# Patient Record
Sex: MALE | Race: WHITE | NOT HISPANIC OR LATINO | Employment: OTHER | URBAN - METROPOLITAN AREA
[De-identification: names, ages, dates, MRNs, and addresses within clinical notes are randomized per-mention and may not be internally consistent; named-entity substitution may affect disease eponyms.]

---

## 2023-05-01 LAB
URINE CULTURE: ABNORMAL
URINE CULTURE: ABNORMAL

## 2023-06-15 LAB
ANION GAP IN SER/PLAS: 10 MMOL/L (ref 10–20)
CALCIUM (MG/DL) IN SER/PLAS: 9.2 MG/DL (ref 8.6–10.3)
CARBON DIOXIDE, TOTAL (MMOL/L) IN SER/PLAS: 31 MMOL/L (ref 21–32)
CHLORIDE (MMOL/L) IN SER/PLAS: 100 MMOL/L (ref 98–107)
CREATININE (MG/DL) IN SER/PLAS: 0.91 MG/DL (ref 0.5–1.3)
ERYTHROCYTE DISTRIBUTION WIDTH (RATIO) BY AUTOMATED COUNT: 14.7 % (ref 11.5–14.5)
ERYTHROCYTE MEAN CORPUSCULAR HEMOGLOBIN CONCENTRATION (G/DL) BY AUTOMATED: 32.6 G/DL (ref 32–36)
ERYTHROCYTE MEAN CORPUSCULAR VOLUME (FL) BY AUTOMATED COUNT: 88 FL (ref 80–100)
ERYTHROCYTES (10*6/UL) IN BLOOD BY AUTOMATED COUNT: 4.86 X10E12/L (ref 4.5–5.9)
GFR MALE: 83 ML/MIN/1.73M2
GLUCOSE (MG/DL) IN SER/PLAS: 94 MG/DL (ref 74–99)
HEMATOCRIT (%) IN BLOOD BY AUTOMATED COUNT: 42.9 % (ref 41–52)
HEMOGLOBIN (G/DL) IN BLOOD: 14 G/DL (ref 13.5–17.5)
INR IN PPP BY COAGULATION ASSAY: 1 (ref 0.9–1.1)
LEUKOCYTES (10*3/UL) IN BLOOD BY AUTOMATED COUNT: 8.9 X10E9/L (ref 4.4–11.3)
PLATELETS (10*3/UL) IN BLOOD AUTOMATED COUNT: 209 X10E9/L (ref 150–450)
POTASSIUM (MMOL/L) IN SER/PLAS: 4.6 MMOL/L (ref 3.5–5.3)
PROTHROMBIN TIME (PT) IN PPP BY COAGULATION ASSAY: 12.1 SEC (ref 9.8–13.4)
SODIUM (MMOL/L) IN SER/PLAS: 136 MMOL/L (ref 136–145)
UREA NITROGEN (MG/DL) IN SER/PLAS: 17 MG/DL (ref 6–23)

## 2023-06-22 ENCOUNTER — HOSPITAL ENCOUNTER (OUTPATIENT)
Dept: DATA CONVERSION | Facility: HOSPITAL | Age: 83
End: 2023-06-23
Attending: INTERNAL MEDICINE | Admitting: INTERNAL MEDICINE

## 2023-06-22 DIAGNOSIS — I48.0 PAROXYSMAL ATRIAL FIBRILLATION (MULTI): ICD-10-CM

## 2023-06-22 DIAGNOSIS — I11.0 HYPERTENSIVE HEART DISEASE WITH HEART FAILURE (MULTI): ICD-10-CM

## 2023-06-22 DIAGNOSIS — I42.9 CARDIOMYOPATHY, UNSPECIFIED (MULTI): ICD-10-CM

## 2023-06-22 DIAGNOSIS — Z95.2 PRESENCE OF PROSTHETIC HEART VALVE: ICD-10-CM

## 2023-06-22 DIAGNOSIS — I25.2 OLD MYOCARDIAL INFARCTION: ICD-10-CM

## 2023-06-22 DIAGNOSIS — I27.20 PULMONARY HYPERTENSION, UNSPECIFIED (MULTI): ICD-10-CM

## 2023-06-22 DIAGNOSIS — I50.22 CHRONIC SYSTOLIC (CONGESTIVE) HEART FAILURE (MULTI): ICD-10-CM

## 2023-06-22 DIAGNOSIS — Z79.01 LONG TERM (CURRENT) USE OF ANTICOAGULANTS: ICD-10-CM

## 2023-06-22 DIAGNOSIS — I25.10 ATHEROSCLEROTIC HEART DISEASE OF NATIVE CORONARY ARTERY WITHOUT ANGINA PECTORIS: ICD-10-CM

## 2023-06-22 DIAGNOSIS — I45.2 BIFASCICULAR BLOCK: ICD-10-CM

## 2023-06-22 DIAGNOSIS — Z79.84 LONG TERM (CURRENT) USE OF ORAL HYPOGLYCEMIC DRUGS: ICD-10-CM

## 2023-06-22 DIAGNOSIS — I42.0 DILATED CARDIOMYOPATHY (MULTI): ICD-10-CM

## 2023-06-22 DIAGNOSIS — I43 CARDIOMYOPATHY IN DISEASES CLASSIFIED ELSEWHERE (MULTI): ICD-10-CM

## 2023-06-26 LAB
ATRIAL RATE: 76 BPM
P AXIS: 16 DEGREES
P OFFSET: 161 MS
P ONSET: 92 MS
PR INTERVAL: 186 MS
Q ONSET: 185 MS
QRS COUNT: 12 BEATS
QRS DURATION: 202 MS
QT INTERVAL: 496 MS
QTC CALCULATION(BAZETT): 558 MS
QTC FREDERICIA: 536 MS
R AXIS: -82 DEGREES
T AXIS: 40 DEGREES
T OFFSET: 433 MS
VENTRICULAR RATE: 76 BPM

## 2023-09-27 LAB
ALANINE AMINOTRANSFERASE (SGPT) (U/L) IN SER/PLAS: 18 U/L (ref 10–52)
ALBUMIN (G/DL) IN SER/PLAS: 4.2 G/DL (ref 3.4–5)
ALKALINE PHOSPHATASE (U/L) IN SER/PLAS: 56 U/L (ref 33–136)
ANION GAP IN SER/PLAS: 15 MMOL/L (ref 10–20)
ASPARTATE AMINOTRANSFERASE (SGOT) (U/L) IN SER/PLAS: 19 U/L (ref 9–39)
BILIRUBIN TOTAL (MG/DL) IN SER/PLAS: 0.4 MG/DL (ref 0–1.2)
CALCIUM (MG/DL) IN SER/PLAS: 9.4 MG/DL (ref 8.6–10.6)
CARBON DIOXIDE, TOTAL (MMOL/L) IN SER/PLAS: 32 MMOL/L (ref 21–32)
CHLORIDE (MMOL/L) IN SER/PLAS: 101 MMOL/L (ref 98–107)
COBALAMIN (VITAMIN B12) (PG/ML) IN SER/PLAS: 565 PG/ML (ref 211–911)
CREATININE (MG/DL) IN SER/PLAS: 0.96 MG/DL (ref 0.5–1.3)
ESTIMATED AVERAGE GLUCOSE FOR HBA1C: 126 MG/DL
GFR MALE: 78 ML/MIN/1.73M2
GLUCOSE (MG/DL) IN SER/PLAS: 95 MG/DL (ref 74–99)
HEMOGLOBIN A1C/HEMOGLOBIN TOTAL IN BLOOD: 6 %
POTASSIUM (MMOL/L) IN SER/PLAS: 4.8 MMOL/L (ref 3.5–5.3)
PROTEIN TOTAL: 7.4 G/DL (ref 6.4–8.2)
SODIUM (MMOL/L) IN SER/PLAS: 143 MMOL/L (ref 136–145)
UREA NITROGEN (MG/DL) IN SER/PLAS: 18 MG/DL (ref 6–23)

## 2023-09-29 PROBLEM — R26.81 GAIT INSTABILITY: Status: ACTIVE | Noted: 2023-09-29

## 2023-09-29 PROBLEM — R39.9 UTI SYMPTOMS: Status: ACTIVE | Noted: 2023-09-29

## 2023-09-29 PROBLEM — I48.91 A-FIB (MULTI): Status: ACTIVE | Noted: 2023-09-29

## 2023-09-29 PROBLEM — N40.1 BENIGN PROSTATIC HYPERPLASIA WITH LOWER URINARY TRACT SYMPTOMS: Status: ACTIVE | Noted: 2023-09-29

## 2023-09-29 PROBLEM — J34.2 DEVIATED NASAL SEPTUM: Status: ACTIVE | Noted: 2023-09-29

## 2023-09-29 PROBLEM — I25.2 H/O ACUTE MYOCARDIAL INFARCTION: Status: ACTIVE | Noted: 2023-09-29

## 2023-09-29 PROBLEM — K21.9 GERD (GASTROESOPHAGEAL REFLUX DISEASE): Status: ACTIVE | Noted: 2023-09-29

## 2023-09-29 PROBLEM — Z85.46 H/O PROSTATE CANCER: Status: ACTIVE | Noted: 2023-09-29

## 2023-09-29 PROBLEM — I25.10 CAD IN NATIVE ARTERY: Status: ACTIVE | Noted: 2023-09-29

## 2023-09-29 PROBLEM — D64.9 ANEMIA: Status: ACTIVE | Noted: 2023-09-29

## 2023-09-29 PROBLEM — Z96.1 PSEUDOPHAKIA: Status: ACTIVE | Noted: 2023-09-29

## 2023-09-29 PROBLEM — Z95.2 S/P TAVR (TRANSCATHETER AORTIC VALVE REPLACEMENT): Status: ACTIVE | Noted: 2023-09-29

## 2023-09-29 PROBLEM — R09.02 HYPOXIA: Status: ACTIVE | Noted: 2023-09-29

## 2023-09-29 PROBLEM — H52.4 BILATERAL PRESBYOPIA: Status: ACTIVE | Noted: 2023-09-29

## 2023-09-29 PROBLEM — I50.1 HEART FAILURE, LEFT, WITH LVEF <=30% (MULTI): Status: ACTIVE | Noted: 2023-09-29

## 2023-09-29 PROBLEM — H35.3132 INTERMEDIATE STAGE NONEXUDATIVE AGE-RELATED MACULAR DEGENERATION OF BOTH EYES: Status: ACTIVE | Noted: 2023-09-29

## 2023-09-29 PROBLEM — Z95.5 S/P CORONARY ARTERY STENT PLACEMENT: Status: ACTIVE | Noted: 2023-09-29

## 2023-09-29 PROBLEM — R53.1 WEAKNESS: Status: ACTIVE | Noted: 2023-09-29

## 2023-09-29 PROBLEM — J34.89 RHINORRHEA: Status: ACTIVE | Noted: 2023-09-29

## 2023-09-29 PROBLEM — I45.2 RBBB (RIGHT BUNDLE BRANCH BLOCK WITH LEFT ANTERIOR FASCICULAR BLOCK): Status: ACTIVE | Noted: 2023-09-29

## 2023-09-29 PROBLEM — R09.89 THROAT CLEARING: Status: ACTIVE | Noted: 2023-09-29

## 2023-09-29 PROBLEM — K59.00 CONSTIPATION: Status: ACTIVE | Noted: 2023-09-29

## 2023-09-29 PROBLEM — I27.20 PULMONARY HYPERTENSION (MULTI): Status: ACTIVE | Noted: 2023-09-29

## 2023-09-29 PROBLEM — R32 URINARY INCONTINENCE: Status: ACTIVE | Noted: 2023-09-29

## 2023-09-29 PROBLEM — E46 MALNUTRITION (MULTI): Status: ACTIVE | Noted: 2023-09-29

## 2023-09-29 PROBLEM — I27.23: Status: ACTIVE | Noted: 2023-09-29

## 2023-09-29 PROBLEM — F41.9 ANXIETY: Status: ACTIVE | Noted: 2023-09-29

## 2023-09-29 PROBLEM — N39.0 URINARY TRACT INFECTION: Status: ACTIVE | Noted: 2023-09-29

## 2023-09-29 PROBLEM — R26.89 BALANCE DISORDER: Status: ACTIVE | Noted: 2023-09-29

## 2023-09-29 PROBLEM — I42.9 CARDIOMYOPATHY (MULTI): Status: ACTIVE | Noted: 2023-09-29

## 2023-09-29 RX ORDER — SILDENAFIL 100 MG/1
100 TABLET, FILM COATED ORAL AS NEEDED
COMMUNITY
Start: 2020-12-09

## 2023-09-29 RX ORDER — AZELASTINE 1 MG/ML
2 SPRAY, METERED NASAL 2 TIMES DAILY
COMMUNITY
Start: 2023-06-06 | End: 2023-12-01

## 2023-09-29 RX ORDER — NITROGLYCERIN 0.4 MG/1
0.4 TABLET SUBLINGUAL EVERY 5 MIN PRN
COMMUNITY
Start: 2021-10-07

## 2023-09-29 RX ORDER — MIRABEGRON 50 MG/1
50 TABLET, FILM COATED, EXTENDED RELEASE ORAL DAILY
COMMUNITY
End: 2023-10-04 | Stop reason: SDUPTHER

## 2023-09-29 RX ORDER — ATORVASTATIN CALCIUM 40 MG/1
40 TABLET, FILM COATED ORAL DAILY
COMMUNITY
End: 2024-04-29

## 2023-09-29 RX ORDER — TRIAMCINOLONE ACETONIDE 1 MG/G
1 CREAM TOPICAL
COMMUNITY
Start: 2023-08-04

## 2023-09-29 RX ORDER — TACROLIMUS 1 MG/G
1 OINTMENT TOPICAL 2 TIMES DAILY
COMMUNITY
Start: 2023-09-27 | End: 2024-09-27

## 2023-09-29 RX ORDER — APIXABAN 5 MG/1
1 TABLET, FILM COATED ORAL EVERY 12 HOURS
COMMUNITY
Start: 2022-01-14 | End: 2024-04-29

## 2023-09-29 RX ORDER — CARVEDILOL 3.12 MG/1
3.12 TABLET ORAL
COMMUNITY
End: 2023-12-20 | Stop reason: SDUPTHER

## 2023-09-29 RX ORDER — IPRATROPIUM BROMIDE 42 UG/1
2 SPRAY, METERED NASAL 4 TIMES DAILY
COMMUNITY
Start: 2023-06-15 | End: 2023-11-06

## 2023-09-29 RX ORDER — NICOTINE POLACRILEX 2 MG
1 GUM BUCCAL DAILY
COMMUNITY
Start: 2022-02-03 | End: 2024-01-25 | Stop reason: WASHOUT

## 2023-09-29 RX ORDER — FINASTERIDE 5 MG/1
1 TABLET, FILM COATED ORAL DAILY
COMMUNITY
Start: 2021-11-18 | End: 2024-01-25 | Stop reason: WASHOUT

## 2023-09-29 RX ORDER — SPIRONOLACTONE 25 MG/1
25 TABLET ORAL DAILY
COMMUNITY
End: 2024-05-06

## 2023-09-29 RX ORDER — MULTIVITAMIN
1 TABLET ORAL DAILY
COMMUNITY
Start: 2022-02-03

## 2023-09-29 RX ORDER — CLOPIDOGREL BISULFATE 75 MG/1
75 TABLET ORAL DAILY
COMMUNITY
Start: 2021-11-18 | End: 2024-01-22

## 2023-09-29 RX ORDER — ONDANSETRON 4 MG/1
4 TABLET, FILM COATED ORAL DAILY PRN
COMMUNITY
Start: 2023-03-01 | End: 2023-10-31

## 2023-09-29 RX ORDER — DOCUSATE SODIUM 100 MG/1
100 CAPSULE, LIQUID FILLED ORAL 2 TIMES DAILY
COMMUNITY
Start: 2021-10-11

## 2023-09-29 RX ORDER — HYDROXYZINE HYDROCHLORIDE 10 MG/1
10 TABLET, FILM COATED ORAL 3 TIMES DAILY PRN
COMMUNITY
End: 2024-01-25 | Stop reason: WASHOUT

## 2023-09-29 RX ORDER — PANTOPRAZOLE SODIUM 40 MG/1
1 TABLET, DELAYED RELEASE ORAL DAILY
COMMUNITY
Start: 2021-12-20 | End: 2024-01-25 | Stop reason: WASHOUT

## 2023-09-29 RX ORDER — AMIODARONE HYDROCHLORIDE 200 MG/1
200 TABLET ORAL DAILY
COMMUNITY
End: 2023-12-01

## 2023-09-29 RX ORDER — ACETAMINOPHEN 500 MG
1 TABLET ORAL EVERY 6 HOURS PRN
COMMUNITY
Start: 2022-02-11 | End: 2024-01-25 | Stop reason: WASHOUT

## 2023-09-29 RX ORDER — TRAZODONE HYDROCHLORIDE 50 MG/1
50 TABLET ORAL DAILY PRN
COMMUNITY
End: 2024-01-25 | Stop reason: WASHOUT

## 2023-09-29 RX ORDER — SACUBITRIL AND VALSARTAN 49; 51 MG/1; MG/1
TABLET, FILM COATED ORAL
COMMUNITY
Start: 2023-06-21 | End: 2024-04-01

## 2023-09-29 RX ORDER — TAMSULOSIN HYDROCHLORIDE 0.4 MG/1
0.4 CAPSULE ORAL DAILY
COMMUNITY
End: 2024-01-25 | Stop reason: WASHOUT

## 2023-09-29 RX ORDER — FUROSEMIDE 20 MG/1
1 TABLET ORAL DAILY PRN
COMMUNITY
Start: 2021-12-20

## 2023-09-29 RX ORDER — TRAZODONE HYDROCHLORIDE 100 MG/1
100 TABLET ORAL NIGHTLY
COMMUNITY
End: 2024-02-26 | Stop reason: SDUPTHER

## 2023-09-30 NOTE — PROGRESS NOTES
Consult Type: subsequent visit/care     Service: Electrophysiology     Subjective Data:   SEBASTIAN BINGHAM is a 82 year old Male who is Hospital Day # 2 and is s/p CRT D with  23.     No chest pain / shortness pf breath / incision site pain or tenderness.    Overnight Events: Patient had an uneventful night.     Objective Data:     Objective Information:      T   P  R  BP   MAP  SpO2   Value  36.6  71  18  132/70   91  98%  Date/Time  7: 7: 7:24  7:24   7: 7:24  Range  (36.5C - 36.8C )  (68 - 80 )  (16 - 18 )  (94 - 132 )/ (54 - 74 )  (67 - 93 )  (92% - 98% )      Pain reported at  20:30: 1 = Mild      T   P  R  BP   MAP  SpO2   Value  36.6  71  18  132/70   91  98%  Date/Time  7:24  7:24  7:24  7:24   7:24  7:24  Range  (36.5C - 36.8C )  (68 - 80 )  (16 - 18 )  (94 - 132 )/ (54 - 74 )  (67 - 93 )  (92% - 98% )    Physical Exam Narrative:  ·  Physical Exam:    General: NAD  Neck: No JVP  Cardiac: regular rate and rhythm  Lungs: Normal work of breathing   Exterminates: No edema, pulses intact   Skin: Incision site dry and no swelling noted, mild discomfort to palpitations   Neuro: Awake and alert. No focal abnormalities noted      Medication:    Medications:          Continuous Medications       --------------------------------  No continuous medications are active       Scheduled Medications       --------------------------------    1. Amiodarone:  200  mg  Oral  Every 24 Hours    2. Atorvastatin:  40  mg  Oral  At Bedtime    3. Carvedilol:  3.125  mg  Oral  2 Times a Day    4. Cephalexin:  500  mg  Oral  Every 12 Hours    5. Empagliflozin:  10  mg  Oral  Daily    6. Finasteride:  5  mg  Oral  Daily    7. Sacubitril 49 mg - Valsartan 51 m  tablet(s)  Oral  2 Times a Day    8. Spironolactone:  25  mg  Oral  Daily    9. traZODone:  100  mg  Oral  At Bedtime         PRN Medications       --------------------------------    1.  Acetaminophen:  650  mg  Oral  Every 6 Hours    2. Sodium Chloride 0.9% Injectable Flush:  10  mL  IntraVenous Flush  Every 8 Hours and as Needed        Radiology Results:    Results:        Impression:    No pneumothorax following placement left subclavian pacer/ICD device.                 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     ECG with SR + c RBBB + LAFB ( QRSd 166 ms )    He underwent successful CRT D with  6/222/23    - Continue home medications  - Antibiotics x 7 days   - The device site was examined this morning and was clean, dry, and intact with Steristrips/Dermabond present. There was no bleeding, bruising, edema, or erythema present.  - Device interrogation showed appropriate sensing, lead impedances, and thresholds.   - The chest x-ray and ECG were reviewed. Telemetry was reviewed.  -The patient will follow up with device clinic.  -The patient will return home today, and all questions were answered.      Attestation:   Note Completion:  I am a:  Resident/Fellow   Attending Attestation I saw and evaluated the patient.  I personally obtained the key and critical portions of the history and physical exam or was physically present for key and  critical portions performed by the resident/fellow. I reviewed the resident/fellow?s documentation and discussed the patient with the resident/fellow.  I agree with the resident/fellow?s medical decision making as documented in the note.     I personally evaluated the  patient on 23-Jun-2023         Electronic Signatures:  Anthony Barbour (Fellow))  (Signed 23-Jun-2023 08:26)   Authored: Service, Subjective Data, Objective Data, Assessment  and Plan, Note Completion  Ruddy Pak)  (Signed 26-Jun-2023 09:45)   Authored: Note Completion   Co-Signer: Service, Subjective Data, Objective Data, Assessment and Plan, Note Completion      Last Updated: 26-Jun-2023 09:45 by Ruddy Pak)

## 2023-09-30 NOTE — H&P
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     ECG with SR + c RBBB + LAFB ( QRSd 166 ms )    Allergies:        Allergies:  ·  No Known Allergies :     Home Medication Review:   Home Medications Reviewed: yes     Impression/Procedure:   ·  Impression and Planned Procedure: Severe cardiomyopathy with QRSd > 150 ms on GDMT presenting for CRT D       ERAS (Enhanced Recovery After Surgery):  ·  ERAS Patient: no       Physical Exam by System:    Constitutional: Well developed, awake/alert/oriented  x3, no distress, alert and cooperative   Respiratory/Thorax: Patent airways, normal breath  sounds with good chest expansion, thorax symmetric   Cardiovascular: Regular, 2+ equal pulses of the extremities,   Gastrointestinal: Nondistended, soft, non-tender   Neurological: alert and oriented x3   Skin: Warm and dry, no lesions, no rashes     Consent:   COVID-19 Consent:  ·  COVID-19 Risk Consent Surgeon has reviewed key risks related to the risk of allie COVID-19 and if they contract COVID-19 what the risks are.     Attestation:   Note Completion:  I am a:  Resident/Fellow   Attending Attestation I saw and evaluated the patient.  I personally obtained the key and critical portions of the history and physical exam or was physically present for key and  critical portions performed by the resident/fellow. I reviewed the resident/fellow?s documentation and discussed the patient with the resident/fellow.  I agree with the resident/fellow?s medical decision making as documented in the note.     I personally evaluated the patient on 22-Jun-2023          Electronic Signatures:  Anthony Barbour (Fellow))  (Signed 22-Jun-2023 09:57)   Authored: History of Present Illness, Allergies, Home  Medication Review, Impression/Procedure, ERAS, Physical Exam, Consent, Note Completion  Ruddy Pak)  (Signed 26-Jun-2023 09:35)   Authored: Note Completion   Co-Signer: History of Present Illness, Allergies, Home Medication Review, Impression/Procedure, ERAS, Physical Exam, Consent, Note Completion      Last Updated: 26-Jun-2023 09:35 by Ruddy Pak)

## 2023-10-04 ENCOUNTER — OFFICE VISIT (OUTPATIENT)
Dept: UROLOGY | Facility: CLINIC | Age: 83
End: 2023-10-04
Payer: COMMERCIAL

## 2023-10-04 VITALS — HEIGHT: 73 IN | TEMPERATURE: 97.3 F | BODY MASS INDEX: 23.72 KG/M2 | WEIGHT: 179 LBS

## 2023-10-04 DIAGNOSIS — N40.1 BENIGN PROSTATIC HYPERPLASIA WITH LOWER URINARY TRACT SYMPTOMS, SYMPTOM DETAILS UNSPECIFIED: Primary | ICD-10-CM

## 2023-10-04 DIAGNOSIS — R26.81 GAIT INSTABILITY: ICD-10-CM

## 2023-10-04 DIAGNOSIS — R53.1 WEAKNESS: ICD-10-CM

## 2023-10-04 DIAGNOSIS — R26.89 BALANCE DISORDER: ICD-10-CM

## 2023-10-04 DIAGNOSIS — I50.1 HEART FAILURE, LEFT, WITH LVEF <=30% (MULTI): ICD-10-CM

## 2023-10-04 PROCEDURE — 51798 US URINE CAPACITY MEASURE: CPT | Performed by: UROLOGY

## 2023-10-04 PROCEDURE — 99214 OFFICE O/P EST MOD 30 MIN: CPT | Performed by: UROLOGY

## 2023-10-04 PROCEDURE — 1126F AMNT PAIN NOTED NONE PRSNT: CPT | Performed by: UROLOGY

## 2023-10-04 PROCEDURE — 1159F MED LIST DOCD IN RCRD: CPT | Performed by: UROLOGY

## 2023-10-04 RX ORDER — MIRABEGRON 50 MG/1
50 TABLET, FILM COATED, EXTENDED RELEASE ORAL DAILY
Qty: 90 EACH | Refills: 3 | Status: SHIPPED | OUTPATIENT
Start: 2023-10-04 | End: 2023-11-02 | Stop reason: SDUPTHER

## 2023-10-04 NOTE — PROGRESS NOTES
Subjective   Patient ID: Timothy Comer is a 82 y.o. male who presents for Benign Prostatic Hypertrophy.  HPI  The patient is a 82 year old male with a history of BPH with worsening LUTS, mostly obstructive, on Myrbetriq 50mg. He presents today for a follow up visit.  Patient reports Myrbetriq controls urinary frequency and urgency well, however it works about 80% of the time. He is interested in other treatment options.     PVR>400 but did not void    Patient Active Problem List   Diagnosis    A-fib (CMS/HCC)    Anemia    Anxiety    Balance disorder    Benign prostatic hyperplasia with lower urinary tract symptoms    Bilateral presbyopia    CAD in native artery    Cardiomyopathy (CMS/HCC)    Constipation    Deviated nasal septum    Gait instability    GERD (gastroesophageal reflux disease)    H/O acute myocardial infarction    H/O prostate cancer    Heart failure, left, with LVEF <=30% (CMS/HCC)    Hypoxia    Intermediate stage nonexudative age-related macular degeneration of both eyes    Malnutrition (CMS/HCC)    Pseudophakia    Pulmonary hypertension (CMS/HCC)    Pulmonary hypertension due to lung diseases and hypoxia (CMS/HCC)    RBBB (right bundle branch block with left anterior fascicular block)    Rhinorrhea    S/P coronary artery stent placement    S/P TAVR (transcatheter aortic valve replacement)    Throat clearing    Urinary incontinence    Urinary tract infection    UTI symptoms    Weakness       Past Surgical History:   Procedure Laterality Date    CT HEAD ANGIO W AND WO IV CONTRAST  10/20/2021    CT HEAD ANGIO W AND WO IV CONTRAST 10/20/2021 [facility] CLINICAL LEGACY       Assessment/Plan     BPH with LUTs     I discussed with him that bladder Botox injections is an option, however, he would need to have cystoscopy prior.  Risks of intravesical Botox injection were discussed with the patient in great detail. Adverse reactions reported have been UTI, dysuria, hematuria, elevated PVR and urinary retention in up  to 15% of patients. I explained that urinary retention is not permanent and usually resolves within 6 weeks.     We will obtain a prostate MRI to evaluate prostate anatomy and size.     We will refill Myrbetriq and follow up virtually to review MRI.    All questions were answered to the patient's satisfaction. Patient agrees with the plan and wishes to proceed. Follow-up will be scheduled appropriately.     Scribed for Dr. Cain by Nenita Carrillo. I , Dr Cain, have personally reviewed and agreed with the information entered by the Virtual Scribe.

## 2023-10-10 DIAGNOSIS — I42.9 CARDIOMYOPATHY, UNSPECIFIED TYPE (MULTI): Primary | ICD-10-CM

## 2023-10-10 DIAGNOSIS — Z95.810 PRESENCE OF AUTOMATIC CARDIOVERTER/DEFIBRILLATOR (AICD): ICD-10-CM

## 2023-10-19 ENCOUNTER — HOSPITAL ENCOUNTER (OUTPATIENT)
Dept: RADIOLOGY | Facility: HOSPITAL | Age: 83
Discharge: HOME | End: 2023-10-19
Payer: COMMERCIAL

## 2023-10-19 DIAGNOSIS — R26.9 UNSPECIFIED ABNORMALITIES OF GAIT AND MOBILITY: ICD-10-CM

## 2023-10-19 PROCEDURE — 70450 CT HEAD/BRAIN W/O DYE: CPT | Performed by: RADIOLOGY

## 2023-10-19 PROCEDURE — 70450 CT HEAD/BRAIN W/O DYE: CPT

## 2023-10-25 DIAGNOSIS — R26.81 GAIT INSTABILITY: Primary | ICD-10-CM

## 2023-11-02 DIAGNOSIS — R39.9 UTI SYMPTOMS: Primary | ICD-10-CM

## 2023-11-02 DIAGNOSIS — N40.1 BENIGN PROSTATIC HYPERPLASIA WITH LOWER URINARY TRACT SYMPTOMS, SYMPTOM DETAILS UNSPECIFIED: ICD-10-CM

## 2023-11-02 RX ORDER — MIRABEGRON 50 MG/1
50 TABLET, FILM COATED, EXTENDED RELEASE ORAL DAILY
Qty: 90 TABLET | Refills: 3 | Status: SHIPPED | OUTPATIENT
Start: 2023-11-02 | End: 2024-11-01

## 2023-11-06 ENCOUNTER — LAB (OUTPATIENT)
Dept: LAB | Facility: LAB | Age: 83
End: 2023-11-06
Payer: COMMERCIAL

## 2023-11-06 DIAGNOSIS — R39.9 UTI SYMPTOMS: ICD-10-CM

## 2023-11-06 PROCEDURE — 87086 URINE CULTURE/COLONY COUNT: CPT

## 2023-11-06 PROCEDURE — 87186 SC STD MICRODIL/AGAR DIL: CPT

## 2023-11-06 RX ORDER — SULFAMETHOXAZOLE AND TRIMETHOPRIM 800; 160 MG/1; MG/1
1 TABLET ORAL 2 TIMES DAILY
Qty: 14 TABLET | Refills: 0 | Status: SHIPPED | OUTPATIENT
Start: 2023-11-06 | End: 2023-11-13

## 2023-11-07 ENCOUNTER — APPOINTMENT (OUTPATIENT)
Dept: UROLOGY | Facility: CLINIC | Age: 83
End: 2023-11-07
Payer: COMMERCIAL

## 2023-11-08 ENCOUNTER — TELEMEDICINE (OUTPATIENT)
Dept: UROLOGY | Facility: CLINIC | Age: 83
End: 2023-11-08
Payer: COMMERCIAL

## 2023-11-08 DIAGNOSIS — N40.1 BENIGN PROSTATIC HYPERPLASIA WITH URINARY OBSTRUCTION: Primary | ICD-10-CM

## 2023-11-08 DIAGNOSIS — N13.8 BENIGN PROSTATIC HYPERPLASIA WITH URINARY OBSTRUCTION: Primary | ICD-10-CM

## 2023-11-08 PROCEDURE — 99213 OFFICE O/P EST LOW 20 MIN: CPT | Performed by: UROLOGY

## 2023-11-08 NOTE — PROGRESS NOTES
Subjective     This visit was completed via telemedicine. All issues as below were discussed and addressed but no physical exam was performed unless allowed by visual confirmation. If it was felt that the patient should be evaluated in clinic, then they were directed there. Patient verbally consented to visit.    Greg Comer is a 83 y.o. male with a history of BPH with worsening LUTS, mostly obstructive, on Myrbetriq 50mg. He presents today for a follow up visit to review prostate MRI.  During his last visit patient reported Myrbetriq controls urinary frequency and urgency well, however it works about 80% of the time. He is interested in other treatment options.  Patients urine culture from 11/06/23  was positive for >100,000 e.coli, he was started on antibiotics and is feeling better. Patients prostate MRI was postponed due to metal implants.     Past Medical History:   Diagnosis Date    Personal history of other diseases of the circulatory system 02/16/2022    History of aortic valve stenosis     Past Surgical History:   Procedure Laterality Date    CT HEAD ANGIO W AND WO IV CONTRAST  10/20/2021    CT HEAD ANGIO W AND WO IV CONTRAST 10/20/2021 [facility] CLINICAL LEGACY     No family history on file.  Current Outpatient Medications   Medication Sig Dispense Refill    acetaminophen (Tylenol) 500 mg tablet Take 1 tablet (500 mg) by mouth every 6 hours if needed.      amiodarone (Pacerone) 200 mg tablet Take 1 tablet (200 mg) by mouth once daily.      atorvastatin (Lipitor) 40 mg tablet Take 1 tablet (40 mg) by mouth once daily.      azelastine (Astelin) 137 mcg (0.1 %) nasal spray Administer 2 sprays into each nostril 2 times a day.      biotin 1 mg capsule Take 1 capsule (1 mg) by mouth once daily.      carvedilol (Coreg) 3.125 mg tablet Take 1 tablet (3.125 mg) by mouth 2 times a day with meals.      clopidogrel (Plavix) 75 mg tablet Take 1 tablet (75 mg) by mouth once daily.      docusate sodium (Colace) 100 mg  capsule Take 1 capsule (100 mg) by mouth twice a day.      Eliquis 5 mg tablet Take 1 tablet (5 mg) by mouth every 12 hours.      Entresto 49-51 mg tablet       finasteride (Proscar) 5 mg tablet Take 1 tablet (5 mg) by mouth once daily.      fish oil concentrate (Omega-3) 120-180 mg capsule Take 1 capsule (1 g) by mouth once daily.      furosemide (Lasix) 20 mg tablet Take 1 tablet (20 mg) by mouth once daily as needed.      hydrOXYzine HCL (Atarax) 10 mg tablet Take 1 tablet (10 mg) by mouth 3 times a day as needed.      ipratropium (Atrovent) 21 mcg (0.03 %) nasal spray SPRAY 2 SPRAYS INTO EACH NOSTRIL 3 TIMES A DAY AS NEEDED 30 mL 0    ipratropium (Atrovent) 42 mcg (0.06 %) nasal spray USE 2 SPRAYS IN EACH NOSTRIL 3-4 TIMES DAILY. 15 mL 11    Jardiance 10 mg TAKE 1 TABLET BY MOUTH EVERY DAY 90 tablet 3    multivitamin tablet Take 1 tablet by mouth once daily.      Myrbetriq 50 mg tablet extended release 24 hr 24 hr tablet Take 1 tablet (50 mg) by mouth once daily. 90 tablet 3    nitroglycerin (Nitrostat) 0.4 mg SL tablet Place 1 tablet (0.4 mg) under the tongue every 5 minutes if needed. UP TO 3 DOSES AS NEEDED FOR CHEST PAIN.      ondansetron (Zofran) 4 mg tablet TAKE 1 TABLET BY MOUTH EVERY DAY AS NEEDED 15 tablet 0    pantoprazole (ProtoNix) 40 mg EC tablet Take 1 tablet (40 mg) by mouth once daily.      sildenafil (Viagra) 100 mg tablet Take 1 tablet (100 mg) by mouth if needed.      spironolactone (Aldactone) 25 mg tablet Take 1 tablet (25 mg) by mouth once daily.      sulfamethoxazole-trimethoprim (Bactrim DS) 800-160 mg tablet Take 1 tablet by mouth 2 times a day for 7 days. 14 tablet 0    tacrolimus (Protopic) 0.1 % ointment Apply 1 Application topically 2 times a day.      tamsulosin (Flomax) 0.4 mg 24 hr capsule Take 1 capsule (0.4 mg) by mouth once daily.      traZODone (Desyrel) 100 mg tablet Take 1 tablet (100 mg) by mouth once daily at bedtime.      traZODone (Desyrel) 50 mg tablet Take 1 tablet (50  mg) by mouth once daily as needed.      triamcinolone (Kenalog) 0.1 % cream Apply 1 Application topically.       No current facility-administered medications for this visit.     No Known Allergies  Social History     Socioeconomic History    Marital status:      Spouse name: Not on file    Number of children: Not on file    Years of education: Not on file    Highest education level: Not on file   Occupational History    Not on file   Tobacco Use    Smoking status: Not on file    Smokeless tobacco: Not on file   Substance and Sexual Activity    Alcohol use: Not on file    Drug use: Not on file    Sexual activity: Not on file   Other Topics Concern    Not on file   Social History Narrative    Not on file     Social Determinants of Health     Financial Resource Strain: Not on file   Food Insecurity: Not on file   Transportation Needs: Not on file   Physical Activity: Not on file   Stress: Not on file   Social Connections: Not on file   Intimate Partner Violence: Not on file   Housing Stability: Not on file       Review of Systems  Pertinent items are noted in HPI.    Objective     Lab Review  Lab Results   Component Value Date    WBC 8.9 06/15/2023    RBC 4.86 06/15/2023    HGB 14.0 06/15/2023    HCT 42.9 06/15/2023     06/15/2023      Lab Results   Component Value Date    BUN 18 09/27/2023    CREATININE 0.96 09/27/2023        Assessment/Plan   There are no diagnoses linked to this encounter.  BPH with LUTs     Prostate MRI Was postponed due to patients metal implants, this will be scheduled as soon as possible.     We will follow up virtually to review once completed.     2. UTI      Patients urine culture from 11/06/23  was positive for >100,000 e.coli, he was started on antibiotics and is feeling better.    All questions were answered to the patient's satisfaction. Patient agrees with the plan and wishes to proceed. Follow-up will be scheduled appropriately. ;    I spent  20 minutes of dedicated E&M  time, including preparation and review of records, notes, and data, time spent with patient/family, and documentation.       Scribed for Dr. Cain by Nenita Carrillo. I , Dr Cain, have personally reviewed and agreed with the information entered by the Virtual Scribe.        Nenita Carrillo

## 2023-11-09 DIAGNOSIS — N39.0 URINARY TRACT INFECTION WITHOUT HEMATURIA, SITE UNSPECIFIED: Primary | ICD-10-CM

## 2023-11-09 LAB — BACTERIA UR CULT: ABNORMAL

## 2023-11-09 RX ORDER — AMOXICILLIN AND CLAVULANATE POTASSIUM 875; 125 MG/1; MG/1
1 TABLET, FILM COATED ORAL 2 TIMES DAILY
Qty: 14 TABLET | Refills: 0 | Status: SHIPPED | OUTPATIENT
Start: 2023-11-09 | End: 2023-11-16

## 2023-11-13 ENCOUNTER — HOSPITAL ENCOUNTER (OUTPATIENT)
Dept: CARDIOLOGY | Facility: HOSPITAL | Age: 83
Discharge: HOME | End: 2023-11-13
Payer: COMMERCIAL

## 2023-11-13 DIAGNOSIS — I48.91 ATRIAL FIBRILLATION, UNSPECIFIED TYPE (MULTI): ICD-10-CM

## 2023-11-13 DIAGNOSIS — I42.9 CARDIOMYOPATHY, UNSPECIFIED TYPE (MULTI): Primary | ICD-10-CM

## 2023-11-13 DIAGNOSIS — Z95.810 PRESENCE OF AUTOMATIC CARDIOVERTER/DEFIBRILLATOR (AICD): ICD-10-CM

## 2023-11-13 DIAGNOSIS — I42.9 CARDIOMYOPATHY, UNSPECIFIED TYPE (MULTI): ICD-10-CM

## 2023-11-13 PROCEDURE — 93284 PRGRMG EVAL IMPLANTABLE DFB: CPT

## 2023-11-13 PROCEDURE — 93290 INTERROG DEV EVAL ICPMS IP: CPT | Performed by: INTERNAL MEDICINE

## 2023-11-13 PROCEDURE — 93284 PRGRMG EVAL IMPLANTABLE DFB: CPT | Performed by: INTERNAL MEDICINE

## 2023-11-29 ENCOUNTER — TELEMEDICINE (OUTPATIENT)
Dept: UROLOGY | Facility: CLINIC | Age: 83
End: 2023-11-29
Payer: COMMERCIAL

## 2023-11-29 PROCEDURE — 99213 OFFICE O/P EST LOW 20 MIN: CPT | Performed by: UROLOGY

## 2023-11-29 NOTE — PROGRESS NOTES
Subjective     This visit was completed via telemedicine. All issues as below were discussed and addressed but no physical exam was performed unless allowed by visual confirmation. If it was felt that the patient should be evaluated in clinic, then they were directed there. Patient verbally consented to visit.    Greg Comer is a 83 y.o. male with a history of BPH with worsening LUTS, mostly obstructive, on Myrbetriq 50mg. He presents today for a follow up visit. Patient reports Myrbetriq controls urinary frequency and urgency well, however it works about 80% of the time. He is interested in other treatment options.  Patients urine culture from 11/06/23  was positive for >100,000 e.coli, he was started on antibiotics and is feeling better. Patients prostate MRI is pending, it was postponed due to metal implants.       Past Medical History:   Diagnosis Date    Personal history of other diseases of the circulatory system 02/16/2022    History of aortic valve stenosis     Past Surgical History:   Procedure Laterality Date    CT HEAD ANGIO W AND WO IV CONTRAST  10/20/2021    CT HEAD ANGIO W AND WO IV CONTRAST 10/20/2021 [facility] CLINICAL LEGACY     No family history on file.  Current Outpatient Medications   Medication Sig Dispense Refill    acetaminophen (Tylenol) 500 mg tablet Take 1 tablet (500 mg) by mouth every 6 hours if needed.      amiodarone (Pacerone) 200 mg tablet Take 1 tablet (200 mg) by mouth once daily.      atorvastatin (Lipitor) 40 mg tablet Take 1 tablet (40 mg) by mouth once daily.      azelastine (Astelin) 137 mcg (0.1 %) nasal spray Administer 2 sprays into each nostril 2 times a day.      biotin 1 mg capsule Take 1 capsule (1 mg) by mouth once daily.      carvedilol (Coreg) 3.125 mg tablet Take 1 tablet (3.125 mg) by mouth 2 times a day with meals.      clopidogrel (Plavix) 75 mg tablet Take 1 tablet (75 mg) by mouth once daily.      docusate sodium (Colace) 100 mg capsule Take 1 capsule (100 mg) by  mouth twice a day.      Eliquis 5 mg tablet Take 1 tablet (5 mg) by mouth every 12 hours.      Entresto 49-51 mg tablet       finasteride (Proscar) 5 mg tablet Take 1 tablet (5 mg) by mouth once daily.      fish oil concentrate (Omega-3) 120-180 mg capsule Take 1 capsule (1 g) by mouth once daily.      furosemide (Lasix) 20 mg tablet Take 1 tablet (20 mg) by mouth once daily as needed.      hydrOXYzine HCL (Atarax) 10 mg tablet Take 1 tablet (10 mg) by mouth 3 times a day as needed.      ipratropium (Atrovent) 21 mcg (0.03 %) nasal spray SPRAY 2 SPRAYS INTO EACH NOSTRIL 3 TIMES A DAY AS NEEDED 30 mL 0    ipratropium (Atrovent) 42 mcg (0.06 %) nasal spray USE 2 SPRAYS IN EACH NOSTRIL 3-4 TIMES DAILY. 15 mL 11    Jardiance 10 mg TAKE 1 TABLET BY MOUTH EVERY DAY 90 tablet 3    multivitamin tablet Take 1 tablet by mouth once daily.      Myrbetriq 50 mg tablet extended release 24 hr 24 hr tablet Take 1 tablet (50 mg) by mouth once daily. 90 tablet 3    nitroglycerin (Nitrostat) 0.4 mg SL tablet Place 1 tablet (0.4 mg) under the tongue every 5 minutes if needed. UP TO 3 DOSES AS NEEDED FOR CHEST PAIN.      ondansetron (Zofran) 4 mg tablet TAKE 1 TABLET BY MOUTH EVERY DAY AS NEEDED 15 tablet 0    pantoprazole (ProtoNix) 40 mg EC tablet Take 1 tablet (40 mg) by mouth once daily.      sildenafil (Viagra) 100 mg tablet Take 1 tablet (100 mg) by mouth if needed.      spironolactone (Aldactone) 25 mg tablet Take 1 tablet (25 mg) by mouth once daily.      tacrolimus (Protopic) 0.1 % ointment Apply 1 Application topically 2 times a day.      tamsulosin (Flomax) 0.4 mg 24 hr capsule Take 1 capsule (0.4 mg) by mouth once daily.      traZODone (Desyrel) 100 mg tablet Take 1 tablet (100 mg) by mouth once daily at bedtime.      traZODone (Desyrel) 50 mg tablet Take 1 tablet (50 mg) by mouth once daily as needed.      triamcinolone (Kenalog) 0.1 % cream Apply 1 Application topically.       No current facility-administered medications  for this visit.     No Known Allergies  Social History     Socioeconomic History    Marital status:      Spouse name: Not on file    Number of children: Not on file    Years of education: Not on file    Highest education level: Not on file   Occupational History    Not on file   Tobacco Use    Smoking status: Not on file    Smokeless tobacco: Not on file   Substance and Sexual Activity    Alcohol use: Not on file    Drug use: Not on file    Sexual activity: Not on file   Other Topics Concern    Not on file   Social History Narrative    Not on file     Social Determinants of Health     Financial Resource Strain: Not on file   Food Insecurity: Not on file   Transportation Needs: Not on file   Physical Activity: Not on file   Stress: Not on file   Social Connections: Not on file   Intimate Partner Violence: Not on file   Housing Stability: Not on file       Review of Systems  Pertinent items are noted in HPI.    Objective     Lab Review  Lab Results   Component Value Date    WBC 8.9 06/15/2023    RBC 4.86 06/15/2023    HGB 14.0 06/15/2023    HCT 42.9 06/15/2023     06/15/2023      Lab Results   Component Value Date    BUN 18 09/27/2023    CREATININE 0.96 09/27/2023     Assessment/Plan   There are no diagnoses linked to this encounter.  BPH with LUTs      Prostate MRI is pending, it was postponed due to patients metal implants, this will be scheduled as soon as possible.      We will follow up virtually to review once completed.      2. UTI      Patients urine culture from 11/06/23  was positive for >100,000 e.coli, he was started on antibiotics and is feeling better.     All questions were answered to the patient's satisfaction. Patient agrees with the plan and wishes to proceed. Follow-up will be scheduled appropriately. ;     I spent  20 minutes of dedicated E&M time, including preparation and review of records, notes, and data, time spent with patient/family, and documentation.         Scribed for   Ant by Nenita Carrillo. I , Dr Cain, have personally reviewed and agreed with the information entered by the Virtual Scribe.         Nenita Carrillo

## 2023-12-01 DIAGNOSIS — R11.0 NAUSEA: ICD-10-CM

## 2023-12-01 DIAGNOSIS — J34.89 OTHER SPECIFIED DISORDERS OF NOSE AND NASAL SINUSES: ICD-10-CM

## 2023-12-01 DIAGNOSIS — I48.91 UNSPECIFIED ATRIAL FIBRILLATION (MULTI): ICD-10-CM

## 2023-12-01 RX ORDER — AMIODARONE HYDROCHLORIDE 200 MG/1
200 TABLET ORAL DAILY
Qty: 90 TABLET | Refills: 1 | Status: SHIPPED | OUTPATIENT
Start: 2023-12-01

## 2023-12-01 RX ORDER — ONDANSETRON 4 MG/1
4 TABLET, FILM COATED ORAL DAILY PRN
Qty: 15 TABLET | Refills: 0 | Status: SHIPPED | OUTPATIENT
Start: 2023-12-01 | End: 2024-02-13 | Stop reason: WASHOUT

## 2023-12-01 RX ORDER — AZELASTINE 1 MG/ML
2 SPRAY, METERED NASAL 2 TIMES DAILY
Qty: 90 ML | Refills: 1 | Status: SHIPPED | OUTPATIENT
Start: 2023-12-01

## 2023-12-05 ENCOUNTER — OFFICE VISIT (OUTPATIENT)
Dept: OTOLARYNGOLOGY | Facility: CLINIC | Age: 83
End: 2023-12-05
Payer: COMMERCIAL

## 2023-12-05 VITALS — BODY MASS INDEX: 23.72 KG/M2 | HEIGHT: 73 IN | WEIGHT: 179 LBS

## 2023-12-05 DIAGNOSIS — J34.89 NASAL AND SINUS DISCHARGE: ICD-10-CM

## 2023-12-05 DIAGNOSIS — J34.2 NASAL SEPTAL DEVIATION: ICD-10-CM

## 2023-12-05 DIAGNOSIS — J34.89 NASAL LESION: Primary | ICD-10-CM

## 2023-12-05 PROCEDURE — 31231 NASAL ENDOSCOPY DX: CPT | Performed by: NURSE PRACTITIONER

## 2023-12-05 PROCEDURE — 1126F AMNT PAIN NOTED NONE PRSNT: CPT | Performed by: NURSE PRACTITIONER

## 2023-12-05 PROCEDURE — 99213 OFFICE O/P EST LOW 20 MIN: CPT | Performed by: NURSE PRACTITIONER

## 2023-12-05 PROCEDURE — 1036F TOBACCO NON-USER: CPT | Performed by: NURSE PRACTITIONER

## 2023-12-05 PROCEDURE — 1160F RVW MEDS BY RX/DR IN RCRD: CPT | Performed by: NURSE PRACTITIONER

## 2023-12-05 PROCEDURE — 1159F MED LIST DOCD IN RCRD: CPT | Performed by: NURSE PRACTITIONER

## 2023-12-05 RX ORDER — AZELASTINE 1 MG/ML
SPRAY, METERED NASAL
Qty: 72 ML | Refills: 3 | Status: SHIPPED | OUTPATIENT
Start: 2023-12-05 | End: 2024-02-13 | Stop reason: SDUPTHER

## 2023-12-05 ASSESSMENT — PATIENT HEALTH QUESTIONNAIRE - PHQ9
2. FEELING DOWN, DEPRESSED OR HOPELESS: NOT AT ALL
SUM OF ALL RESPONSES TO PHQ9 QUESTIONS 1 AND 2: 0
1. LITTLE INTEREST OR PLEASURE IN DOING THINGS: NOT AT ALL

## 2023-12-05 NOTE — PROGRESS NOTES
"Subjective   Patient ID: Greg Comer \"Omari" is a 83 y.o. male who presents for No chief complaint on file..  HPI  Patient is an 81yo M with history of chronic rhinitis with temporary improvement with ipratropium bromide. Rec. trial of azelastine prior to consideration of procedures.   12/5/23- Patient presents for follow-up visit. He notes that he has been having improvement everyday to his nasal drainage. He notes that this is consistently clear, a little worse in the morning. He has the most benefit with azelastine and uses this consistently. He has been very well.     I personally reviewed the patients CT scan images and results. I discussed the results personally with the patient. The following findings were discussed: 10/19/23: CT Head: Left nasal septal deviation. Minimal mucosal thickening in the right frontal sinus. Otherwise, paranasal sinuses are clear.     Review of Systems  Review of systems is negative for constitutional, ophthalmological, cardiac, pulmonary, renal, gastrointestinal, musculoskeletal, mental health, endocrine, or neurologic disorders (except as listed in the HPI, PMH, and Problem List).     Objective   Physical Exam  CONSTITUTIONAL: Patient appears well developed and well nourished.   GENERAL: this is a healthy appearing male who appears stated age. The patient is alert and appropriately verbally conversant without hoarseness. This patient is in no apparent distress.   FACE: The face was inspected and no cutaneous masses or lesions were visualized. There was no erythema or edema noted. Facial movement was symmetric. No skin lesions were detected.   EYES: Extra-ocular muscle function was intact. No nystagmus was observed. Pupils were equal.   NOSE: Examination of the nose revealed the nasal dorsum to be midline. Intranasal exam reveals the septum is left deviated. The inferior turbinates were hyperemic. No masses, polyps, mucopus, or other lesion on anterior rhinoscopy. See below " procedure note as applicable for further exam.  RESPIRATORY: Normal inspiration and expiration and chest wall expansion, no use of accessory muscles to breathe, no stridor.  NEUROLOGICAL: Patient is ambulatory without assist. Mentation is clear. Answering questions appropriately.     Nasal / sinus endoscopy    Date/Time: 12/5/2023 11:33 AM    Performed by: ISABEL Dunlap  Authorized by: ISABEL Dunlap    Consent:     Consent obtained:  Verbal    Consent given by:  Patient    Risks discussed:  Bleeding, infection and pain    Alternatives discussed:  No treatment, observation and delayed treatment  Procedure details:     Indications: assessment of airway and sino-nasal symptoms      Medication:  Afrin and Miles-Synephrine 2%    Instrument: flexible fiberoptic nasal endoscope      Scope location: bilateral nare    Post-procedure details:     Patient tolerance of procedure:  Tolerated well, no immediate complications  Comments:      Findings: After topical decongestion with decongestant and anesthetic spray, nasal endoscopy was performed using an endoscope. The septum was deviated to the left. The inferior turbinates were hyperemic. The middle turbinates appeared healthy, the middle meatus is free of purulence, masses, lesions or polyps. On the right middle turbinate, continued erythematous lesion along the anterior aspect of the turbinate. The superior meatus and sphenoethmoid recess are clear bilaterally. The nasal passageway is patent. The nasopharynx was clear. There were no complications and the patient tolerated the procedure well.       Assessment/Plan   ASSESSMENT:  Patient is an 83yo M with history of chronic rhinitis with temporary improvement with ipratropium bromide. Rec. trial of azelastine prior to consideration of procedures.  12/5/23- Continued stable lesion along the right middle turbinate. Doing well with azelastine.      PLAN:  1. Nasal endoscopy: Stable lesion on anterior  aspect of the right middle turbinate, hyperemia of inferior turbinate, left dev.  2. I recommended that the patient continue azelastine nasal spray. Advised he discontinue for adverse effects.  3. If he continues doing well with the above therapy, he will follow up in 6 months or sooner if needed.     All questions were answered and patient agrees with established plan of care.

## 2023-12-05 NOTE — PATIENT INSTRUCTIONS
Today you were evaluated by Daphnie Cool CNP.    Please follow-up in 6 months or sooner if needed. If you have any questions or concerns, please contact my office at [phone].     Continue azelastine.     If you want a biopsy as previously discussed with Dr. Parsons, you would call his office.

## 2023-12-15 ENCOUNTER — OFFICE VISIT (OUTPATIENT)
Dept: ORTHOPEDIC SURGERY | Facility: CLINIC | Age: 83
End: 2023-12-15
Payer: COMMERCIAL

## 2023-12-15 ENCOUNTER — ANCILLARY PROCEDURE (OUTPATIENT)
Dept: RADIOLOGY | Facility: CLINIC | Age: 83
End: 2023-12-15
Payer: COMMERCIAL

## 2023-12-15 VITALS — WEIGHT: 179 LBS | HEIGHT: 73 IN | BODY MASS INDEX: 23.72 KG/M2

## 2023-12-15 DIAGNOSIS — R20.2 NUMBNESS AND TINGLING OF LEFT LOWER EXTREMITY: ICD-10-CM

## 2023-12-15 DIAGNOSIS — M25.572 LEFT ANKLE PAIN, UNSPECIFIED CHRONICITY: Primary | ICD-10-CM

## 2023-12-15 DIAGNOSIS — R26.89 BALANCE DISORDER: ICD-10-CM

## 2023-12-15 DIAGNOSIS — R20.0 NUMBNESS AND TINGLING OF LEFT LOWER EXTREMITY: ICD-10-CM

## 2023-12-15 DIAGNOSIS — M25.572 LEFT ANKLE PAIN, UNSPECIFIED CHRONICITY: ICD-10-CM

## 2023-12-15 PROCEDURE — 99214 OFFICE O/P EST MOD 30 MIN: CPT | Performed by: ORTHOPAEDIC SURGERY

## 2023-12-15 PROCEDURE — 1126F AMNT PAIN NOTED NONE PRSNT: CPT | Performed by: ORTHOPAEDIC SURGERY

## 2023-12-15 PROCEDURE — 1159F MED LIST DOCD IN RCRD: CPT | Performed by: ORTHOPAEDIC SURGERY

## 2023-12-15 PROCEDURE — 99204 OFFICE O/P NEW MOD 45 MIN: CPT | Performed by: ORTHOPAEDIC SURGERY

## 2023-12-15 PROCEDURE — 1036F TOBACCO NON-USER: CPT | Performed by: ORTHOPAEDIC SURGERY

## 2023-12-15 PROCEDURE — 1160F RVW MEDS BY RX/DR IN RCRD: CPT | Performed by: ORTHOPAEDIC SURGERY

## 2023-12-15 PROCEDURE — 73610 X-RAY EXAM OF ANKLE: CPT | Mod: LEFT SIDE | Performed by: RADIOLOGY

## 2023-12-15 PROCEDURE — 73610 X-RAY EXAM OF ANKLE: CPT | Mod: LT,FY

## 2023-12-15 ASSESSMENT — PAIN DESCRIPTION - DESCRIPTORS: DESCRIPTORS: NUMBNESS

## 2023-12-15 ASSESSMENT — PAIN SCALES - GENERAL: PAINLEVEL_OUTOF10: 10 - WORST POSSIBLE PAIN

## 2023-12-15 NOTE — PROGRESS NOTES
History of Present Illness  This is a pleasant 83-year-old male who presents today for initial evaluation of left anterior ankle numbness that is been present over the past year.  The patient reports that symptoms started immediately postop after his TAVR operation last February and has not improved.  Notably, he has full strength throughout his left ankle and foot and no numbness over the remainder of his ankle or foot.  The patient does describe some degree of functional limitation in terms of difficulty with walking as result of this numbness.  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  He reports that he has had a CT of his head which did not show any acute abnormalities.  He has not done anything to treat this.      BMI  Current BMI is 23    Pain is described as  nonpainful     Location:  anterior ankle  Pain level:  none  Assistive device: is not using any ambulatory assistive devices  History of surgery: no       Review of Systems   GENERAL: Negative for malaise, significant weight loss, fever  MUSCULOSKELETAL: see HPI  NEURO:  Negative       On exam:  WD/WN   A+O X3  NAD  No lymphedema  Inspection of both feet and ankles show symmetric arches.   5/5 ankle dorsiflexion and plantarflexion  SILT in the SPN, DPN, saphenous, and sural tibial distributions. Numbness to light touch exclusively in a patch along the anterior ankle joint      I personally reviewed the following radiographic exams: XR left ankle without fracture or dislocation    Assessment: Anterior ankle numbness    Plan: Discussed nonoperative and operative options in detail.   Risk and benefits discussed in detail. All questions answered today.  Recovery timeline and expectations discussed in detail.  It is unclear the etiology of this numbness, which may be iatrogenic due to the association immediately post op from his heart procedure. Fortunately he does not have any motor  impediments distally. Sensory deficit is nondermatomal. At this point, we will provide a referral to PT to help the patient with proprioception and balance. He is encouraged to continue activities as tolerated for general strengthening.     Patient was reviewed and discussed with [facility] and/or orthopedic resident.  Patient was seen and evaluated in conjunction with [facility] and/or orthopedic resident. Findings and treatment plan were discussed and approved by myself, Dr. Foster.  Not clear as to the underlying cause for his numbness.  Does not appear to be dermatomal.  Has no motor weakness.  Does not appear to have any sensitivity over his fibular head.  Have offered course of therapy to work on proprioception and strengthening.  Work on his balance.  No surgical intervention.

## 2023-12-18 DIAGNOSIS — Z79.2 NEED FOR PROPHYLACTIC ANTIBIOTIC: ICD-10-CM

## 2023-12-19 RX ORDER — NITROFURANTOIN 25; 75 MG/1; MG/1
100 CAPSULE ORAL ONCE
Qty: 1 CAPSULE | Refills: 0 | Status: SHIPPED | OUTPATIENT
Start: 2023-12-19 | End: 2023-12-19

## 2023-12-20 DIAGNOSIS — I50.1 HEART FAILURE, LEFT, WITH LVEF <=30% (MULTI): Primary | ICD-10-CM

## 2023-12-20 RX ORDER — CARVEDILOL 3.12 MG/1
3.12 TABLET ORAL
Qty: 180 TABLET | Refills: 3 | Status: SHIPPED | OUTPATIENT
Start: 2023-12-20 | End: 2023-12-21 | Stop reason: SDUPTHER

## 2023-12-21 RX ORDER — CARVEDILOL 3.12 MG/1
3.12 TABLET ORAL
Qty: 180 TABLET | Refills: 0 | Status: SHIPPED | OUTPATIENT
Start: 2023-12-21 | End: 2024-04-17

## 2024-01-03 ENCOUNTER — APPOINTMENT (OUTPATIENT)
Dept: UROLOGY | Facility: CLINIC | Age: 84
End: 2024-01-03
Payer: MEDICARE

## 2024-01-05 ENCOUNTER — EVALUATION (OUTPATIENT)
Dept: PHYSICAL THERAPY | Facility: CLINIC | Age: 84
End: 2024-01-05
Payer: MEDICARE

## 2024-01-05 DIAGNOSIS — M25.572 LEFT ANKLE PAIN, UNSPECIFIED CHRONICITY: ICD-10-CM

## 2024-01-05 DIAGNOSIS — R26.89 BALANCE DISORDER: ICD-10-CM

## 2024-01-05 PROCEDURE — 97161 PT EVAL LOW COMPLEX 20 MIN: CPT | Mod: GP

## 2024-01-05 PROCEDURE — 97110 THERAPEUTIC EXERCISES: CPT | Mod: GP

## 2024-01-05 ASSESSMENT — ENCOUNTER SYMPTOMS
DEPRESSION: 0
OCCASIONAL FEELINGS OF UNSTEADINESS: 0
LOSS OF SENSATION IN FEET: 0

## 2024-01-05 NOTE — PROGRESS NOTES
"Physical Therapy    Physical Therapy Evaluation and Treatment      Patient Name: Greg Comer \"Omari"  MRN: [id number]  Today's Date: 1/5/2024  Time Calculation  Start Time: 1100  Stop Time: 1140  Time Calculation (min): 40 min  Visit: 1    Assessment:  PT Assessment  Assessment Comment: Patient presents with signs and symptoms consistent with the physician’s medical diagnosis of balance abnormalities and left ankle numbness. He will benefit from medically necessary skilled physical therapy interventions in order to decrease pain and improve function with daily activities.     Plan:  OP PT Plan  Treatment/Interventions: Cryotherapy, Dry needling, Education/ Instruction, Electrical stimulation, Gait training, Hot pack, Manual therapy, Therapeutic activities, Therapeutic exercises  PT Plan: Skilled PT  PT Frequency: 1 time per week  Duration: 8 weeks  Onset Date: 01/01/22  Certification Period Start Date: 01/05/24  Certification Period End Date: 04/04/24  Number of Treatments Authorized: Med Nec  Rehab Potential: Good  Plan of Care Agreement: Patient    Current Problem:   1. Left ankle pain, unspecified chronicity  Referral to Physical Therapy      2. Balance disorder  Referral to Physical Therapy          Subjective    General:  General  Reason for Referral: balance abnormalities and left ankle numbness  Referred By: Jitendra Foster MD  Preferred Learning Style: auditory  General Comment: Patient is an 84 y/o male who was referred to outpatient physical therapy due to a balance abnormality and left ankle numbness. He reports the left ankle has been numb since his aortic valve surgery over a year ago. Patient reports that the numbness in his left ankle is negatively effecting his balance. He reports that he has participated in physical therapy before and the nustep helped.  Precautions:  Precautions  STEADI Fall Risk Score (The score of 4 or more indicates an increased risk of falling): 1  Pain:   0/10  Prior Level of " Function:   Independent with all ADLs    Objective   Cognition:   WNL  General Assessments:  Range of Motion Comments: Right ankle ROM:  Plantarflexion 65  Dorsiflexion 13  Inversion 33  Eversion  12  Left ankle ROM:  Plantarflexion 65  Dorsiflexion 13  Inversion 33    Strength Comments: LE strength (R/L)  Hip flexion 4/5, 4/5  Hip extension 4/5, 4/5  Hip abduction 4/5, 4/5  Knee flexion 5/5, 5/5  Knee extension 5/5, 5/5  Ankle df 3+/5, 3+/5  Ankle pf 4/5, 4/5  Core strength 3/5  Functional Assessments:  Gait Comment: Patient ambualtes with decreased velocity.    Outcome Measures:  LEFS: 37/80  Timed up and go: 16 seconds   5 x STS test: 15 seconds     Treatments:  Therapeutic Exercise  Therapeutic Exercise Performed: Yes  Therapeutic Exercise Activity 1: nustep x 10'  Therapeutic Exercise Activity 2: seated DF/PF x 20  Therapeutic Exercise Activity 3: 4 -way TB ankle strengthening x 30 ea direction    Goals:  Active       PT Problem       Patient will report compliance with his home exercise program.        Start:  01/05/24    Expected End:  04/04/24            Patient will report improvement through the LEFS to show improved activity tolerance.        Start:  01/05/24    Expected End:  04/04/24            Patient will report improvement in bilateral LE strength to 5/5 in all motions to facilitate weight bearing activity.        Start:  01/05/24    Expected End:  04/04/24

## 2024-01-09 NOTE — PROGRESS NOTES
Physical Therapy    Physical Therapy Treatment    Patient Name: Greg Comer  MRN: [id number]  Today's Date: 1/9/2024         Assessment:     Plan:       Current Problem  1. Left ankle pain, unspecified chronicity        2. Balance disorder            General          Subjective    Precautions     Vital Signs     Pain       Objective   Cognition     Posture     Extremity/Trunk Assessment  {Extremity/Trunk Assessments:00528}  {Spine,UE,LE,HAND,LYMPHEDEMA:03800}  Activity Tolerance:     Outcome Measures:  {PT Outcome Measures:03657}  Treatments:  {PT Treatments:94451}    OP EDUCATION:       Goals:  Active       PT Problem       Patient will report compliance with his home exercise program.        Start:  01/05/24    Expected End:  04/04/24            Patient will report improvement through the LEFS to show improved activity tolerance.        Start:  01/05/24    Expected End:  04/04/24            Patient will report improvement in bilateral LE strength to 5/5 in all motions to facilitate weight bearing activity.        Start:  01/05/24    Expected End:  04/04/24

## 2024-01-10 ENCOUNTER — APPOINTMENT (OUTPATIENT)
Dept: PHYSICAL THERAPY | Facility: CLINIC | Age: 84
End: 2024-01-10
Payer: MEDICARE

## 2024-01-11 ENCOUNTER — APPOINTMENT (OUTPATIENT)
Dept: PRIMARY CARE | Facility: HOSPITAL | Age: 84
End: 2024-01-11
Payer: COMMERCIAL

## 2024-01-15 ENCOUNTER — LAB (OUTPATIENT)
Dept: LAB | Facility: LAB | Age: 84
End: 2024-01-15
Payer: MEDICARE

## 2024-01-15 DIAGNOSIS — N39.0 URINARY TRACT INFECTION WITHOUT HEMATURIA, SITE UNSPECIFIED: Primary | ICD-10-CM

## 2024-01-15 DIAGNOSIS — Z79.2 NEED FOR PROPHYLACTIC ANTIBIOTIC: ICD-10-CM

## 2024-01-15 DIAGNOSIS — N39.0 URINARY TRACT INFECTION WITHOUT HEMATURIA, SITE UNSPECIFIED: ICD-10-CM

## 2024-01-15 PROCEDURE — 87186 SC STD MICRODIL/AGAR DIL: CPT

## 2024-01-15 PROCEDURE — 87086 URINE CULTURE/COLONY COUNT: CPT

## 2024-01-15 RX ORDER — NITROFURANTOIN 25; 75 MG/1; MG/1
100 CAPSULE ORAL EVERY 12 HOURS
Qty: 14 CAPSULE | Refills: 0 | Status: SHIPPED | OUTPATIENT
Start: 2024-01-15 | End: 2024-01-22

## 2024-01-16 ENCOUNTER — APPOINTMENT (OUTPATIENT)
Dept: PHYSICAL THERAPY | Facility: CLINIC | Age: 84
End: 2024-01-16
Payer: MEDICARE

## 2024-01-17 ENCOUNTER — APPOINTMENT (OUTPATIENT)
Dept: UROLOGY | Facility: CLINIC | Age: 84
End: 2024-01-17
Payer: MEDICARE

## 2024-01-18 LAB — BACTERIA UR CULT: ABNORMAL

## 2024-01-18 RX ORDER — NITROFURANTOIN 25; 75 MG/1; MG/1
100 CAPSULE ORAL EVERY 12 HOURS
Qty: 10 CAPSULE | Refills: 0 | Status: SHIPPED | OUTPATIENT
Start: 2024-01-18 | End: 2024-01-23

## 2024-01-19 DIAGNOSIS — R11.0 NAUSEA: ICD-10-CM

## 2024-01-21 DIAGNOSIS — I25.2 OLD MYOCARDIAL INFARCTION: ICD-10-CM

## 2024-01-22 RX ORDER — CLOPIDOGREL BISULFATE 75 MG/1
75 TABLET ORAL DAILY
Qty: 90 TABLET | Refills: 1 | Status: SHIPPED | OUTPATIENT
Start: 2024-01-22

## 2024-01-25 ENCOUNTER — OFFICE VISIT (OUTPATIENT)
Dept: CARDIOLOGY | Facility: HOSPITAL | Age: 84
End: 2024-01-25
Payer: MEDICARE

## 2024-01-25 VITALS
HEIGHT: 73 IN | SYSTOLIC BLOOD PRESSURE: 119 MMHG | OXYGEN SATURATION: 90 % | BODY MASS INDEX: 23.72 KG/M2 | HEART RATE: 76 BPM | DIASTOLIC BLOOD PRESSURE: 74 MMHG | WEIGHT: 179 LBS

## 2024-01-25 DIAGNOSIS — I25.10 ASCVD (ARTERIOSCLEROTIC CARDIOVASCULAR DISEASE): ICD-10-CM

## 2024-01-25 DIAGNOSIS — I42.8 NONISCHEMIC CARDIOMYOPATHY (MULTI): ICD-10-CM

## 2024-01-25 DIAGNOSIS — I48.0 PAROXYSMAL ATRIAL FIBRILLATION (MULTI): ICD-10-CM

## 2024-01-25 DIAGNOSIS — I50.1 HEART FAILURE, LEFT, WITH LVEF <=30% (MULTI): Primary | ICD-10-CM

## 2024-01-25 PROCEDURE — 1159F MED LIST DOCD IN RCRD: CPT | Performed by: INTERNAL MEDICINE

## 2024-01-25 PROCEDURE — 1160F RVW MEDS BY RX/DR IN RCRD: CPT | Performed by: INTERNAL MEDICINE

## 2024-01-25 PROCEDURE — 99214 OFFICE O/P EST MOD 30 MIN: CPT | Performed by: INTERNAL MEDICINE

## 2024-01-25 PROCEDURE — 1036F TOBACCO NON-USER: CPT | Performed by: INTERNAL MEDICINE

## 2024-01-25 PROCEDURE — 1126F AMNT PAIN NOTED NONE PRSNT: CPT | Performed by: INTERNAL MEDICINE

## 2024-01-25 RX ORDER — FLUOCINOLONE ACETONIDE 0.11 MG/ML
OIL TOPICAL 3 TIMES DAILY
COMMUNITY

## 2024-01-25 RX ORDER — ALBUTEROL SULFATE 90 UG/1
2 AEROSOL, METERED RESPIRATORY (INHALATION) EVERY 4 HOURS PRN
COMMUNITY

## 2024-01-25 RX ORDER — MELATONIN 3 MG
3 CAPSULE ORAL NIGHTLY
COMMUNITY

## 2024-01-25 ASSESSMENT — PAIN SCALES - GENERAL: PAINLEVEL: 0-NO PAIN

## 2024-01-25 ASSESSMENT — PATIENT HEALTH QUESTIONNAIRE - PHQ9
1. LITTLE INTEREST OR PLEASURE IN DOING THINGS: NOT AT ALL
2. FEELING DOWN, DEPRESSED OR HOPELESS: NOT AT ALL
SUM OF ALL RESPONSES TO PHQ9 QUESTIONS 1 AND 2: 0

## 2024-01-25 ASSESSMENT — COLUMBIA-SUICIDE SEVERITY RATING SCALE - C-SSRS
1. IN THE PAST MONTH, HAVE YOU WISHED YOU WERE DEAD OR WISHED YOU COULD GO TO SLEEP AND NOT WAKE UP?: NO
2. HAVE YOU ACTUALLY HAD ANY THOUGHTS OF KILLING YOURSELF?: NO
6. HAVE YOU EVER DONE ANYTHING, STARTED TO DO ANYTHING, OR PREPARED TO DO ANYTHING TO END YOUR LIFE?: NO

## 2024-01-25 NOTE — PROGRESS NOTES
[facility]  Primary Care Physician: No primary care provider on file.  Referring Provider/Cardiologist: n/a     Date of Visit: 01/25/2024  2:40 PM EST  Location of visit: [facility]     HPI:   Mr. Comer is an 83M with a PMHx sig for CAD s/p PCI (LAD ; 10/2021), LFLG severe AS s/p BAV (10/2021) followed by TAVR (#34 EvolutR; 2/22/22), stage C systolic HF/mixed NICM-ICM/HFrEF with moderate to severe LV dysfunction s/p ICD, pAF on AAD and DOAC therapies, and BPH who returns to the Advanced Heart Failure clinic for ongoing evaluation and management of his cardiomyopathy.     Interval hx:   Currently denies chest pain, palpitations, or LE edema. He does experience exertional dyspnea, but denies orthopnea, PND.        Hospitalizations: Denies   Medication adherence: Reports adherence       PM/SHx:  CAD s/p PCI (LAD ; 10/2021), LFLG severe AS s/p BAV (10/2021) followed by TAVR (#34 EvolutR; 2/22/22), stage C systolic HF/mixed NICM-ICM/HFrEF with moderate to severe LV dysfunction s/p ICD, pAF on AAD and DOAC therapies, BPH, h/o prostate CA, s/p spinal canal neural stimulator, BPH s/p TURP    SocHx:   , lives with his wife in [place]  Denies smoking or illicits. Reports ETOH (glass of wine every night)    FamHx:   Denies CAD/cardiomyopathy        Current Outpatient Medications   Medication Sig Dispense Refill    albuterol (ProAir HFA) 90 mcg/actuation inhaler Inhale 2 puffs every 4 hours if needed for wheezing.      amiodarone (Pacerone) 200 mg tablet TAKE 1 TABLET DAILY 90 tablet 1    atorvastatin (Lipitor) 40 mg tablet Take 1 tablet (40 mg) by mouth once daily.      azelastine (Astelin) 137 mcg (0.1 %) nasal spray Use 2 sprays in each nostril twice daily as needed for nasal drainage. 72 mL 3    carvedilol (Coreg) 3.125 mg tablet Take 1 tablet (3.125 mg) by mouth 2 times a day with meals. 180 tablet 0    clopidogrel (Plavix) 75 mg tablet Take 1 tablet  (75 mg) by mouth once daily. 90 tablet 1    docusate sodium (Colace) 100 mg capsule Take 1 capsule (100 mg) by mouth twice a day.      dupilumab (Dupixent) 300 mg/2 mL injection Inject 2 mL (300 mg) under the skin 1 time.      Eliquis 5 mg tablet Take 1 tablet (5 mg) by mouth every 12 hours.      Entresto 49-51 mg tablet       fish oil concentrate (Omega-3) 120-180 mg capsule Take 1 capsule (1 g) by mouth once daily.      fluocinolone (Derma-Smoothe) 0.01 % external oil Apply topically 3 times a day.      Jardiance 10 mg TAKE 1 TABLET BY MOUTH EVERY DAY 90 tablet 3    melatonin 3 mg capsule Take 3 mg by mouth once daily at bedtime.      multivitamin tablet Take 1 tablet by mouth once daily.      Myrbetriq 50 mg tablet extended release 24 hr 24 hr tablet Take 1 tablet (50 mg) by mouth once daily. 90 tablet 3    nitroglycerin (Nitrostat) 0.4 mg SL tablet Place 1 tablet (0.4 mg) under the tongue every 5 minutes if needed. UP TO 3 DOSES AS NEEDED FOR CHEST PAIN.      ondansetron (Zofran) 4 mg tablet TAKE 1 TABLET BY MOUTH EVERY DAY AS NEEDED 15 tablet 0    sildenafil (Viagra) 100 mg tablet Take 1 tablet (100 mg) by mouth if needed.      spironolactone (Aldactone) 25 mg tablet Take 1 tablet (25 mg) by mouth once daily.      tacrolimus (Protopic) 0.1 % ointment Apply 1 Application topically 2 times a day.      traZODone (Desyrel) 100 mg tablet Take 1 tablet (100 mg) by mouth once daily at bedtime.      triamcinolone (Kenalog) 0.1 % cream Apply 1 Application topically.      vit C/E/Zn/coppr/lutein/zeaxan (PRESERVISION AREDS-2 ORAL) Take 2 capsules by mouth once daily.      acetaminophen (Tylenol) 500 mg tablet Take 1 tablet (500 mg) by mouth every 6 hours if needed.      azelastine (Astelin) 137 mcg (0.1 %) nasal spray USE 2 SPRAYS INTO EACH NOSTRIL TWICE A DAY 90 mL 1    biotin 1 mg capsule Take 1 capsule (1 mg) by mouth once daily.      finasteride (Proscar) 5 mg tablet Take 1 tablet (5 mg) by mouth once daily.       "furosemide (Lasix) 20 mg tablet Take 1 tablet (20 mg) by mouth once daily as needed.      hydrOXYzine HCL (Atarax) 10 mg tablet Take 1 tablet (10 mg) by mouth 3 times a day as needed.      ipratropium (Atrovent) 21 mcg (0.03 %) nasal spray SPRAY 2 SPRAYS INTO EACH NOSTRIL 3 TIMES A DAY AS NEEDED (Patient not taking: Reported on 1/25/2024) 30 mL 0    ipratropium (Atrovent) 42 mcg (0.06 %) nasal spray USE 2 SPRAYS IN EACH NOSTRIL 3-4 TIMES DAILY. 15 mL 11    pantoprazole (ProtoNix) 40 mg EC tablet Take 1 tablet (40 mg) by mouth once daily.      tamsulosin (Flomax) 0.4 mg 24 hr capsule Take 1 capsule (0.4 mg) by mouth once daily.      traZODone (Desyrel) 50 mg tablet Take 1 tablet (50 mg) by mouth once daily as needed.       No current facility-administered medications for this visit.       No Known Allergies      Visit Vitals  /74 (BP Location: Left arm, Patient Position: Sitting, BP Cuff Size: Adult)   Pulse 76   Ht 1.854 m (6' 1\")   Wt 81.2 kg (179 lb)   SpO2 90%   BMI 23.62 kg/m²   Smoking Status Never   BSA 2.04 m²        Physical Exam:  On exam Mr. Comer appears his stated age, is alert and oriented x3, and in no acute distress. His sclera are anicteric and his oropharynx has moist mucous membranes. His neck is supple and without thyromegaly. The JVP is ~6 cm of water above the right atrium. His cardiac exam has regular rhythm, normal S1, S2. No S3/4. There are no murmurs. His lungs are clear to auscultation bilaterally and there is no dullness to percussion. His abdomen is soft, nontender with normoactive bowel sounds. There is no HJR. The extremities are warm and without edema. The skin is dry. There is no rash present. The distal pulses are 2+ in all four extremities. His mood and affect are appropriate for todays encounter.       Cardiac Labs/Diagnostics:    Lab Results   Component Value Date    CREATININE 0.96 09/27/2023    BUN 18 09/27/2023     09/27/2023    K 4.8 09/27/2023     " 09/27/2023    CO2 32 09/27/2023        Recent Labs     02/06/23  1053   CHOL 182   LDLF 83   HDL 64.6   TRIG 171*       Recent Labs     02/06/23  1053 04/28/22  1201 12/06/21  1550 10/13/21  1912   * 442* 3,050* 1,222*     ECG (5/11/23):  Sinus rhythm (HR 72), RBBB, LAFB, LVH    Echo (2/6/23):  1. Left ventricular systolic function is moderately to severely decreased with a 30-35% estimated ejection fraction.  2. There is a transcatheter aortic valve replacement.  3. Compared with study from 3/22/2022, peak gradient of aortic valve is less (previously 15 mmHg).    Echo (3/22/22):  1. The left ventricular systolic function is moderately decreased with a 30-35% estimated ejection fraction.  2. There is global hypokinesis of the left ventricle with minor regional variations.  3. Spectral Doppler shows an impaired relaxation pattern of left ventricular diastolic filling.  4. No left ventricular thrombus visualized.  5. The left atrium is severely dilated.  6. RVSP within normal limits.  7. There is a transcatheter aortic valve replacement.    ECG (2/23/22):  Sinus rhythm (HR 75) with PACs, LVH with repol     Impression/Plan:  Mr. Comer is an 83M with a PMHx sig for CAD s/p PCI (LAD ; 10/2021), LFLG severe AS s/p BAV (10/2021) followed by TAVR (#34 EvolutR; 2/22/22), stage C systolic HF/mixed NICM-ICM/HFrEF with moderate to severe LV dysfunction s/p ICD, pAF on AAD and DOAC therapies, and BPH who returns to the Advanced Heart Failure clinic for ongoing evaluation and management of his cardiomyopathy. At the current time he has functional class II symptoms and appears euvolemic on exam.     1) Stage C chronic systolic HF/mixed NICM-ICM/HFrEF with moderate to severe LV dysfunction (LVEF 30-35%; 2/2023) s/p ICD  Most recent BNP markedly improved (154; 2/6/23) with ongoing GDMT.   -c/w carvedilol 3.125 mg bid, entresto 49/51 mg bid, jardiance 10 mg daily, spironolactone 25 mg daily, , furosemide 20 mg  daily/prn  -repeat labs (RFP/BNP)    2) LFLG severe AS s/p BAV (10/2021) followed by TAVR (#34 EvolutR; 2/22/22)   Stable on most recent echo.     3) pAF on AAD and DOAC therapies  No bleeding.   -c/w amiodarone 200 mg daily, eliquis 5 mg bid  -will need updated amio testing (LFTs/TFTs/CXR/PFTs)     4) CAD s/p PCI (LAD ; 10/2021)  Lipids (2/6/23): tChol 182, HDL 64, LDL 83, trigs 171  Denies angina.   -c/w clopidogrel, sans ASA given DOAC use  -c/w atorvastatin 40 mg daily, BB      F/U: 6 months at /[facility]       ____________________________________________________________  Calvin Calderon DO  Section of Advanced Heart Failure and Cardiac Transplantation  Division of Cardiovascular Medicine  [facility]  [facility]

## 2024-01-25 NOTE — PATIENT INSTRUCTIONS
It was a pleasure seeing you today. Please contact myself or my team with any questions.     To reach Dr. Calderon' office please call [phone] ([place]).   Fax: [phone]   To schedule an appointment call [phone]     If you have any questions or need cardiac medication refills, please call the Heart Failure office at [phone], option 6. You may also contact the  Heart Failure Nursing team via email at [email or web address] (Please include your name and date of birth).       1) Continue your current medications  2) Labs at your convenience (CMP, BNP, TSH with reflex, lipids)  3) We will do a chest xray and PFTs  4) Follow up in 6 months at /[facility]

## 2024-01-31 ENCOUNTER — APPOINTMENT (OUTPATIENT)
Dept: PHYSICAL THERAPY | Facility: CLINIC | Age: 84
End: 2024-01-31
Payer: MEDICARE

## 2024-01-31 ENCOUNTER — PROCEDURE VISIT (OUTPATIENT)
Dept: UROLOGY | Facility: CLINIC | Age: 84
End: 2024-01-31
Payer: MEDICARE

## 2024-01-31 VITALS
DIASTOLIC BLOOD PRESSURE: 76 MMHG | HEART RATE: 78 BPM | SYSTOLIC BLOOD PRESSURE: 155 MMHG | WEIGHT: 179 LBS | BODY MASS INDEX: 23.72 KG/M2 | TEMPERATURE: 97.1 F | HEIGHT: 73 IN

## 2024-01-31 DIAGNOSIS — N40.1 BENIGN PROSTATIC HYPERPLASIA WITH URINARY OBSTRUCTION: Primary | ICD-10-CM

## 2024-01-31 DIAGNOSIS — N52.9 ERECTILE DYSFUNCTION, UNSPECIFIED ERECTILE DYSFUNCTION TYPE: ICD-10-CM

## 2024-01-31 DIAGNOSIS — N13.8 BENIGN PROSTATIC HYPERPLASIA WITH URINARY OBSTRUCTION: Primary | ICD-10-CM

## 2024-01-31 LAB
POC APPEARANCE, URINE: CLEAR
POC BILIRUBIN, URINE: NEGATIVE
POC BLOOD, URINE: ABNORMAL
POC COLOR, URINE: YELLOW
POC GLUCOSE, URINE: ABNORMAL MG/DL
POC KETONES, URINE: NEGATIVE MG/DL
POC LEUKOCYTES, URINE: ABNORMAL
POC NITRITE,URINE: NEGATIVE
POC PH, URINE: 7 PH
POC PROTEIN, URINE: ABNORMAL MG/DL
POC SPECIFIC GRAVITY, URINE: 1.02
POC UROBILINOGEN, URINE: 0.2 EU/DL

## 2024-01-31 PROCEDURE — 99214 OFFICE O/P EST MOD 30 MIN: CPT | Performed by: UROLOGY

## 2024-01-31 PROCEDURE — 52000 CYSTOURETHROSCOPY: CPT | Performed by: UROLOGY

## 2024-01-31 PROCEDURE — 81003 URINALYSIS AUTO W/O SCOPE: CPT | Performed by: UROLOGY

## 2024-01-31 RX ORDER — NITROFURANTOIN 25; 75 MG/1; MG/1
100 CAPSULE ORAL EVERY 12 HOURS
Qty: 4 CAPSULE | Refills: 0 | Status: SHIPPED | OUTPATIENT
Start: 2024-01-31 | End: 2024-02-02

## 2024-01-31 ASSESSMENT — ENCOUNTER SYMPTOMS
OCCASIONAL FEELINGS OF UNSTEADINESS: 0
DEPRESSION: 0
LOSS OF SENSATION IN FEET: 0

## 2024-01-31 ASSESSMENT — PATIENT HEALTH QUESTIONNAIRE - PHQ9
1. LITTLE INTEREST OR PLEASURE IN DOING THINGS: NOT AT ALL
SUM OF ALL RESPONSES TO PHQ9 QUESTIONS 1 AND 2: 0
2. FEELING DOWN, DEPRESSED OR HOPELESS: NOT AT ALL

## 2024-01-31 ASSESSMENT — COLUMBIA-SUICIDE SEVERITY RATING SCALE - C-SSRS
6. HAVE YOU EVER DONE ANYTHING, STARTED TO DO ANYTHING, OR PREPARED TO DO ANYTHING TO END YOUR LIFE?: NO
2. HAVE YOU ACTUALLY HAD ANY THOUGHTS OF KILLING YOURSELF?: NO
1. IN THE PAST MONTH, HAVE YOU WISHED YOU WERE DEAD OR WISHED YOU COULD GO TO SLEEP AND NOT WAKE UP?: NO

## 2024-01-31 ASSESSMENT — PAIN SCALES - GENERAL: PAINLEVEL: 0-NO PAIN

## 2024-01-31 NOTE — PROGRESS NOTES
Subjective   Greg Comer is a 83 y.o. male with history of BPH with LUTS on Myrbetriq 50mg and erectile dysfunction. He presents today for cystoscopy due to urinary frequency and urgency. He reports Myrbetriq controls urinary symptoms 80% of the time. Patient had UTI on 1/15/24, urine culture was positive for e.coli. Patient has complaints of erectile dysfunction, he previously tried PDE5 inhibitors with good response. Denies any recent gross hematuria, fevers, chills, urinary retention, nausea or vomiting.      Past Medical History:   Diagnosis Date    Personal history of other diseases of the circulatory system 02/16/2022    History of aortic valve stenosis     Past Surgical History:   Procedure Laterality Date    CT HEAD ANGIO W AND WO IV CONTRAST  10/20/2021    CT HEAD ANGIO W AND WO IV CONTRAST 10/20/2021 [facility]     No family history on file.  Current Outpatient Medications   Medication Sig Dispense Refill    albuterol (ProAir HFA) 90 mcg/actuation inhaler Inhale 2 puffs every 4 hours if needed for wheezing.      amiodarone (Pacerone) 200 mg tablet TAKE 1 TABLET DAILY 90 tablet 1    atorvastatin (Lipitor) 40 mg tablet Take 1 tablet (40 mg) by mouth once daily.      azelastine (Astelin) 137 mcg (0.1 %) nasal spray USE 2 SPRAYS INTO EACH NOSTRIL TWICE A DAY 90 mL 1    azelastine (Astelin) 137 mcg (0.1 %) nasal spray Use 2 sprays in each nostril twice daily as needed for nasal drainage. 72 mL 3    carvedilol (Coreg) 3.125 mg tablet Take 1 tablet (3.125 mg) by mouth 2 times a day with meals. 180 tablet 0    clopidogrel (Plavix) 75 mg tablet Take 1 tablet (75 mg) by mouth once daily. 90 tablet 1    docusate sodium (Colace) 100 mg capsule Take 1 capsule (100 mg) by mouth twice a day.      dupilumab (Dupixent) 300 mg/2 mL injection Inject 2 mL (300 mg) under the skin 1 time.      Eliquis 5 mg tablet Take 1 tablet (5 mg) by mouth every 12 hours.      Entresto 49-51 mg tablet       fish oil concentrate  (Omega-3) 120-180 mg capsule Take 1 capsule (1 g) by mouth once daily.      fluocinolone (Derma-Smoothe) 0.01 % external oil Apply topically 3 times a day.      furosemide (Lasix) 20 mg tablet Take 1 tablet (20 mg) by mouth once daily as needed.      ipratropium (Atrovent) 42 mcg (0.06 %) nasal spray USE 2 SPRAYS IN EACH NOSTRIL 3-4 TIMES DAILY. 15 mL 11    Jardiance 10 mg TAKE 1 TABLET BY MOUTH EVERY DAY 90 tablet 3    melatonin 3 mg capsule Take 3 mg by mouth once daily at bedtime.      multivitamin tablet Take 1 tablet by mouth once daily.      Myrbetriq 50 mg tablet extended release 24 hr 24 hr tablet Take 1 tablet (50 mg) by mouth once daily. 90 tablet 3    nitroglycerin (Nitrostat) 0.4 mg SL tablet Place 1 tablet (0.4 mg) under the tongue every 5 minutes if needed. UP TO 3 DOSES AS NEEDED FOR CHEST PAIN.      ondansetron (Zofran) 4 mg tablet TAKE 1 TABLET BY MOUTH EVERY DAY AS NEEDED 15 tablet 0    sildenafil (Viagra) 100 mg tablet Take 1 tablet (100 mg) by mouth if needed.      spironolactone (Aldactone) 25 mg tablet Take 1 tablet (25 mg) by mouth once daily.      tacrolimus (Protopic) 0.1 % ointment Apply 1 Application topically 2 times a day.      traZODone (Desyrel) 100 mg tablet Take 1 tablet (100 mg) by mouth once daily at bedtime.      triamcinolone (Kenalog) 0.1 % cream Apply 1 Application topically.      vit C/E/Zn/coppr/lutein/zeaxan (PRESERVISION AREDS-2 ORAL) Take 2 capsules by mouth once daily.       No current facility-administered medications for this visit.     No Known Allergies  Social History     Socioeconomic History    Marital status:      Spouse name: Not on file    Number of children: Not on file    Years of education: Not on file    Highest education level: Not on file   Occupational History    Not on file   Tobacco Use    Smoking status: Never    Smokeless tobacco: Never   Substance and Sexual Activity    Alcohol use: Not on file    Drug use: Not on file    Sexual activity: Not  "on file   Other Topics Concern    Not on file   Social History Narrative    Not on file     Social Determinants of Health     Financial Resource Strain: Not on file   Food Insecurity: Not on file   Transportation Needs: Not on file   Physical Activity: Not on file   Stress: Not on file   Social Connections: Not on file   Intimate Partner Violence: Not on file   Housing Stability: Not on file       Review of Systems  Pertinent items are noted in HPI.    Objective   PROCEDURE NOTE:    PREOPERATIVE DIAGNOSIS:  BPH with LUTS     POSTOPERATIVE DIAGNOSIS:  Same    OPERATION:  Flexible Cystourethroscopy      ANESTHESIA:  2%  lidocaine jelly    COMPLICATIONS:  None    EBL: Minimal    DISPOSITION:  The patient was discharged home after the procedure, per routine.    INDICATIONS: :  Mr. Comer is a 83 y.o. patient with a history of BPH with LUTS who presents today for Cystoscopy.     The indications, risks and benefits of this procedure were discussed with the patient, consent was obtained prior to the procedure, and to the best of my judgement the patient seemed to understand and agree to the procedure.    PROCEDURE:  The patient  was brought into the procedure suite and informed consent was reviewed and confirmed. Vital signs were obtained prior to the procedure: /76   Pulse 78   Temp 36.2 °C (97.1 °F)   Ht 1.854 m (6' 1\")   Wt 81.2 kg (179 lb)   BMI 23.62 kg/m² .  The patient was escorted onto the stretcher, placed supine, prepped with betadine and draped in the usual standard surgical fashion.  Intraurethral 2% viscous lidocaine jelly was used for local analgesia.  A 16 Sinhala flexible cystourethroscope was inserted into the urethra.       The penile urethra was normal.    Bladder: Upon entering the bladder the entire bladder was surveyed in a 360 degree fashion.  The left and right ureteral orifices were in normal orthotopic position effluxing clear yellow urine, bilaterally.   There was no evidence of any " bladder lesions, foreign objects, stones or evidence of any mucosal changes. The cystoscope was then retroflexed.  The bladder neck was then further examined without any evidence of lesions.     The scope was then removed and in an antegrade fashion, the urethra and bladder were again resurveyed with no evidence of additional lesions.  The cystoscope was then fully removed.   The patient tolerated the procedure well.  Vitals were stable after the procedure.  The patient was able to void and was discharged home.  Verbal and written Post procedure instructions were reviewed with the patient.    IMPRESSION:    Widely patent channel with minimal residual adenoma s/p PVP, distended heavily trabeculated bladder       Lab Review  Lab Results   Component Value Date    WBC 8.9 06/15/2023    RBC 4.86 06/15/2023    HGB 14.0 06/15/2023    HCT 42.9 06/15/2023     06/15/2023      Lab Results   Component Value Date    BUN 18 09/27/2023    CREATININE 0.96 09/27/2023        Urine analysis shows +leukocytes, +blood, +glucose      Assessment/Plan   Diagnoses and all orders for this visit:  Benign prostatic hyperplasia with urinary obstruction  -     POCT UA Automated manually resulted  -     Cystourethroscopy  Erectile dysfunction, unspecified erectile dysfunction type    BPH with LUTS s/p PVP on Myrbetriq 50mg.    Cystoscopy today showed a widely patent channel with minimal residual adenoma s/p PVP, distended heavily trabeculated bladder     I discussed with the patient that urinary symptoms are likely due to chronically damaged bladder and no surgical intervention is required.     We will continue with Myrbetriq 50mg.     2. Erectile Dysfunction     Patient has complaints of erectile dysfunction, he previously tried PDE5 inhibitors with good response.     He currently has nitroglycerin listed as one of his active medications which is contraindicated in PDE5 inhibitors, patient will discuss this further with his cardiologist.      We discussed penile injections as an alternative to PDE5 inhibitors, however, patient is not interested in this.     All questions were answered to the patient's satisfaction. Patient agrees with the plan and wishes to proceed. Follow-up will be scheduled appropriately.     I spent 30 minutes of dedicated E&M time, including preparation and review of records, notes, and data, time spent with patient/family, and documentation.     Scribed for Dr. Cain by Nenita Carrillo. I , Dr Cain, have personally reviewed and agreed with the information entered by the Virtual Scribe.

## 2024-02-01 ENCOUNTER — HOSPITAL ENCOUNTER (OUTPATIENT)
Dept: RADIOLOGY | Facility: HOSPITAL | Age: 84
Discharge: HOME | End: 2024-02-01
Payer: MEDICARE

## 2024-02-01 ENCOUNTER — LAB (OUTPATIENT)
Dept: LAB | Facility: LAB | Age: 84
End: 2024-02-01
Payer: MEDICARE

## 2024-02-01 DIAGNOSIS — I50.1 HEART FAILURE, LEFT, WITH LVEF <=30% (MULTI): ICD-10-CM

## 2024-02-01 LAB
ALBUMIN SERPL BCP-MCNC: 3.7 G/DL (ref 3.4–5)
ALP SERPL-CCNC: 62 U/L (ref 33–136)
ALT SERPL W P-5'-P-CCNC: 14 U/L (ref 10–52)
ANION GAP SERPL CALC-SCNC: 11 MMOL/L (ref 10–20)
AST SERPL W P-5'-P-CCNC: 18 U/L (ref 9–39)
BILIRUB SERPL-MCNC: 0.4 MG/DL (ref 0–1.2)
BNP SERPL-MCNC: 138 PG/ML (ref 0–99)
BUN SERPL-MCNC: 13 MG/DL (ref 6–23)
CALCIUM SERPL-MCNC: 8.9 MG/DL (ref 8.6–10.3)
CHLORIDE SERPL-SCNC: 100 MMOL/L (ref 98–107)
CHOLEST SERPL-MCNC: 143 MG/DL (ref 0–199)
CHOLESTEROL/HDL RATIO: 2.2
CO2 SERPL-SCNC: 32 MMOL/L (ref 21–32)
CREAT SERPL-MCNC: 0.95 MG/DL (ref 0.5–1.3)
EGFRCR SERPLBLD CKD-EPI 2021: 79 ML/MIN/1.73M*2
GLUCOSE SERPL-MCNC: 73 MG/DL (ref 74–99)
HDLC SERPL-MCNC: 64.1 MG/DL
LDLC SERPL CALC-MCNC: 50 MG/DL
NON HDL CHOLESTEROL: 79 MG/DL (ref 0–149)
POTASSIUM SERPL-SCNC: 4.4 MMOL/L (ref 3.5–5.3)
PROT SERPL-MCNC: 7.1 G/DL (ref 6.4–8.2)
SODIUM SERPL-SCNC: 139 MMOL/L (ref 136–145)
TRIGL SERPL-MCNC: 147 MG/DL (ref 0–149)
TSH SERPL-ACNC: 2.29 MIU/L (ref 0.44–3.98)
VLDL: 29 MG/DL (ref 0–40)

## 2024-02-01 PROCEDURE — 36415 COLL VENOUS BLD VENIPUNCTURE: CPT

## 2024-02-01 PROCEDURE — 84443 ASSAY THYROID STIM HORMONE: CPT

## 2024-02-01 PROCEDURE — 71046 X-RAY EXAM CHEST 2 VIEWS: CPT

## 2024-02-01 PROCEDURE — 83880 ASSAY OF NATRIURETIC PEPTIDE: CPT

## 2024-02-01 PROCEDURE — 80061 LIPID PANEL: CPT

## 2024-02-01 PROCEDURE — 71046 X-RAY EXAM CHEST 2 VIEWS: CPT | Performed by: RADIOLOGY

## 2024-02-01 PROCEDURE — 80053 COMPREHEN METABOLIC PANEL: CPT

## 2024-02-05 ENCOUNTER — TREATMENT (OUTPATIENT)
Dept: PHYSICAL THERAPY | Facility: CLINIC | Age: 84
End: 2024-02-05
Payer: MEDICARE

## 2024-02-05 DIAGNOSIS — R26.89 BALANCE DISORDER: ICD-10-CM

## 2024-02-05 DIAGNOSIS — M25.572 LEFT ANKLE PAIN, UNSPECIFIED CHRONICITY: Primary | ICD-10-CM

## 2024-02-05 PROCEDURE — 97110 THERAPEUTIC EXERCISES: CPT | Mod: GP,CQ | Performed by: PHYSICAL THERAPY ASSISTANT

## 2024-02-05 PROCEDURE — 97140 MANUAL THERAPY 1/> REGIONS: CPT | Mod: GP,CQ | Performed by: PHYSICAL THERAPY ASSISTANT

## 2024-02-05 NOTE — PROGRESS NOTES
"Physical Therapy    Physical Therapy Treatment    Patient Name: Greg Comer  MRN: [id number]  Today's Date: 2/5/2024  1-145p  Visit 2         Assessment:  Struggles w/ seated PF , banded PF and heel raises on Shuttle falling into supination.   Plan:  Seated wt. rocker board/BAPS.   OP PT Plan  Treatment/Interventions: Cryotherapy, Dry needling, Education/ Instruction, Electrical stimulation, Gait training, Hot pack, Manual therapy, Therapeutic activities, Therapeutic exercises  PT Plan: Skilled PT  PT Frequency: 1 time per week  Duration: 8 weeks  Onset Date: 01/01/22  Certification Period Start Date: 01/05/24  Certification Period End Date: 04/04/24  Number of Treatments Authorized: Med Nec  Rehab Potential: Good  Plan of Care Agreement: Patient    Current Problem  1. Left ankle pain, unspecified chronicity        2. Balance disorder          Subjective    Compliant w/ HEP. Had a bladder procedure 5 days ago and surgeon instructions were to \"take it easy for ten days. \"        Pain   none    Objective   Weak gastrocs B        Treatments:  Therapeutic Exercise  Therapeutic Exercise Performed: Yes  Therapeutic Exercise Activity 1: nustep x 10'  Therapeutic Exercise Activity 2: seated DF/PF x 20  Therapeutic Exercise Activity 3: 4 -way TB ankle strengthening GTB x 10 ea direction  Therapeutic Exercise Activity 4: Shuttle 2B LP x 40 , heel riases 1B x 10  Therapeutic Exercise Activity 5: Leg Press heel raises 45# x 20  Therapeutic Exercise Activity 6: Great toe flexion x 20 R/L    Manual Therapy  Manual Therapy Performed: Yes  Manual Therapy Activity 4: PROM all toes and midfoot R/L    OP EDUCATION:       Goals:  Active       PT Problem       Patient will report compliance with his home exercise program.        Start:  01/05/24    Expected End:  04/04/24            Patient will report improvement through the LEFS to show improved activity tolerance.        Start:  01/05/24    Expected End:  04/04/24            Patient " will report improvement in bilateral LE strength to 5/5 in all motions to facilitate weight bearing activity.        Start:  01/05/24    Expected End:  04/04/24

## 2024-02-13 ENCOUNTER — OFFICE VISIT (OUTPATIENT)
Dept: PRIMARY CARE | Facility: CLINIC | Age: 84
End: 2024-02-13
Payer: MEDICARE

## 2024-02-13 VITALS
BODY MASS INDEX: 23.59 KG/M2 | DIASTOLIC BLOOD PRESSURE: 63 MMHG | HEART RATE: 66 BPM | TEMPERATURE: 98.1 F | WEIGHT: 178 LBS | HEIGHT: 73 IN | OXYGEN SATURATION: 96 % | SYSTOLIC BLOOD PRESSURE: 123 MMHG

## 2024-02-13 DIAGNOSIS — E11.65 CONTROLLED TYPE 2 DIABETES MELLITUS WITH HYPERGLYCEMIA, WITHOUT LONG-TERM CURRENT USE OF INSULIN (MULTI): ICD-10-CM

## 2024-02-13 DIAGNOSIS — D64.9 ANEMIA, UNSPECIFIED TYPE: Primary | ICD-10-CM

## 2024-02-13 DIAGNOSIS — R39.11 BENIGN PROSTATIC HYPERPLASIA WITH URINARY HESITANCY: ICD-10-CM

## 2024-02-13 DIAGNOSIS — N40.1 BENIGN PROSTATIC HYPERPLASIA WITH URINARY HESITANCY: ICD-10-CM

## 2024-02-13 DIAGNOSIS — I50.1 HEART FAILURE, LEFT, WITH LVEF <=30% (MULTI): ICD-10-CM

## 2024-02-13 PROBLEM — Z95.810 HISTORY OF CARDIAC DEFIBRILLATOR PLACEMENT: Status: ACTIVE | Noted: 2024-02-13

## 2024-02-13 PROBLEM — I45.10 RIGHT BUNDLE BRANCH BLOCK: Status: ACTIVE | Noted: 2024-02-13

## 2024-02-13 PROBLEM — N40.0 BPH (BENIGN PROSTATIC HYPERPLASIA): Status: ACTIVE | Noted: 2023-09-29

## 2024-02-13 PROBLEM — E78.5 HYPERLIPEMIA: Status: ACTIVE | Noted: 2024-02-13

## 2024-02-13 PROBLEM — E88.819 INSULIN RESISTANCE: Status: ACTIVE | Noted: 2024-02-13

## 2024-02-13 PROCEDURE — 99214 OFFICE O/P EST MOD 30 MIN: CPT | Performed by: INTERNAL MEDICINE

## 2024-02-13 PROCEDURE — 1160F RVW MEDS BY RX/DR IN RCRD: CPT | Performed by: INTERNAL MEDICINE

## 2024-02-13 PROCEDURE — 1036F TOBACCO NON-USER: CPT | Performed by: INTERNAL MEDICINE

## 2024-02-13 PROCEDURE — 1159F MED LIST DOCD IN RCRD: CPT | Performed by: INTERNAL MEDICINE

## 2024-02-13 PROCEDURE — 1126F AMNT PAIN NOTED NONE PRSNT: CPT | Performed by: INTERNAL MEDICINE

## 2024-02-13 ASSESSMENT — PATIENT HEALTH QUESTIONNAIRE - PHQ9
SUM OF ALL RESPONSES TO PHQ9 QUESTIONS 1 AND 2: 0
2. FEELING DOWN, DEPRESSED OR HOPELESS: NOT AT ALL
1. LITTLE INTEREST OR PLEASURE IN DOING THINGS: NOT AT ALL

## 2024-02-13 ASSESSMENT — ENCOUNTER SYMPTOMS
DEPRESSION: 0
LOSS OF SENSATION IN FEET: 0
OCCASIONAL FEELINGS OF UNSTEADINESS: 1

## 2024-02-13 NOTE — PATIENT INSTRUCTIONS
Have complete blood count now (or with next routine blood work)    Schedule RSV vaccine     Dr. Calvin Raymundo - [facility] ([address]  [address]  [address]  [place] [address] [phone]

## 2024-02-13 NOTE — PROGRESS NOTES
"Chief Complaint   Patient presents with    [facility]         History Of Present Illness:    Greg Comer \"Timothy\" is a 83 y.o. male presenting to [facility] care.  Timtohy has hx of CAD sp PC to LAD (10/21), severe aortic stenosis sp AVR followed by TAVR, HFrEF sp ICD placement, paroxysmal Afib, DM, HTN , HLD and BPH.  Last echo 2/6/23 demonstrated EF 30-35% with global hypokinesis.  Currently asymptomatic.  Hx of BPH sp TURP.  Recent cysto showed patent channel.  Urinary symptoms controlled.  Recently started dupixent for exzema and itch is much better.  Has history of normocytic anemia that corrected on labs 6/15/23.  A1C 6.0 on 9/27/23.  Has spinal cord stimulator - for back and leg pain, no longer necessary   Walking, weights daily  Good energy and good strength  UTD with eye exam   Colonoscopy has always been normal         Active Medical Problems:  Patient Active Problem List    Diagnosis Date Noted    Controlled type 2 diabetes mellitus with hyperglycemia, without long-term current use of insulin (CMS/[facility]) 02/14/2024    History of cardiac defibrillator placement 02/13/2024    Hyperlipemia 02/13/2024    Insulin resistance 02/13/2024    Right bundle branch block 02/13/2024    A-fib (CMS/[facility]) 09/29/2023    Anemia 09/29/2023    Anxiety 09/29/2023    Balance disorder 09/29/2023    BPH (benign prostatic hyperplasia) 09/29/2023    Bilateral presbyopia 09/29/2023    CAD in native artery 09/29/2023    Cardiomyopathy (CMS/[facility]) 09/29/2023    Constipation 09/29/2023    Deviated nasal septum 09/29/2023    Gait instability 09/29/2023    GERD (gastroesophageal reflux disease) 09/29/2023    H/O acute myocardial infarction 09/29/2023    H/O prostate cancer 09/29/2023    Heart failure, left, with LVEF <=30% (CMS/[facility]) 09/29/2023    Hypoxia 09/29/2023    Intermediate stage nonexudative age-related macular degeneration of both eyes 09/29/2023    Pseudophakia 09/29/2023    Pulmonary hypertension (CMS/[facility]) 09/29/2023    Pulmonary " hypertension due to lung diseases and hypoxia (CMS/[facility]) 09/29/2023    RBBB (right bundle branch block with left anterior fascicular block) 09/29/2023    Rhinorrhea 09/29/2023    S/P coronary artery stent placement 09/29/2023    S/P TAVR (transcatheter aortic valve replacement) 09/29/2023    Throat clearing 09/29/2023    Urinary incontinence 09/29/2023    Recurrent UTI 09/29/2023    UTI symptoms 09/29/2023    Weakness 09/29/2023       Past Medical History:  Past Medical History:   Diagnosis Date    Aortic stenosis     valve replacement followed by TAVR    Atrial fibrillation (CMS/[facility])     amoidarone and DOAC    BPH (benign prostatic hyperplasia)     CAD (coronary artery disease)     SAMM to LAD    Congestive heart failure (CHF) (CMS/[facility])     Stage C    Eczema     recently started dupixent    History of cardiac defibrillator placement     Hyperlipemia     Insulin resistance     Personal history of other diseases of the circulatory system 02/16/2022    History of aortic valve stenosis    Recurrent UTI     Right bundle branch block        Past Surgical History:  Past Surgical History:   Procedure Laterality Date    AORTIC VALVE REPLACEMENT      bovine AVR then TAVR    CT HEAD ANGIO W AND WO IV CONTRAST  10/20/2021    CT HEAD ANGIO W AND WO IV CONTRAST 10/20/2021 [facility]    SKIN CANCER EXCISION      SPINAL CORD STIMULATOR IMPLANT      TRANSURETHRAL RESECTION OF PROSTATE           Social History:  Social History     Tobacco Use    Smoking status: Never    Smokeless tobacco: Never   Vaping Use    Vaping Use: Never used   Substance Use Topics    Alcohol use: Not Currently     Alcohol/week: 7.0 standard drinks of alcohol     Types: 7 Standard drinks or equivalent per week     Comment: one drink per day - only one         Family History:  Family History   Problem Relation Name Age of Onset    Coronary artery disease Mother      Coronary artery disease Father      No Known Problems Sister      No Known Problems  Sister      No Known Problems Brother          Retired OB/Gyn - still teaches at [employer]    No Known Problems Daughter      No Known Problems Daughter          lives in group home - working every day        Allergies:  Patient has no known allergies.    Outpatient Medications:    Current Outpatient Medications:     albuterol (ProAir HFA) 90 mcg/actuation inhaler, Inhale 2 puffs every 4 hours if needed for wheezing., Disp: , Rfl:     amiodarone (Pacerone) 200 mg tablet, TAKE 1 TABLET DAILY, Disp: 90 tablet, Rfl: 1    atorvastatin (Lipitor) 40 mg tablet, Take 1 tablet (40 mg) by mouth once daily., Disp: , Rfl:     azelastine (Astelin) 137 mcg (0.1 %) nasal spray, USE 2 SPRAYS INTO EACH NOSTRIL TWICE A DAY, Disp: 90 mL, Rfl: 1    carvedilol (Coreg) 3.125 mg tablet, Take 1 tablet (3.125 mg) by mouth 2 times a day with meals., Disp: 180 tablet, Rfl: 0    clopidogrel (Plavix) 75 mg tablet, Take 1 tablet (75 mg) by mouth once daily., Disp: 90 tablet, Rfl: 1    docusate sodium (Colace) 100 mg capsule, Take 1 capsule (100 mg) by mouth twice a day., Disp: , Rfl:     dupilumab (Dupixent) 300 mg/2 mL injection, Inject 2 mL (300 mg) under the skin 1 time., Disp: , Rfl:     Eliquis 5 mg tablet, Take 1 tablet (5 mg) by mouth every 12 hours., Disp: , Rfl:     Entresto 49-51 mg tablet, , Disp: , Rfl:     fish oil concentrate (Omega-3) 120-180 mg capsule, Take 1 capsule (1 g) by mouth once daily., Disp: , Rfl:     fluocinolone (Derma-Smoothe) 0.01 % external oil, Apply topically 3 times a day., Disp: , Rfl:     Jardiance 10 mg, TAKE 1 TABLET BY MOUTH EVERY DAY, Disp: 90 tablet, Rfl: 3    melatonin 3 mg capsule, Take 3 mg by mouth once daily at bedtime., Disp: , Rfl:     multivitamin tablet, Take 1 tablet by mouth once daily., Disp: , Rfl:     Myrbetriq 50 mg tablet extended release 24 hr 24 hr tablet, Take 1 tablet (50 mg) by mouth once daily., Disp: 90 tablet, Rfl: 3    nitroglycerin (Nitrostat) 0.4 mg SL tablet, Place 1 tablet (0.4 mg)  "under the tongue every 5 minutes if needed. UP TO 3 DOSES AS NEEDED FOR CHEST PAIN., Disp: , Rfl:     spironolactone (Aldactone) 25 mg tablet, Take 1 tablet (25 mg) by mouth once daily., Disp: , Rfl:     tacrolimus (Protopic) 0.1 % ointment, Apply 1 Application topically 2 times a day., Disp: , Rfl:     traZODone (Desyrel) 100 mg tablet, Take 1 tablet (100 mg) by mouth once daily at bedtime., Disp: , Rfl:     triamcinolone (Kenalog) 0.1 % cream, Apply 1 Application topically., Disp: , Rfl:     vit C/E/Zn/coppr/lutein/zeaxan (PRESERVISION AREDS-2 ORAL), Take 2 capsules by mouth once daily., Disp: , Rfl:     furosemide (Lasix) 20 mg tablet, Take 1 tablet (20 mg) by mouth once daily as needed., Disp: , Rfl:     ipratropium (Atrovent) 42 mcg (0.06 %) nasal spray, USE 2 SPRAYS IN EACH NOSTRIL 3-4 TIMES DAILY., Disp: 15 mL, Rfl: 11    sildenafil (Viagra) 100 mg tablet, Take 1 tablet (100 mg) by mouth if needed., Disp: , Rfl:     Review of Systems:  Pertinent positives in review of systems outlined above.  Complete ROS otherwise negative.           Last Recorded Vitals:  Vitals:    02/13/24 1042   BP: 123/63   BP Location: Left arm   Patient Position: Sitting   BP Cuff Size: Adult long   Pulse: 66   Temp: 36.7 °C (98.1 °F)   SpO2: 96%   Weight: 80.7 kg (178 lb)   Height: 1.854 m (6' 1\")   Body mass index is 23.48 kg/m².        Physical Exam  Constitutional:       Appearance: Normal appearance.   HENT:      Mouth/Throat:      Pharynx: Oropharynx is clear.   Eyes:      Extraocular Movements: Extraocular movements intact.      Conjunctiva/sclera: Conjunctivae normal.      Pupils: Pupils are equal, round, and reactive to light.   Neck:      Vascular: No carotid bruit.   Cardiovascular:      Rate and Rhythm: Normal rate. Rhythm irregular.      Pulses: Normal pulses.      Heart sounds: Normal heart sounds.   Pulmonary:      Effort: Pulmonary effort is normal.      Breath sounds: Normal breath sounds.   Musculoskeletal:      " Right lower leg: No edema.      Left lower leg: No edema.   Lymphadenopathy:      Cervical: No cervical adenopathy.        Assessment/Plan   Problem List Items Addressed This Visit       Anemia - Primary     Hemoglobin and hematocrit normalized on blood work in June.  Will repeat CBC now.  Colonoscopy UTD         Relevant Orders    CBC and Auto Differential    BPH (benign prostatic hyperplasia)     Symptoms improved after TURP.  Recent cysto unremarkable.  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  Asymptomatic.  On GDMT per cardiology.  ICD in place          Controlled type 2 diabetes mellitus with hyperglycemia, without long-term current use of insulin (CMS/[facility])     Last A1C 6.0.  Will repeat with next labs. Exercising and eating a plant based diet.  Encouraged to continue with healthy lifestyle changes.          Relevant Orders    Hemoglobin A1C             Charles Bradford MD

## 2024-02-14 PROBLEM — I50.33 ACUTE ON CHRONIC HEART FAILURE WITH PRESERVED EJECTION FRACTION (MULTI): Status: RESOLVED | Noted: 2024-02-14 | Resolved: 2024-02-14

## 2024-02-14 PROBLEM — I50.33 ACUTE ON CHRONIC HEART FAILURE WITH PRESERVED EJECTION FRACTION (MULTI): Status: ACTIVE | Noted: 2024-02-14

## 2024-02-14 PROBLEM — E46 MALNUTRITION (MULTI): Status: RESOLVED | Noted: 2023-09-29 | Resolved: 2024-02-14

## 2024-02-14 PROBLEM — E11.65 CONTROLLED TYPE 2 DIABETES MELLITUS WITH HYPERGLYCEMIA, WITHOUT LONG-TERM CURRENT USE OF INSULIN (MULTI): Status: ACTIVE | Noted: 2024-02-14

## 2024-02-15 NOTE — ASSESSMENT & PLAN NOTE
Hemoglobin and hematocrit normalized on blood work in June.  Will repeat CBC now.  Colonoscopy UTD

## 2024-02-15 NOTE — ASSESSMENT & PLAN NOTE
Last A1C 6.0.  Will repeat with next labs. Exercising and eating a plant based diet.  Encouraged to continue with healthy lifestyle changes.

## 2024-02-19 ENCOUNTER — OFFICE VISIT (OUTPATIENT)
Dept: NEUROLOGY | Facility: CLINIC | Age: 84
End: 2024-02-19
Payer: MEDICARE

## 2024-02-19 ENCOUNTER — HOSPITAL ENCOUNTER (OUTPATIENT)
Dept: CARDIOLOGY | Facility: CLINIC | Age: 84
Discharge: HOME | End: 2024-02-19
Payer: MEDICARE

## 2024-02-19 DIAGNOSIS — Z95.810 PRESENCE OF AUTOMATIC CARDIOVERTER/DEFIBRILLATOR (AICD): ICD-10-CM

## 2024-02-19 DIAGNOSIS — R53.1 RIGHT SIDED WEAKNESS: ICD-10-CM

## 2024-02-19 DIAGNOSIS — R20.0 NUMBNESS AND TINGLING OF LEFT LEG: ICD-10-CM

## 2024-02-19 DIAGNOSIS — E11.42 DIABETIC POLYNEUROPATHY ASSOCIATED WITH TYPE 2 DIABETES MELLITUS (MULTI): ICD-10-CM

## 2024-02-19 DIAGNOSIS — R26.81 GAIT INSTABILITY: Primary | ICD-10-CM

## 2024-02-19 DIAGNOSIS — R20.2 NUMBNESS AND TINGLING OF LEFT LEG: ICD-10-CM

## 2024-02-19 DIAGNOSIS — I42.9 CARDIOMYOPATHY, UNSPECIFIED TYPE (MULTI): ICD-10-CM

## 2024-02-19 PROCEDURE — 1036F TOBACCO NON-USER: CPT | Performed by: STUDENT IN AN ORGANIZED HEALTH CARE EDUCATION/TRAINING PROGRAM

## 2024-02-19 PROCEDURE — 99205 OFFICE O/P NEW HI 60 MIN: CPT | Performed by: STUDENT IN AN ORGANIZED HEALTH CARE EDUCATION/TRAINING PROGRAM

## 2024-02-19 PROCEDURE — 99215 OFFICE O/P EST HI 40 MIN: CPT | Mod: GC | Performed by: STUDENT IN AN ORGANIZED HEALTH CARE EDUCATION/TRAINING PROGRAM

## 2024-02-19 PROCEDURE — 93296 REM INTERROG EVL PM/IDS: CPT

## 2024-02-19 PROCEDURE — 93295 DEV INTERROG REMOTE 1/2/MLT: CPT | Performed by: INTERNAL MEDICINE

## 2024-02-19 PROCEDURE — 1159F MED LIST DOCD IN RCRD: CPT | Performed by: STUDENT IN AN ORGANIZED HEALTH CARE EDUCATION/TRAINING PROGRAM

## 2024-02-19 PROCEDURE — 1126F AMNT PAIN NOTED NONE PRSNT: CPT | Performed by: STUDENT IN AN ORGANIZED HEALTH CARE EDUCATION/TRAINING PROGRAM

## 2024-02-19 PROCEDURE — 1160F RVW MEDS BY RX/DR IN RCRD: CPT | Performed by: STUDENT IN AN ORGANIZED HEALTH CARE EDUCATION/TRAINING PROGRAM

## 2024-02-19 NOTE — PATIENT INSTRUCTIONS
Please get your blood drawn    Schedule your MRI Call [phone] to schedule your imaging.     Call [phone] to schedule your EMG test.

## 2024-02-19 NOTE — PROGRESS NOTES
Neuromuscular Medicine Consult     Greg Comer, MRN: [id number], : 1940  Reason for Referral: Left ankle numbness  Referring Provider: Jorge Moffett MD *  Primary Care Physician: No primary care provider on file.     Impression/Plan:   Greg Comer presents with a complex history.  He recalls developing left ankle numbness immediately after a TAVR procedure in .  He denied any other part of the body being involved and this is slowly improved over time.  The acute onset and temporal proximity to a TAVR procedure raises concern for a cerebrovascular event.  However on further discussion it is apparent he has had a longstanding gait disorder and balance problems for years.  He also has urinary urgency being evaluated by urology.      On exam he notably has asymmetric weakness, sensory symptoms, and reflexes.  Left upper extremity is slightly weak proximally, in the right lower extremity he has marked right plantarflexion weakness and low tone at the ankle as compared to the remaining limbs which demonstrated increased tone and/or paratonia.  Right thumb rest tremor was briefly noted.  He is hyperreflexic in the right leg when compared to the left.  He has a patchy sensory deficit in the left leg which would be typical of a lumbar radiculopathy.  Notably his extraocular movements are restricted especially horizontally and with downgaze, along with choppy horizontal pursuit, however he does not complain of double vision.  He had a CT of the head which reveals diffuse atrophy and small vessel disease.  Although noted to have ventriculomegaly, this likely represents ex vacuo dilatation given the degree of white matter disease.    He likely has more than one issue.  His acute onset of symptoms after his TAVR procedure raise concern for a cerebral infarct periprocedurally, however other possibilities include femoral neuropathy from groin puncture and/or a coincidental lumbar radiculopathy.  The patchy  sensory changes in the left leg also support a left lumbar radiculopathy.  This however does not explain the weakness in the right ankle plantarflexion with hyperreflexia in the same limb, pointing to a central etiology also supporting a cerebrovascular event.  Lastly the extraocular restriction, increased tone, wide-based gait, momentary right thumb rest tremor or point to a CNS etiology possibly vascular PD and/or cerebellar pathology.  This likely does not represent NPH. In addition he has length dependant sensory symptoms in both legs and impaired proprioception bilaterally with a wide-based gait which may represent a peripheral polyneuropathy.    He will require an extensive workup which we will do stepwise starting with an MRI of the brain to assess for infarcts and better characterize degenerative changes.  An EMG of the left upper, lower extremity will be highest yield processing peripheral neuropathy and the presence of a lumbosacral radiculopathy.  Lastly will send the first tier of neuropathy labs.    Plan:  Problem List Items Addressed This Visit       Gait instability - wide based gait  Right sided weakness - right plantar flexion with right hyperreflexia   Restricted EOM range and hypometric saccades    Relevant Orders    MR brain wo IV contrast    Numbness and tingling of left leg  Bilateral loss of proprioception    Relevant Orders    EMG & nerve conduction to assess for left peripheral neuropathy vs lumbosacral radiculopathy    Methylmalonic Acid    Serum Protein Electrophoresis + Immunofixation    Brinnon/Lambda Free Light Chain, Serum    Hemoglobin A1C     Maciel Mancia MD  Neuromuscular Fellow       History of Present Illness:    Mr. Comer is a 83 y.o. left handed male who presents for evaluation of left leg numbness.    He had a TAVR (left femoral access) on Feb 2022 and immediately noted left ankle numbness.  He did not notice any other symptoms with this, and it has remained relatively  stable over time and may have improved slightly.    In addition he has back pain, but denied radicular shooting pains.  He does not have perceivable sensory symptoms in the rest of his legs.  He notes that he had a spinal cord stimulator placed however he never needed to use it because his symptoms improved on their own.    He has a lot of difficulty walking, which has been going on for many years. He has trouble walking due to shortness of breath. Prior to TAVR he was walking with a cane.  He has issues with balance.    He has some urinary incontinence around 4 years ago, now seeing a urologist but no clear reason. He has feeling but no control of the urine flow.     He denied bulbar symptoms, no visual symptoms, no trouble swallowing, no difficulty speaking.  He thinks his cognition is normal.    Past medical hsitory revieewed: CAD, MI in 2014 s/p stents, PEA arrest in 11/2020, Afib on eliquis, NICM w/ HFrEF 30-35% s/p TAVR (2/2022), HTN, HLD, T2DM with A1c <6.6, CAD, DVT, Spinal canal stenosis s.p laminectomy and neural stimulator placement (now dormant)     Relevant past medical, surgical, family, and social histories, along with ROS was reviewed and pertinent details noted above.     Past Medical History:   Diagnosis Date    Aortic stenosis     valve replacement followed by TAVR    Atrial fibrillation (CMS/HCC)     amoidarone and DOAC    BPH (benign prostatic hyperplasia)     CAD (coronary artery disease)     SAMM to LAD    Congestive heart failure (CHF) (CMS/HCC)     Stage C    Eczema     recently started dupixent    History of cardiac defibrillator placement     Hyperlipemia     Insulin resistance     Personal history of other diseases of the circulatory system 02/16/2022    History of aortic valve stenosis    Recurrent UTI     Right bundle branch block      Past Surgical History:   Procedure Laterality Date    AORTIC VALVE REPLACEMENT      bovine AVR then TAVR    CT HEAD ANGIO W AND WO IV CONTRAST  10/20/2021     CT HEAD ANGIO W AND WO IV CONTRAST [facility]20/2021 [facility]    SKIN CANCER EXCISION      SPINAL CORD STIMULATOR IMPLANT      TRANSURETHRAL RESECTION OF PROSTATE       Family History   Problem Relation Name Age of Onset    Coronary artery disease Mother      Coronary artery disease Father      No Known Problems Sister      No Known Problems Sister      No Known Problems Brother          Retired OB/Gyn - still teaches at [employer]    No Known Problems Daughter      No Known Problems Daughter          lives in group home - working every day     Social History     Tobacco Use    Smoking status: Never    Smokeless tobacco: Never   Substance Use Topics    Alcohol use: Not Currently     Alcohol/week: 7.0 standard drinks of alcohol     Types: 7 Standard drinks or equivalent per week     Comment: one drink per day - only one      No Known Allergies     Medications:    Current Outpatient Medications:     albuterol (ProAir HFA) 90 mcg/actuation inhaler, Inhale 2 puffs every 4 hours if needed for wheezing., Disp: , Rfl:     amiodarone (Pacerone) 200 mg tablet, TAKE 1 TABLET DAILY, Disp: 90 tablet, Rfl: 1    atorvastatin (Lipitor) 40 mg tablet, Take 1 tablet (40 mg) by mouth once daily., Disp: , Rfl:     azelastine (Astelin) 137 mcg (0.1 %) nasal spray, USE 2 SPRAYS INTO EACH NOSTRIL TWICE A DAY, Disp: 90 mL, Rfl: 1    carvedilol (Coreg) 3.125 mg tablet, Take 1 tablet (3.125 mg) by mouth 2 times a day with meals., Disp: 180 tablet, Rfl: 0    clopidogrel (Plavix) 75 mg tablet, Take 1 tablet (75 mg) by mouth once daily., Disp: 90 tablet, Rfl: 1    docusate sodium (Colace) 100 mg capsule, Take 1 capsule (100 mg) by mouth twice a day., Disp: , Rfl:     dupilumab (Dupixent) 300 mg/2 mL injection, Inject 2 mL (300 mg) under the skin 1 time., Disp: , Rfl:     Eliquis 5 mg tablet, Take 1 tablet (5 mg) by mouth every 12 hours., Disp: , Rfl:     Entresto 49-51 mg tablet, , Disp: , Rfl:     fish oil concentrate (Omega-3) 120-180 mg  capsule, Take 1 capsule (1 g) by mouth once daily., Disp: , Rfl:     fluocinolone (Derma-Smoothe) 0.01 % external oil, Apply topically 3 times a day., Disp: , Rfl:     furosemide (Lasix) 20 mg tablet, Take 1 tablet (20 mg) by mouth once daily as needed., Disp: , Rfl:     ipratropium (Atrovent) 42 mcg (0.06 %) nasal spray, USE 2 SPRAYS IN EACH NOSTRIL 3-4 TIMES DAILY., Disp: 15 mL, Rfl: 11    Jardiance 10 mg, TAKE 1 TABLET BY MOUTH EVERY DAY, Disp: 90 tablet, Rfl: 3    melatonin 3 mg capsule, Take 3 mg by mouth once daily at bedtime., Disp: , Rfl:     multivitamin tablet, Take 1 tablet by mouth once daily., Disp: , Rfl:     Myrbetriq 50 mg tablet extended release 24 hr 24 hr tablet, Take 1 tablet (50 mg) by mouth once daily., Disp: 90 tablet, Rfl: 3    nitroglycerin (Nitrostat) 0.4 mg SL tablet, Place 1 tablet (0.4 mg) under the tongue every 5 minutes if needed. UP TO 3 DOSES AS NEEDED FOR CHEST PAIN., Disp: , Rfl:     sildenafil (Viagra) 100 mg tablet, Take 1 tablet (100 mg) by mouth if needed., Disp: , Rfl:     spironolactone (Aldactone) 25 mg tablet, Take 1 tablet (25 mg) by mouth once daily., Disp: , Rfl:     tacrolimus (Protopic) 0.1 % ointment, Apply 1 Application topically 2 times a day., Disp: , Rfl:     traZODone (Desyrel) 100 mg tablet, Take 1 tablet (100 mg) by mouth once daily at bedtime., Disp: , Rfl:     triamcinolone (Kenalog) 0.1 % cream, Apply 1 Application topically., Disp: , Rfl:     vit C/E/Zn/coppr/lutein/zeaxan (PRESERVISION AREDS-2 ORAL), Take 2 capsules by mouth once daily., Disp: , Rfl:        Physical Exam:   There were no vitals taken for this visit.     General Appearance:  No distress, alert, interactive and cooperative.   Skin: Diffuse ecchymoses and bruises throughout the bilateral upper and lower extremities  Musculoskeletal:  No pes cavus, or hammertoes  Neurological:  Mental status: the patient provided an accurate history, language was normal.   Cranial Nerves:  CN 2   Visual fields  full to confrontation.   CN 3, 4, 6   Pupils round, 3 mm in diameter, equally reactive to light.   Lids symmetric; no ptosis.   EOMs abnormal alignment with bilateral esotropia, reduced range of motion especially with bilateral abduction and with downgaze, there is choppy pursuit.  Saccades are hypometric bilaterally horizontally.   No nystagmus.   CN 5   Facial sensation intact bilaterally.   CN 7   Normal and symmetric facial strength. Nasolabial folds symmetric.   Able to lift eyebrows and close eye lids with eye lashes buried symmetrically.   CN 8   Hearing intact to conversation.     CN 9, 10   Palate elevates symmetrically.   Phonation within normal limits, no dysarthria.   CN 11   Normal strength of shoulder shrug and neck turning.   CN 12   Tongue midline, with normal bulk and strength; no fasciculations.     Motor:   Muscle bulk: Normal throughout.  Muscle tone: Increased in both upper and lower extremities.  Paratonia noted the right ankle has low tone.  Movements: There is a brief moment of rest tremor in the right thumb.  No fasciculations, or other abnormal movement.     Manual Muscle Testing (MMT) reveals the following MRC grades:  Neck Flexion 5  Neck Extension 5  R L   Shoulder abduction  5 4  Elbow flexion   5 5-  Elbow extension  5 5  Finger flexion   5 5  Finger extension  5 5  Finger abduction  5 5  Thumb abduction   5 5  Hip flexion   5- 4  Hip extension   5 5  Hip abduction    5 5  Hip adduction    5 5  Knee flexion   5 5  Knee extension  5 5  Ankle dorsiflexion  4 4  Ankle plantarflexion  2 5-  Ankle Inversion   5- 5-  Ankle Eversion   5- 5-  Big toe extension  4 4  Toe flexion   4 4    Reflexes:     R          L  BR:               1          1  Biceps:         1          1  Knee:           3          1  Ankle:          0          0    Babinski: Toes are down going  Todd's: Not present  No clonus or other pathological reflexes    Sensory:   I sensation is normal in the upper  extremities.  Left lower extremity there is reduced sensation to pinprick and light touch in patchy distribution of the lateral left calf medial lateral left ankle and dorsum of the left foot.  Likely is bilateral length dependent reduction in sensation.  Reduced vibration at the bilateral ankles compared to the knee, he does not feel vibration at all in the left ankle, but faintly at the left knee.  Position sense is completely impaired bilaterally.    Coordination:    In both upper extremities, finger-nose-finger was intact without dysmetria or overshoot.   NERY were intact but very slow and uncoordinated  Finger tapping slightly reduced bilaterally    Gait:   Wide-based gait using a cane, right foot is in a hyperdorsiflexed position and lurches forward with each step.  He stumbles 1 point and walks slowly, there is no shuffling or magnetic quality.       Results:     The following labs, imaging, and/or data were personally reviewed and demonstrated:    CT head wo Sep 2023: Diffuse volume loss, there is ventricular prominence likely hydrocephalus ex vacuo, multiple areas of hypodensity bilaterally in the frontal and parietal white matter likely representing small vessel ischemic disease.  There is likely a left anterior limb of internal capsule lacunae.    ntains abnormal data Comprehensive Metabolic Panel  Order: [id number]            Component  Ref Range & Units 2 wk ago  (2/1/24) 4 mo ago  (9/27/23) 8 mo ago  (6/15/23) 1 yr ago  (2/6/23) 1 yr ago  (4/28/22) 1 yr ago  (3/2/22) 1 yr ago  (2/23/22)   Glucose  74 - 99 mg/dL 73 Low  95 94 87 96 90 110 High    Sodium  136 - 145 mmol/L 139 143 136 136 136 137 133 Low    Potassium  3.5 - 5.3 mmol/L 4.4 4.8 4.6 4.8 4.5 4.2 4.2   Chloride  98 - 107 mmol/L 100 101 100 95 Low  98 97 Low  95 Low    Bicarbonate  21 - 32 mmol/L 32 32 31 30 30 38 High  34 High    Anion Gap  10 - 20 mmol/L 11 15 10 16 13 6 Low  8 Low    Urea Nitrogen  6 - 23 mg/dL 13 18 17 15 17 16 15    Creatinine  0.50 - 1.30 mg/dL 0.95 0.96 0.91 0.98 0.81 0.78 0.82          Hemoglobin A1C           Component  Ref Range & Units 4 mo ago  (9/27/23) 1 yr ago  (1/6/23) 2 yr ago  (10/17/21) 2 yr ago  (10/4/21) 2 yr ago  (8/14/21) 3 yr ago  (8/27/20)   Hemoglobin A1C  % 6.0 Abnormal  6.0 Abnormal  CM 6.6 Abnormal  CM 6.9 High  R, CM 6.9 High  R, CM 6.6 High  R, CM        Vitamin B12        Component  Ref Range & Units 4 mo ago  (9/27/23) 2 yr ago  (12/6/21) 2 yr ago  (11/9/21)   Vitamin B-12  211 - 911 pg/mL [phone]

## 2024-02-21 ENCOUNTER — APPOINTMENT (OUTPATIENT)
Dept: PHYSICAL THERAPY | Facility: CLINIC | Age: 84
End: 2024-02-21
Payer: MEDICARE

## 2024-02-22 ENCOUNTER — LAB (OUTPATIENT)
Dept: LAB | Facility: LAB | Age: 84
End: 2024-02-22
Payer: MEDICARE

## 2024-02-22 ENCOUNTER — HOSPITAL ENCOUNTER (OUTPATIENT)
Dept: RESPIRATORY THERAPY | Facility: HOSPITAL | Age: 84
Discharge: HOME | End: 2024-02-22
Payer: MEDICARE

## 2024-02-22 DIAGNOSIS — I50.1 HEART FAILURE, LEFT, WITH LVEF <=30% (MULTI): ICD-10-CM

## 2024-02-22 DIAGNOSIS — R20.2 NUMBNESS AND TINGLING OF LEFT LEG: ICD-10-CM

## 2024-02-22 DIAGNOSIS — R20.0 NUMBNESS AND TINGLING OF LEFT LEG: ICD-10-CM

## 2024-02-22 DIAGNOSIS — D64.9 ANEMIA, UNSPECIFIED TYPE: ICD-10-CM

## 2024-02-22 DIAGNOSIS — E11.42 DIABETIC POLYNEUROPATHY ASSOCIATED WITH TYPE 2 DIABETES MELLITUS (MULTI): ICD-10-CM

## 2024-02-22 LAB
BASOPHILS # BLD AUTO: 0.05 X10*3/UL (ref 0–0.1)
BASOPHILS NFR BLD AUTO: 0.4 %
EOSINOPHIL # BLD AUTO: 0.23 X10*3/UL (ref 0–0.4)
EOSINOPHIL NFR BLD AUTO: 2 %
ERYTHROCYTE [DISTWIDTH] IN BLOOD BY AUTOMATED COUNT: 15.8 % (ref 11.5–14.5)
EST. AVERAGE GLUCOSE BLD GHB EST-MCNC: 126 MG/DL
HBA1C MFR BLD: 6 %
HCT VFR BLD AUTO: 44.7 % (ref 41–52)
HGB BLD-MCNC: 13.7 G/DL (ref 13.5–17.5)
IMM GRANULOCYTES # BLD AUTO: 0.09 X10*3/UL (ref 0–0.5)
IMM GRANULOCYTES NFR BLD AUTO: 0.8 % (ref 0–0.9)
LYMPHOCYTES # BLD AUTO: 1.34 X10*3/UL (ref 0.8–3)
LYMPHOCYTES NFR BLD AUTO: 11.5 %
MCH RBC QN AUTO: 26.3 PG (ref 26–34)
MCHC RBC AUTO-ENTMCNC: 30.6 G/DL (ref 32–36)
MCV RBC AUTO: 86 FL (ref 80–100)
MONOCYTES # BLD AUTO: 1.07 X10*3/UL (ref 0.05–0.8)
MONOCYTES NFR BLD AUTO: 9.2 %
NEUTROPHILS # BLD AUTO: 8.91 X10*3/UL (ref 1.6–5.5)
NEUTROPHILS NFR BLD AUTO: 76.1 %
NRBC BLD-RTO: 0 /100 WBCS (ref 0–0)
PLATELET # BLD AUTO: 269 X10*3/UL (ref 150–450)
PROT SERPL-MCNC: 7.6 G/DL (ref 6.4–8.2)
RBC # BLD AUTO: 5.21 X10*6/UL (ref 4.5–5.9)
WBC # BLD AUTO: 11.7 X10*3/UL (ref 4.4–11.3)

## 2024-02-22 PROCEDURE — 83921 ORGANIC ACID SINGLE QUANT: CPT

## 2024-02-22 PROCEDURE — 94010 BREATHING CAPACITY TEST: CPT

## 2024-02-22 PROCEDURE — 94726 PLETHYSMOGRAPHY LUNG VOLUMES: CPT | Performed by: INTERNAL MEDICINE

## 2024-02-22 PROCEDURE — 94729 DIFFUSING CAPACITY: CPT | Performed by: INTERNAL MEDICINE

## 2024-02-22 PROCEDURE — 84155 ASSAY OF PROTEIN SERUM: CPT

## 2024-02-22 PROCEDURE — 85025 COMPLETE CBC W/AUTO DIFF WBC: CPT

## 2024-02-22 PROCEDURE — 36415 COLL VENOUS BLD VENIPUNCTURE: CPT

## 2024-02-22 PROCEDURE — 94726 PLETHYSMOGRAPHY LUNG VOLUMES: CPT

## 2024-02-22 PROCEDURE — 86334 IMMUNOFIX E-PHORESIS SERUM: CPT

## 2024-02-22 PROCEDURE — 86320 SERUM IMMUNOELECTROPHORESIS: CPT | Performed by: STUDENT IN AN ORGANIZED HEALTH CARE EDUCATION/TRAINING PROGRAM

## 2024-02-22 PROCEDURE — 94010 BREATHING CAPACITY TEST: CPT | Performed by: INTERNAL MEDICINE

## 2024-02-22 PROCEDURE — 83036 HEMOGLOBIN GLYCOSYLATED A1C: CPT

## 2024-02-22 PROCEDURE — 83521 IG LIGHT CHAINS FREE EACH: CPT

## 2024-02-22 PROCEDURE — 84165 PROTEIN E-PHORESIS SERUM: CPT

## 2024-02-22 PROCEDURE — 84165 PROTEIN E-PHORESIS SERUM: CPT | Performed by: STUDENT IN AN ORGANIZED HEALTH CARE EDUCATION/TRAINING PROGRAM

## 2024-02-23 ENCOUNTER — TELEPHONE (OUTPATIENT)
Dept: PRIMARY CARE | Facility: HOSPITAL | Age: 84
End: 2024-02-23
Payer: MEDICARE

## 2024-02-23 ENCOUNTER — PATIENT MESSAGE (OUTPATIENT)
Dept: PRIMARY CARE | Facility: CLINIC | Age: 84
End: 2024-02-23
Payer: MEDICARE

## 2024-02-23 DIAGNOSIS — K62.5 BRIGHT RED BLOOD PER RECTUM: ICD-10-CM

## 2024-02-23 DIAGNOSIS — G47.00 INSOMNIA, UNSPECIFIED TYPE: Primary | ICD-10-CM

## 2024-02-23 LAB
KAPPA LC SERPL-MCNC: 6.72 MG/DL (ref 0.33–1.94)
KAPPA LC/LAMBDA SER: 1.73 {RATIO} (ref 0.26–1.65)
LAMBDA LC SERPL-MCNC: 3.89 MG/DL (ref 0.57–2.63)

## 2024-02-26 LAB — METHYLMALONATE SERPL-SCNC: 0.13 UMOL/L (ref 0–0.4)

## 2024-02-26 RX ORDER — TRAZODONE HYDROCHLORIDE 100 MG/1
100 TABLET ORAL NIGHTLY
Qty: 30 TABLET | Refills: 5 | Status: SHIPPED | OUTPATIENT
Start: 2024-02-26

## 2024-02-26 NOTE — PATIENT COMMUNICATION
Having anal bleeding recently.  Has occurred in the past.  Now for 4-5 d having bright red blood after BM.  No pain.  More than 10 years since colonoscopy, and colonoscopy should be repeated now.   Order in chart. Will need coordination of anticoagulation.      Number for central scheduling provided.     [phone]

## 2024-02-28 LAB
ALBUMIN: 3.5 G/DL (ref 3.4–5)
ALPHA 1 GLOBULIN: 0.4 G/DL (ref 0.2–0.6)
ALPHA 2 GLOBULIN: 1.1 G/DL (ref 0.4–1.1)
BETA GLOBULIN: 1.1 G/DL (ref 0.5–1.2)
GAMMA GLOBULIN: 1.5 G/DL (ref 0.5–1.4)
IMMUNOFIXATION COMMENT: ABNORMAL
PATH REVIEW - SERUM IMMUNOFIXATION: ABNORMAL
PATH REVIEW-SERUM PROTEIN ELECTROPHORESIS: ABNORMAL
PROTEIN ELECTROPHORESIS COMMENT: ABNORMAL

## 2024-03-11 ENCOUNTER — APPOINTMENT (OUTPATIENT)
Dept: RADIOLOGY | Facility: HOSPITAL | Age: 84
End: 2024-03-11
Payer: MEDICARE

## 2024-03-11 DIAGNOSIS — Z12.11 COLON CANCER SCREENING: Primary | ICD-10-CM

## 2024-03-11 RX ORDER — POLYETHYLENE GLYCOL 3350, SODIUM SULFATE ANHYDROUS, SODIUM BICARBONATE, SODIUM CHLORIDE, POTASSIUM CHLORIDE 236; 22.74; 6.74; 5.86; 2.97 G/4L; G/4L; G/4L; G/4L; G/4L
4000 POWDER, FOR SOLUTION ORAL ONCE
Qty: 4000 ML | Refills: 0 | Status: SHIPPED | OUTPATIENT
Start: 2024-03-11 | End: 2024-03-11

## 2024-03-29 ENCOUNTER — APPOINTMENT (OUTPATIENT)
Dept: GASTROENTEROLOGY | Facility: CLINIC | Age: 84
End: 2024-03-29
Payer: MEDICARE

## 2024-03-30 DIAGNOSIS — I50.20 UNSPECIFIED SYSTOLIC (CONGESTIVE) HEART FAILURE (MULTI): ICD-10-CM

## 2024-04-01 RX ORDER — SACUBITRIL AND VALSARTAN 49; 51 MG/1; MG/1
1 TABLET, FILM COATED ORAL 2 TIMES DAILY
Qty: 60 TABLET | Refills: 11 | Status: SHIPPED | OUTPATIENT
Start: 2024-04-01 | End: 2025-04-01

## 2024-04-02 ENCOUNTER — APPOINTMENT (OUTPATIENT)
Dept: RADIOLOGY | Facility: HOSPITAL | Age: 84
End: 2024-04-02
Payer: MEDICARE

## 2024-04-16 ENCOUNTER — HOSPITAL ENCOUNTER (OUTPATIENT)
Dept: RADIOLOGY | Facility: HOSPITAL | Age: 84
Discharge: HOME | End: 2024-04-16
Payer: MEDICARE

## 2024-04-16 ENCOUNTER — HOSPITAL ENCOUNTER (OUTPATIENT)
Dept: CARDIOLOGY | Facility: HOSPITAL | Age: 84
Discharge: HOME | End: 2024-04-16
Payer: MEDICARE

## 2024-04-16 VITALS — OXYGEN SATURATION: 99 % | SYSTOLIC BLOOD PRESSURE: 101 MMHG | HEART RATE: 77 BPM | DIASTOLIC BLOOD PRESSURE: 69 MMHG

## 2024-04-16 DIAGNOSIS — Z95.0 PACEMAKER: ICD-10-CM

## 2024-04-16 DIAGNOSIS — R26.81 GAIT INSTABILITY: ICD-10-CM

## 2024-04-16 DIAGNOSIS — R53.1 RIGHT SIDED WEAKNESS: ICD-10-CM

## 2024-04-16 PROCEDURE — 93287 PERI-PX DEVICE EVAL & PRGR: CPT

## 2024-04-16 PROCEDURE — 93287 PERI-PX DEVICE EVAL & PRGR: CPT | Performed by: INTERNAL MEDICINE

## 2024-04-16 PROCEDURE — 70551 MRI BRAIN STEM W/O DYE: CPT | Performed by: RADIOLOGY

## 2024-04-16 PROCEDURE — 70551 MRI BRAIN STEM W/O DYE: CPT

## 2024-04-16 PROCEDURE — 2500000005 HC RX 250 GENERAL PHARMACY W/O HCPCS: Performed by: STUDENT IN AN ORGANIZED HEALTH CARE EDUCATION/TRAINING PROGRAM

## 2024-04-16 RX ADMIN — Medication: at 14:45

## 2024-04-16 NOTE — NURSING NOTE
Patient completed MRI and the device clinical nurse checked patient's pacemaker and turned pacing back on, per device clinical nurse. THOMAS

## 2024-04-16 NOTE — NURSING NOTE
The device clinical nurse arrived checked patient's pacemaker and turned pacing is off for MRI, per device clinical nurse. Patient voiced no c/o any discomfort and no signs of distress noted. THOMAS

## 2024-04-17 ENCOUNTER — OFFICE VISIT (OUTPATIENT)
Dept: PRIMARY CARE | Facility: CLINIC | Age: 84
End: 2024-04-17
Payer: MEDICARE

## 2024-04-17 VITALS
DIASTOLIC BLOOD PRESSURE: 60 MMHG | TEMPERATURE: 98.2 F | SYSTOLIC BLOOD PRESSURE: 121 MMHG | WEIGHT: 178 LBS | HEART RATE: 80 BPM | OXYGEN SATURATION: 92 % | BODY MASS INDEX: 23.48 KG/M2

## 2024-04-17 DIAGNOSIS — R06.02 SHORTNESS OF BREATH: Primary | ICD-10-CM

## 2024-04-17 DIAGNOSIS — D64.9 ANEMIA, UNSPECIFIED TYPE: ICD-10-CM

## 2024-04-17 DIAGNOSIS — R20.0 LEG NUMBNESS: ICD-10-CM

## 2024-04-17 PROCEDURE — 1159F MED LIST DOCD IN RCRD: CPT | Performed by: INTERNAL MEDICINE

## 2024-04-17 PROCEDURE — 99214 OFFICE O/P EST MOD 30 MIN: CPT | Performed by: INTERNAL MEDICINE

## 2024-04-17 PROCEDURE — 1160F RVW MEDS BY RX/DR IN RCRD: CPT | Performed by: INTERNAL MEDICINE

## 2024-04-17 NOTE — PROGRESS NOTES
"Chief Complaint:   Follow-up (MRI results)     History Of Present Illness:    Greg Comer \"Timothy\" is a 83 y.o. male with active medical problems outlined below who presents for  follow up of shortness of breath and left leg numbness.     INITIAL VISIT 2/13/24  Timothy has hx of CAD sp PC to LAD (10/21), severe aortic stenosis sp AVR followed by TAVR, HFrEF sp ICD placement, paroxysmal Afib, DM, HTN , HLD and BPH.  Last echo 2/6/23 demonstrated EF 30-35% with global hypokinesis.  Currently asymptomatic.  Hx of BPH sp TURP.  Recent cysto showed patent channel.  Urinary symptoms controlled.  Recently started dupixent for exzema and itch is much better.  Has history of normocytic anemia that corrected on labs 6/15/23.  A1C 6.0 on 9/27/23.  Has spinal cord stimulator - for back and leg pain, no longer necessary   Walking, weights daily  Good energy and good strength  UTD with eye exam   Colonoscopy has always been normal       INTERVAL HISTORY 4/17/24  Wants to work on exertional shortness of breath.  Has HFrEF, ICD, atrial fibrillation and sp TAVR.  Last echocardiogram 2/6/23 EF 30-35% with global hypokinesis.  No change since last visit, but gets OOB easily and wants to be more active.  Not having chest pain, palpitations or dizziness. No abdominal or ankle edema.  No trouble breathing in bed at night.   Recent workup for gait instability and peripheral neuropathy.   MRI completed 4/16/24 - no stroke, hemorrhage or mass.  Nonspecific volume loss and white matter changes.  Not suggestive of NPH.   Has lumbar DDD treated with spinal cord stimulator.  No longer needs the sitmulator, pain controlled.  Now having numbness in left leg and NCV ordered by neurology.   Wants to work with PT on numbness in left ankle   Dupixent working well - cleared up excema   Has taken sildenafil and would like a refill   Had some blood per rectum but that has cleared up - colonoscopy scheduled.          Patient Active Problem List   Diagnosis "    A-fib (Multi)    Anemia    Anxiety    Balance disorder    BPH (benign prostatic hyperplasia)    Bilateral presbyopia    CAD in native artery    Cardiomyopathy (Multi)    Constipation    Deviated nasal septum    Gait instability    GERD (gastroesophageal reflux disease)    H/O acute myocardial infarction    H/O prostate cancer    Heart failure, left, with LVEF <=30% (Multi)    Hypoxia    Intermediate stage nonexudative age-related macular degeneration of both eyes    Pseudophakia    Pulmonary hypertension (Multi)    Pulmonary hypertension due to lung diseases and hypoxia (Multi)    RBBB (right bundle branch block with left anterior fascicular block)    Rhinorrhea    S/P coronary artery stent placement    S/P TAVR (transcatheter aortic valve replacement)    Throat clearing    Urinary incontinence    Recurrent UTI    UTI symptoms    Right sided weakness    History of cardiac defibrillator placement    Hyperlipemia    Insulin resistance    Right bundle branch block    Controlled type 2 diabetes mellitus with hyperglycemia, without long-term current use of insulin (Multi)    Leg numbness    Shortness of breath       Current Outpatient Medications:     albuterol (ProAir HFA) 90 mcg/actuation inhaler, Inhale 2 puffs every 4 hours if needed for wheezing., Disp: , Rfl:     amiodarone (Pacerone) 200 mg tablet, TAKE 1 TABLET DAILY, Disp: 90 tablet, Rfl: 1    atorvastatin (Lipitor) 40 mg tablet, Take 1 tablet (40 mg) by mouth once daily., Disp: , Rfl:     azelastine (Astelin) 137 mcg (0.1 %) nasal spray, USE 2 SPRAYS INTO EACH NOSTRIL TWICE A DAY, Disp: 90 mL, Rfl: 1    carvedilol (Coreg) 3.125 mg tablet, Take 1 tablet (3.125 mg) by mouth 2 times a day with meals., Disp: 180 tablet, Rfl: 0    clopidogrel (Plavix) 75 mg tablet, Take 1 tablet (75 mg) by mouth once daily., Disp: 90 tablet, Rfl: 1    dupilumab (Dupixent) 300 mg/2 mL injection, Inject 2 mL (300 mg) under the skin 1 time., Disp: , Rfl:     Eliquis 5 mg tablet,  Take 1 tablet (5 mg) by mouth every 12 hours., Disp: , Rfl:     fish oil concentrate (Omega-3) 120-180 mg capsule, Take 1 capsule (1 g) by mouth once daily., Disp: , Rfl:     furosemide (Lasix) 20 mg tablet, Take 1 tablet (20 mg) by mouth once daily as needed., Disp: , Rfl:     Jardiance 10 mg, TAKE 1 TABLET BY MOUTH EVERY DAY, Disp: 90 tablet, Rfl: 3    melatonin 3 mg capsule, Take 3 mg by mouth once daily at bedtime., Disp: , Rfl:     multivitamin tablet, Take 1 tablet by mouth once daily., Disp: , Rfl:     Myrbetriq 50 mg tablet extended release 24 hr 24 hr tablet, Take 1 tablet (50 mg) by mouth once daily., Disp: 90 tablet, Rfl: 3    nitroglycerin (Nitrostat) 0.4 mg SL tablet, Place 1 tablet (0.4 mg) under the tongue every 5 minutes if needed. UP TO 3 DOSES AS NEEDED FOR CHEST PAIN., Disp: , Rfl:     sacubitriL-valsartan (Entresto) 49-51 mg tablet, Take 1 tablet by mouth 2 times a day., Disp: 60 tablet, Rfl: 11    spironolactone (Aldactone) 25 mg tablet, Take 1 tablet (25 mg) by mouth once daily., Disp: , Rfl:     tacrolimus (Protopic) 0.1 % ointment, Apply 1 Application topically 2 times a day., Disp: , Rfl:     traZODone (Desyrel) 100 mg tablet, Take 1 tablet (100 mg) by mouth once daily at bedtime., Disp: 30 tablet, Rfl: 5    triamcinolone (Kenalog) 0.1 % cream, Apply 1 Application topically., Disp: , Rfl:     vit C/E/Zn/coppr/lutein/zeaxan (PRESERVISION AREDS-2 ORAL), Take 2 capsules by mouth once daily., Disp: , Rfl:     docusate sodium (Colace) 100 mg capsule, Take 1 capsule (100 mg) by mouth twice a day., Disp: , Rfl:     fluocinolone (Derma-Smoothe) 0.01 % external oil, Apply topically 3 times a day., Disp: , Rfl:     sildenafil (Viagra) 100 mg tablet, Take 1 tablet (100 mg) by mouth if needed., Disp: , Rfl:   Patient has no known allergies.  Social History     Tobacco Use    Smoking status: Never    Smokeless tobacco: Never   Vaping Use    Vaping status: Never Used   Substance Use Topics    Alcohol use:  Not Currently     Alcohol/week: 7.0 standard drinks of alcohol     Types: 7 Standard drinks or equivalent per week     Comment: one drink per day - only one         All pertinent aspects of medical and surgical history were reviewed and updated in chart    Review of Systems   Pertinent positives in review of systems outlined above.  Complete ROS otherwise negative.        Last Recorded Vitals:  Patient Vitals for the past 24 hrs:   BP Temp Pulse SpO2 Weight   04/17/24 1136 121/60 36.8 °C (98.2 °F) 80 92 % 80.7 kg (178 lb)          Physical Exam  Constitutional:       Appearance: Normal appearance.   Eyes:      Extraocular Movements: Extraocular movements intact.      Conjunctiva/sclera: Conjunctivae normal.      Pupils: Pupils are equal, round, and reactive to light.   Cardiovascular:      Rate and Rhythm: Normal rate and regular rhythm.      Pulses: Normal pulses.      Heart sounds: Normal heart sounds.   Pulmonary:      Effort: Pulmonary effort is normal.      Breath sounds: Normal breath sounds.   Musculoskeletal:      Right lower leg: No edema.      Left lower leg: No edema.             Assessment/Plan   Problem List Items Addressed This Visit       Anemia     Recent H/H normal but leukocytosis on CBC.   Neurology ordered SPEP and serum free light chains.  Kappa/lambda ratio off.  Will repeat CBC now.           Relevant Orders    Ferritin    Iron and TIBC    Transferrin    Leg numbness     EMG/NCV scheduled.  May be related to spinal stenosis, radiculopathy vs peripheral neuropathy.  Recent A1C 6.0 and normal B12 9/27/23         Shortness of breath - Primary     Greg appears euvolemic.  He has baseline shortness of breath that has not clearly progressed.  Lungs are clear, no peripheral edema.  Heart rhythm regular.  Will repeat echocardiogram.  Recent TSH and Hemoglobin/Hematocrit normal.           Relevant Orders    Transthoracic Echo (TTE) Complete    CBC and Auto Differential    B-type natriuretic peptide        A total of 30 minutes was spent reviewing the chart and recent testing and discussing plan of care.         Charles Bradford MD

## 2024-04-18 PROBLEM — R06.02 SHORTNESS OF BREATH: Status: ACTIVE | Noted: 2024-04-18

## 2024-04-18 NOTE — ASSESSMENT & PLAN NOTE
Greg appears euvolemic.  He has baseline shortness of breath that has not clearly progressed.  Lungs are clear, no peripheral edema.  Heart rhythm regular.  Will repeat echocardiogram.  Recent TSH and Hemoglobin/Hematocrit normal.

## 2024-04-18 NOTE — ASSESSMENT & PLAN NOTE
EMG/NCV scheduled.  May be related to spinal stenosis, radiculopathy vs peripheral neuropathy.  Recent A1C 6.0 and normal B12 9/27/23

## 2024-04-18 NOTE — ASSESSMENT & PLAN NOTE
Recent H/H normal but leukocytosis on CBC.   Neurology ordered SPEP and serum free light chains.  Kappa/lambda ratio off.  Will repeat CBC now.     No

## 2024-04-22 ENCOUNTER — APPOINTMENT (OUTPATIENT)
Dept: NEUROLOGY | Facility: CLINIC | Age: 84
End: 2024-04-22
Payer: MEDICARE

## 2024-04-22 ENCOUNTER — LAB (OUTPATIENT)
Dept: LAB | Facility: LAB | Age: 84
End: 2024-04-22
Payer: MEDICARE

## 2024-04-22 DIAGNOSIS — R06.02 SHORTNESS OF BREATH: ICD-10-CM

## 2024-04-22 DIAGNOSIS — E11.65 CONTROLLED TYPE 2 DIABETES MELLITUS WITH HYPERGLYCEMIA, WITHOUT LONG-TERM CURRENT USE OF INSULIN (MULTI): ICD-10-CM

## 2024-04-22 DIAGNOSIS — D64.9 ANEMIA, UNSPECIFIED TYPE: ICD-10-CM

## 2024-04-22 LAB
BASOPHILS # BLD AUTO: 0.06 X10*3/UL (ref 0–0.1)
BASOPHILS NFR BLD AUTO: 0.7 %
BNP SERPL-MCNC: 180 PG/ML (ref 0–99)
EOSINOPHIL # BLD AUTO: 0.27 X10*3/UL (ref 0–0.4)
EOSINOPHIL NFR BLD AUTO: 3 %
ERYTHROCYTE [DISTWIDTH] IN BLOOD BY AUTOMATED COUNT: 15.9 % (ref 11.5–14.5)
HCT VFR BLD AUTO: 40 % (ref 41–52)
HGB BLD-MCNC: 11.9 G/DL (ref 13.5–17.5)
IMM GRANULOCYTES # BLD AUTO: 0.1 X10*3/UL (ref 0–0.5)
IMM GRANULOCYTES NFR BLD AUTO: 1.1 % (ref 0–0.9)
IRON SATN MFR SERPL: 10 % (ref 25–45)
IRON SERPL-MCNC: 41 UG/DL (ref 35–150)
LYMPHOCYTES # BLD AUTO: 1.22 X10*3/UL (ref 0.8–3)
LYMPHOCYTES NFR BLD AUTO: 13.6 %
MCH RBC QN AUTO: 24.6 PG (ref 26–34)
MCHC RBC AUTO-ENTMCNC: 29.8 G/DL (ref 32–36)
MCV RBC AUTO: 83 FL (ref 80–100)
MONOCYTES # BLD AUTO: 1.01 X10*3/UL (ref 0.05–0.8)
MONOCYTES NFR BLD AUTO: 11.2 %
NEUTROPHILS # BLD AUTO: 6.34 X10*3/UL (ref 1.6–5.5)
NEUTROPHILS NFR BLD AUTO: 70.4 %
NRBC BLD-RTO: 0 /100 WBCS (ref 0–0)
PLATELET # BLD AUTO: 261 X10*3/UL (ref 150–450)
RBC # BLD AUTO: 4.84 X10*6/UL (ref 4.5–5.9)
TIBC SERPL-MCNC: 421 UG/DL (ref 240–445)
UIBC SERPL-MCNC: 380 UG/DL (ref 110–370)
WBC # BLD AUTO: 9 X10*3/UL (ref 4.4–11.3)

## 2024-04-22 PROCEDURE — 82728 ASSAY OF FERRITIN: CPT

## 2024-04-22 PROCEDURE — 83880 ASSAY OF NATRIURETIC PEPTIDE: CPT

## 2024-04-22 PROCEDURE — 83540 ASSAY OF IRON: CPT

## 2024-04-22 PROCEDURE — 84466 ASSAY OF TRANSFERRIN: CPT

## 2024-04-22 PROCEDURE — 85025 COMPLETE CBC W/AUTO DIFF WBC: CPT

## 2024-04-22 PROCEDURE — 83036 HEMOGLOBIN GLYCOSYLATED A1C: CPT

## 2024-04-22 PROCEDURE — 83550 IRON BINDING TEST: CPT

## 2024-04-22 PROCEDURE — 36415 COLL VENOUS BLD VENIPUNCTURE: CPT

## 2024-04-23 ENCOUNTER — PATIENT MESSAGE (OUTPATIENT)
Dept: PRIMARY CARE | Facility: HOSPITAL | Age: 84
End: 2024-04-23

## 2024-04-23 DIAGNOSIS — D50.9 IRON DEFICIENCY ANEMIA, UNSPECIFIED IRON DEFICIENCY ANEMIA TYPE: Primary | ICD-10-CM

## 2024-04-23 LAB
EST. AVERAGE GLUCOSE BLD GHB EST-MCNC: 131 MG/DL
FERRITIN SERPL-MCNC: 20 NG/ML (ref 20–300)
HBA1C MFR BLD: 6.2 %
TRANSFERRIN SERPL-MCNC: 334 MG/DL (ref 200–360)

## 2024-04-25 ENCOUNTER — DOCUMENTATION (OUTPATIENT)
Dept: PHYSICAL THERAPY | Facility: CLINIC | Age: 84
End: 2024-04-25
Payer: MEDICARE

## 2024-04-25 NOTE — PROGRESS NOTES
"Physical Therapy    Discharge Summary    Name: Greg Comer \"Omari"  MRN: [id number]  : 1940  Date: 24    Discharge Summary: PT    Discharge Information: Date of discharge 24, Date of last visit 24, Date of evaluation 24, Number of attended visits 2, Referred by Jitendra Foster MD, and Referred for balance abnormalities and left ankle numbness    Therapy Summary: Plan of care consisted of improving ankle ROM and strength to facilitate gait and ADLs.     Discharge Status: Discharge to a home exercise program.      Rehab Discharge Reason: Failed to schedule and/or keep follow-up appointment(s)  "

## 2024-04-27 DIAGNOSIS — I25.10 ATHEROSCLEROTIC HEART DISEASE OF NATIVE CORONARY ARTERY WITHOUT ANGINA PECTORIS: ICD-10-CM

## 2024-04-29 DIAGNOSIS — I48.91 UNSPECIFIED ATRIAL FIBRILLATION (MULTI): ICD-10-CM

## 2024-04-29 RX ORDER — ATORVASTATIN CALCIUM 40 MG/1
40 TABLET, FILM COATED ORAL DAILY
Qty: 90 TABLET | Refills: 3 | Status: SHIPPED | OUTPATIENT
Start: 2024-04-29

## 2024-04-29 RX ORDER — APIXABAN 5 MG/1
5 TABLET, FILM COATED ORAL EVERY 12 HOURS
Qty: 60 TABLET | Refills: 11 | Status: SHIPPED | OUTPATIENT
Start: 2024-04-29

## 2024-05-06 DIAGNOSIS — I42.9 CARDIOMYOPATHY, UNSPECIFIED (MULTI): ICD-10-CM

## 2024-05-06 RX ORDER — SPIRONOLACTONE 25 MG/1
25 TABLET ORAL DAILY
Qty: 90 TABLET | Refills: 2 | Status: SHIPPED | OUTPATIENT
Start: 2024-05-06

## 2024-05-09 RX ORDER — ONDANSETRON 4 MG/1
4 TABLET, FILM COATED ORAL DAILY PRN
Qty: 15 TABLET | Refills: 0 | OUTPATIENT
Start: 2024-05-09

## 2024-05-09 NOTE — TELEPHONE ENCOUNTER
I have not personally taken care of this patient and do not feeel comfortably renewing his zofran at this time. I recommend he reach out to his PCP and ask for a refill during his visit

## 2024-05-10 ENCOUNTER — PATIENT MESSAGE (OUTPATIENT)
Dept: PRIMARY CARE | Facility: CLINIC | Age: 84
End: 2024-05-10
Payer: MEDICARE

## 2024-05-10 DIAGNOSIS — D50.9 IRON DEFICIENCY ANEMIA, UNSPECIFIED IRON DEFICIENCY ANEMIA TYPE: Primary | ICD-10-CM

## 2024-05-10 DIAGNOSIS — K62.5 BLOOD PER RECTUM: ICD-10-CM

## 2024-05-20 ENCOUNTER — APPOINTMENT (OUTPATIENT)
Dept: NEUROLOGY | Facility: CLINIC | Age: 84
End: 2024-05-20
Payer: MEDICARE

## 2024-05-20 ENCOUNTER — HOSPITAL ENCOUNTER (OUTPATIENT)
Dept: CARDIOLOGY | Facility: CLINIC | Age: 84
Discharge: HOME | End: 2024-05-20
Payer: MEDICARE

## 2024-05-20 ENCOUNTER — APPOINTMENT (OUTPATIENT)
Dept: PRIMARY CARE | Facility: HOSPITAL | Age: 84
End: 2024-05-20
Payer: MEDICARE

## 2024-05-20 DIAGNOSIS — Z95.810 PRESENCE OF AUTOMATIC CARDIOVERTER/DEFIBRILLATOR (AICD): ICD-10-CM

## 2024-05-20 DIAGNOSIS — I42.9 CARDIOMYOPATHY, UNSPECIFIED TYPE (MULTI): ICD-10-CM

## 2024-05-21 ENCOUNTER — APPOINTMENT (OUTPATIENT)
Dept: GASTROENTEROLOGY | Facility: HOSPITAL | Age: 84
End: 2024-05-21
Payer: MEDICARE

## 2024-06-04 ENCOUNTER — APPOINTMENT (OUTPATIENT)
Dept: OTOLARYNGOLOGY | Facility: CLINIC | Age: 84
End: 2024-06-04
Payer: MEDICARE

## 2024-06-05 ENCOUNTER — HOSPITAL ENCOUNTER (OUTPATIENT)
Dept: CARDIOLOGY | Facility: HOSPITAL | Age: 84
Discharge: HOME | End: 2024-06-05
Payer: MEDICARE

## 2024-06-05 ENCOUNTER — LAB (OUTPATIENT)
Dept: LAB | Facility: LAB | Age: 84
End: 2024-06-05
Payer: MEDICARE

## 2024-06-05 DIAGNOSIS — I35.8 OTHER NONRHEUMATIC AORTIC VALVE DISORDERS: ICD-10-CM

## 2024-06-05 DIAGNOSIS — D50.9 IRON DEFICIENCY ANEMIA, UNSPECIFIED IRON DEFICIENCY ANEMIA TYPE: ICD-10-CM

## 2024-06-05 DIAGNOSIS — R06.02 SHORTNESS OF BREATH: ICD-10-CM

## 2024-06-05 LAB
AORTIC VALVE MEAN GRADIENT: 5 MMHG
AORTIC VALVE PEAK VELOCITY: 1.56 M/S
AV PEAK GRADIENT: 9.7 MMHG
AVA (PEAK VEL): 1.5 CM2
AVA (VTI): 1.84 CM2
BASOPHILS # BLD AUTO: 0.05 X10*3/UL (ref 0–0.1)
BASOPHILS NFR BLD AUTO: 0.5 %
EJECTION FRACTION APICAL 4 CHAMBER: 42.2
EOSINOPHIL # BLD AUTO: 0.26 X10*3/UL (ref 0–0.4)
EOSINOPHIL NFR BLD AUTO: 2.7 %
ERYTHROCYTE [DISTWIDTH] IN BLOOD BY AUTOMATED COUNT: 18.8 % (ref 11.5–14.5)
FERRITIN SERPL-MCNC: 25 NG/ML (ref 20–300)
HCT VFR BLD AUTO: 41.4 % (ref 41–52)
HGB BLD-MCNC: 12.7 G/DL (ref 13.5–17.5)
IMM GRANULOCYTES # BLD AUTO: 0.07 X10*3/UL (ref 0–0.5)
IMM GRANULOCYTES NFR BLD AUTO: 0.7 % (ref 0–0.9)
IRON SATN MFR SERPL: 68 % (ref 25–45)
IRON SERPL-MCNC: 266 UG/DL (ref 35–150)
LEFT ATRIUM VOLUME AREA LENGTH INDEX BSA: 23.5 ML/M2
LEFT VENTRICLE INTERNAL DIMENSION DIASTOLE: 3.6 CM (ref 3.5–6)
LEFT VENTRICULAR OUTFLOW TRACT DIAMETER: 2 CM
LV EJECTION FRACTION BIPLANE: 44 %
LYMPHOCYTES # BLD AUTO: 1.06 X10*3/UL (ref 0.8–3)
LYMPHOCYTES NFR BLD AUTO: 10.8 %
MCH RBC QN AUTO: 25.4 PG (ref 26–34)
MCHC RBC AUTO-ENTMCNC: 30.7 G/DL (ref 32–36)
MCV RBC AUTO: 83 FL (ref 80–100)
MITRAL VALVE E/A RATIO: 0.52
MITRAL VALVE E/E' RATIO: 7.15
MONOCYTES # BLD AUTO: 0.91 X10*3/UL (ref 0.05–0.8)
MONOCYTES NFR BLD AUTO: 9.3 %
NEUTROPHILS # BLD AUTO: 7.43 X10*3/UL (ref 1.6–5.5)
NEUTROPHILS NFR BLD AUTO: 76 %
NRBC BLD-RTO: 0 /100 WBCS (ref 0–0)
PLATELET # BLD AUTO: 258 X10*3/UL (ref 150–450)
RBC # BLD AUTO: 5 X10*6/UL (ref 4.5–5.9)
RIGHT VENTRICLE FREE WALL PEAK S': 11.2 CM/S
RIGHT VENTRICLE PEAK SYSTOLIC PRESSURE: 33 MMHG
TIBC SERPL-MCNC: 391 UG/DL (ref 240–445)
TRANSFERRIN SERPL-MCNC: 278 MG/DL (ref 200–360)
TRICUSPID ANNULAR PLANE SYSTOLIC EXCURSION: 2.6 CM
UIBC SERPL-MCNC: 125 UG/DL (ref 110–370)
WBC # BLD AUTO: 9.8 X10*3/UL (ref 4.4–11.3)

## 2024-06-05 PROCEDURE — 93306 TTE W/DOPPLER COMPLETE: CPT | Performed by: INTERNAL MEDICINE

## 2024-06-05 PROCEDURE — 85025 COMPLETE CBC W/AUTO DIFF WBC: CPT

## 2024-06-05 PROCEDURE — 84466 ASSAY OF TRANSFERRIN: CPT

## 2024-06-05 PROCEDURE — 83540 ASSAY OF IRON: CPT

## 2024-06-05 PROCEDURE — 82728 ASSAY OF FERRITIN: CPT

## 2024-06-05 PROCEDURE — 93306 TTE W/DOPPLER COMPLETE: CPT

## 2024-06-05 PROCEDURE — 36415 COLL VENOUS BLD VENIPUNCTURE: CPT

## 2024-06-05 PROCEDURE — 83550 IRON BINDING TEST: CPT

## 2024-06-06 ENCOUNTER — APPOINTMENT (OUTPATIENT)
Dept: PRIMARY CARE | Facility: HOSPITAL | Age: 84
End: 2024-06-06
Payer: MEDICARE

## 2024-06-12 ENCOUNTER — APPOINTMENT (OUTPATIENT)
Dept: PRIMARY CARE | Facility: CLINIC | Age: 84
End: 2024-06-12
Payer: MEDICARE

## 2024-06-12 VITALS
DIASTOLIC BLOOD PRESSURE: 60 MMHG | BODY MASS INDEX: 23.48 KG/M2 | OXYGEN SATURATION: 96 % | SYSTOLIC BLOOD PRESSURE: 120 MMHG | WEIGHT: 178 LBS | HEART RATE: 71 BPM

## 2024-06-12 DIAGNOSIS — E11.65 CONTROLLED TYPE 2 DIABETES MELLITUS WITH HYPERGLYCEMIA, WITHOUT LONG-TERM CURRENT USE OF INSULIN (MULTI): ICD-10-CM

## 2024-06-12 DIAGNOSIS — D50.9 IRON DEFICIENCY ANEMIA, UNSPECIFIED IRON DEFICIENCY ANEMIA TYPE: ICD-10-CM

## 2024-06-12 DIAGNOSIS — I25.10 CAD IN NATIVE ARTERY: Primary | ICD-10-CM

## 2024-06-12 DIAGNOSIS — E55.9 VITAMIN D DEFICIENCY: ICD-10-CM

## 2024-06-12 NOTE — PROGRESS NOTES
"Chief Complaint:   Follow-up     History Of Present Illness:    Greg Comer \"Timothy\" is a 83 y.o. male with active medical problems outlined below who presents for bp check and to follow up on heme pos stools.      INITIAL VISIT 2/13/24  Timothy has hx of CAD sp PC to LAD (10/21), severe aortic stenosis sp AVR followed by TAVR, HFrEF sp ICD placement, paroxysmal Afib, DM, HTN , HLD and BPH.  Last echo 2/6/23 demonstrated EF 30-35% with global hypokinesis.  Currently asymptomatic.  Hx of BPH sp TURP.  Recent cysto showed patent channel.  Urinary symptoms controlled.  Recently started dupixent for exzema and itch is much better.  Has history of normocytic anemia that corrected on labs 6/15/23.  A1C 6.0 on 9/27/23.  Has spinal cord stimulator - for back and leg pain, no longer necessary   Walking, weights daily  Good energy and good strength  UTD with eye exam   Colonoscopy has always been normal       INTERVAL HISTORY 4/17/24  Wants to work on exertional shortness of breath.  Has HFrEF, ICD, atrial fibrillation and sp TAVR.  Last echocardiogram 2/6/23 EF 30-35% with global hypokinesis.  No change since last visit, but gets OOB easily and wants to be more active.  Not having chest pain, palpitations or dizziness. No abdominal or ankle edema.  No trouble breathing in bed at night.   Recent workup for gait instability and peripheral neuropathy.   MRI completed 4/16/24 - no stroke, hemorrhage or mass.  Nonspecific volume loss and white matter changes.  Not suggestive of NPH.   Has lumbar DDD treated with spinal cord stimulator.  No longer needs the sitmulator, pain controlled.  Now having numbness in left leg and NCV ordered by neurology.   Wants to work with PT on numbness in left ankle   Dupixent working well - cleared up excema   Has taken sildenafil and would like a refill   Had some blood per rectum but that has cleared up - colonoscopy scheduled.     INTERVAL HISTORY 6/12/24  Recent workup for anemia showed Iron 41 " and Ferritin 20.  Hemoccult was positive and EGD and colonoscopy ordered.  Timothy is on the fence about having the testing done.  Recently experiencing increased shortness of breath.  Echocardiogram was completed and demonstrated ejection fraction 40 to 45%.  1 year ago his ejection fraction was 30 to 35%.  There were no new findings.  He has ongoing workup for pain and numbness in his left ankle.  EMG/nerve conduction studies are scheduled this month.  His symptoms are unchanged.  He tries to stay active and reports his breathing is improving overall.  He uses a rollator or cane for ambulation.  No change in urinary symptoms.       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 (Multi)    RBBB (right bundle branch block with left anterior fascicular block)    Rhinorrhea    S/P coronary artery stent placement    S/P TAVR (transcatheter aortic valve replacement)    Throat clearing    Urinary incontinence    Recurrent UTI    UTI symptoms    Right sided weakness    History of cardiac defibrillator placement    Hyperlipemia    Insulin resistance    Right bundle branch block    Controlled type 2 diabetes mellitus with hyperglycemia, without long-term current use of insulin (Multi)    Leg numbness    Shortness of breath    Vitamin D deficiency       Current Outpatient Medications:     albuterol (ProAir HFA) 90 mcg/actuation inhaler, Inhale 2 puffs every 4 hours  if needed for wheezing., Disp: , Rfl:     amiodarone (Pacerone) 200 mg tablet, TAKE 1 TABLET DAILY, Disp: 90 tablet, Rfl: 1    atorvastatin (Lipitor) 40 mg tablet, TAKE 1 TABLET BY MOUTH EVERY DAY, Disp: 90 tablet, Rfl: 3    azelastine (Astelin) 137 mcg (0.1 %) nasal spray, USE 2 SPRAYS INTO EACH NOSTRIL TWICE A DAY, Disp: 90 mL, Rfl: 1    carvedilol (Coreg) 3.125 mg tablet, Take 1 tablet (3.125 mg) by mouth 2 times a day with meals., Disp: 180 tablet, Rfl: 0    clopidogrel (Plavix) 75 mg tablet, Take 1 tablet (75 mg) by mouth once daily., Disp: 90 tablet, Rfl: 1    docusate sodium (Colace) 100 mg capsule, Take 1 capsule (100 mg) by mouth twice a day., Disp: , Rfl:     dupilumab (Dupixent) 300 mg/2 mL injection, Inject 2 mL (300 mg) under the skin 1 time., Disp: , Rfl:     Eliquis 5 mg tablet, TAKE 1 TABLET BY MOUTH EVERY 12 HOURS, Disp: 60 tablet, Rfl: 11    fish oil concentrate (Omega-3) 120-180 mg capsule, Take 1 capsule (1 g) by mouth once daily., Disp: , Rfl:     fluocinolone (Derma-Smoothe) 0.01 % external oil, Apply topically 3 times a day., Disp: , Rfl:     furosemide (Lasix) 20 mg tablet, Take 1 tablet (20 mg) by mouth once daily as needed., Disp: , Rfl:     Jardiance 10 mg, TAKE 1 TABLET BY MOUTH EVERY DAY, Disp: 90 tablet, Rfl: 3    melatonin 3 mg capsule, Take 3 mg by mouth once daily at bedtime., Disp: , Rfl:     multivitamin tablet, Take 1 tablet by mouth once daily., Disp: , Rfl:     Myrbetriq 50 mg tablet extended release 24 hr 24 hr tablet, Take 1 tablet (50 mg) by mouth once daily., Disp: 90 tablet, Rfl: 3    nitroglycerin (Nitrostat) 0.4 mg SL tablet, Place 1 tablet (0.4 mg) under the tongue every 5 minutes if needed. UP TO 3 DOSES AS NEEDED FOR CHEST PAIN., Disp: , Rfl:     sacubitriL-valsartan (Entresto) 49-51 mg tablet, Take 1 tablet by mouth 2 times a day., Disp: 60 tablet, Rfl: 11    sildenafil (Viagra) 100 mg tablet, Take 1 tablet (100 mg) by mouth if needed., Disp: , Rfl:     spironolactone  (Aldactone) 25 mg tablet, TAKE 1 TABLET DAILY, Disp: 90 tablet, Rfl: 2    tacrolimus (Protopic) 0.1 % ointment, Apply 1 Application topically 2 times a day., Disp: , Rfl:     traZODone (Desyrel) 100 mg tablet, Take 1 tablet (100 mg) by mouth once daily at bedtime., Disp: 30 tablet, Rfl: 5    triamcinolone (Kenalog) 0.1 % cream, Apply 1 Application topically., Disp: , Rfl:     vit C/E/Zn/coppr/lutein/zeaxan (PRESERVISION AREDS-2 ORAL), Take 2 capsules by mouth once daily., Disp: , Rfl:   Patient has no known allergies.  Social History     Tobacco Use    Smoking status: Never    Smokeless tobacco: Never   Vaping Use    Vaping status: Never Used   Substance Use Topics    Alcohol use: Not Currently     Alcohol/week: 7.0 standard drinks of alcohol     Types: 7 Standard drinks or equivalent per week     Comment: one drink per day - only one         All pertinent aspects of medical and surgical history were reviewed and updated in chart    Review of Systems   Pertinent positives in review of systems outlined above.  Complete ROS otherwise negative.        Last Recorded Vitals:  No data found.         Physical Exam  Eyes:      Extraocular Movements: Extraocular movements intact.      Conjunctiva/sclera: Conjunctivae normal.      Pupils: Pupils are equal, round, and reactive to light.   Cardiovascular:      Rate and Rhythm: Normal rate and regular rhythm.      Pulses: Normal pulses.      Heart sounds: Normal heart sounds.   Pulmonary:      Effort: Pulmonary effort is normal.      Breath sounds: Normal breath sounds.   Musculoskeletal:      Right lower leg: No edema.      Left lower leg: No edema.           Assessment/Plan   Problem List Items Addressed This Visit       Anemia     We discussed that low-normal iron, ferrtin of 20 and heme pos stools are indications for GI workup.  Encouraged to follow through with EGD and colonoscopy.  To continue iron supplement 3d per week for now.          Relevant Orders    Comprehensive  Metabolic Panel    CBC and Auto Differential    Ferritin    Iron and TIBC    Transferrin    CAD in native artery - Primary     Breathing at baseline.  Recent echo showed improved EF.  Appears euvolemic on exam.  Timothy sees his cardiologist on a regular basis.          Relevant Orders    Comprehensive Metabolic Panel    Controlled type 2 diabetes mellitus with hyperglycemia, without long-term current use of insulin (Multi)     FBS and A1C with next labs.          Relevant Orders    Comprehensive Metabolic Panel    Hemoglobin A1C    Vitamin D deficiency    Relevant Orders    Vitamin D 25-Hydroxy,Total (for eval of Vitamin D levels)       A total of 30 minutes was spent reviewing the chart and recent testing and discussing plan of care.         Charles Bradford MD

## 2024-06-13 ENCOUNTER — HOSPITAL ENCOUNTER (OUTPATIENT)
Dept: NEUROLOGY | Facility: CLINIC | Age: 84
Discharge: HOME | End: 2024-06-13
Payer: MEDICARE

## 2024-06-13 DIAGNOSIS — R20.0 NUMBNESS AND TINGLING OF LEFT LEG: ICD-10-CM

## 2024-06-13 DIAGNOSIS — R20.2 NUMBNESS AND TINGLING OF LEFT LEG: ICD-10-CM

## 2024-06-13 PROCEDURE — 95886 MUSC TEST DONE W/N TEST COMP: CPT | Performed by: PSYCHIATRY & NEUROLOGY

## 2024-06-13 PROCEDURE — 95911 NRV CNDJ TEST 9-10 STUDIES: CPT | Performed by: PSYCHIATRY & NEUROLOGY

## 2024-06-16 PROBLEM — E55.9 VITAMIN D DEFICIENCY: Status: ACTIVE | Noted: 2024-06-16

## 2024-06-16 NOTE — ASSESSMENT & PLAN NOTE
We discussed that low-normal iron, ferrtin of 20 and heme pos stools are indications for GI workup.  Encouraged to follow through with EGD and colonoscopy.  To continue iron supplement 3d per week for now.

## 2024-06-16 NOTE — ASSESSMENT & PLAN NOTE
Breathing at baseline.  Recent echo showed improved EF.  Appears euvolemic on exam.  Timothy sees his cardiologist on a regular basis.

## 2024-06-21 ENCOUNTER — LAB (OUTPATIENT)
Dept: LAB | Facility: LAB | Age: 84
End: 2024-06-21
Payer: MEDICARE

## 2024-06-21 DIAGNOSIS — E11.65 CONTROLLED TYPE 2 DIABETES MELLITUS WITH HYPERGLYCEMIA, WITHOUT LONG-TERM CURRENT USE OF INSULIN (MULTI): ICD-10-CM

## 2024-06-21 DIAGNOSIS — D50.9 IRON DEFICIENCY ANEMIA, UNSPECIFIED IRON DEFICIENCY ANEMIA TYPE: ICD-10-CM

## 2024-06-21 DIAGNOSIS — I25.10 CAD IN NATIVE ARTERY: ICD-10-CM

## 2024-06-21 DIAGNOSIS — E55.9 VITAMIN D DEFICIENCY: ICD-10-CM

## 2024-06-21 LAB
ALBUMIN SERPL BCP-MCNC: 3.9 G/DL (ref 3.4–5)
ALP SERPL-CCNC: 60 U/L (ref 33–136)
ALT SERPL W P-5'-P-CCNC: 14 U/L (ref 10–52)
ANION GAP SERPL CALC-SCNC: 12 MMOL/L (ref 10–20)
AST SERPL W P-5'-P-CCNC: 19 U/L (ref 9–39)
BASOPHILS # BLD AUTO: 0.08 X10*3/UL (ref 0–0.1)
BASOPHILS NFR BLD AUTO: 0.7 %
BILIRUB SERPL-MCNC: 0.4 MG/DL (ref 0–1.2)
BUN SERPL-MCNC: 15 MG/DL (ref 6–23)
CALCIUM SERPL-MCNC: 9.3 MG/DL (ref 8.6–10.3)
CHLORIDE SERPL-SCNC: 97 MMOL/L (ref 98–107)
CO2 SERPL-SCNC: 32 MMOL/L (ref 21–32)
CREAT SERPL-MCNC: 1.01 MG/DL (ref 0.5–1.3)
EGFRCR SERPLBLD CKD-EPI 2021: 73 ML/MIN/1.73M*2
EOSINOPHIL # BLD AUTO: 0.27 X10*3/UL (ref 0–0.4)
EOSINOPHIL NFR BLD AUTO: 2.2 %
ERYTHROCYTE [DISTWIDTH] IN BLOOD BY AUTOMATED COUNT: 18.8 % (ref 11.5–14.5)
FERRITIN SERPL-MCNC: 25 NG/ML (ref 20–300)
GLUCOSE SERPL-MCNC: 77 MG/DL (ref 74–99)
HCT VFR BLD AUTO: 41.4 % (ref 41–52)
HGB BLD-MCNC: 13.1 G/DL (ref 13.5–17.5)
IMM GRANULOCYTES # BLD AUTO: 0.06 X10*3/UL (ref 0–0.5)
IMM GRANULOCYTES NFR BLD AUTO: 0.5 % (ref 0–0.9)
IRON SATN MFR SERPL: 10 % (ref 25–45)
IRON SERPL-MCNC: 43 UG/DL (ref 35–150)
LYMPHOCYTES # BLD AUTO: 1.23 X10*3/UL (ref 0.8–3)
LYMPHOCYTES NFR BLD AUTO: 10 %
MCH RBC QN AUTO: 25.5 PG (ref 26–34)
MCHC RBC AUTO-ENTMCNC: 31.6 G/DL (ref 32–36)
MCV RBC AUTO: 81 FL (ref 80–100)
MONOCYTES # BLD AUTO: 1.12 X10*3/UL (ref 0.05–0.8)
MONOCYTES NFR BLD AUTO: 9.2 %
NEUTROPHILS # BLD AUTO: 9.48 X10*3/UL (ref 1.6–5.5)
NEUTROPHILS NFR BLD AUTO: 77.4 %
NRBC BLD-RTO: 0 /100 WBCS (ref 0–0)
PLATELET # BLD AUTO: 264 X10*3/UL (ref 150–450)
POTASSIUM SERPL-SCNC: 5.1 MMOL/L (ref 3.5–5.3)
PROT SERPL-MCNC: 7.3 G/DL (ref 6.4–8.2)
RBC # BLD AUTO: 5.14 X10*6/UL (ref 4.5–5.9)
SODIUM SERPL-SCNC: 136 MMOL/L (ref 136–145)
TIBC SERPL-MCNC: 432 UG/DL (ref 240–445)
TRANSFERRIN SERPL-MCNC: 328 MG/DL (ref 200–360)
UIBC SERPL-MCNC: 389 UG/DL (ref 110–370)
WBC # BLD AUTO: 12.2 X10*3/UL (ref 4.4–11.3)

## 2024-06-21 PROCEDURE — 82728 ASSAY OF FERRITIN: CPT

## 2024-06-21 PROCEDURE — 83550 IRON BINDING TEST: CPT

## 2024-06-21 PROCEDURE — 80053 COMPREHEN METABOLIC PANEL: CPT

## 2024-06-21 PROCEDURE — 84466 ASSAY OF TRANSFERRIN: CPT

## 2024-06-21 PROCEDURE — 36415 COLL VENOUS BLD VENIPUNCTURE: CPT

## 2024-06-21 PROCEDURE — 83540 ASSAY OF IRON: CPT

## 2024-06-21 PROCEDURE — 82306 VITAMIN D 25 HYDROXY: CPT

## 2024-06-21 PROCEDURE — 85025 COMPLETE CBC W/AUTO DIFF WBC: CPT

## 2024-06-21 PROCEDURE — 83036 HEMOGLOBIN GLYCOSYLATED A1C: CPT

## 2024-06-22 LAB
25(OH)D3 SERPL-MCNC: 42 NG/ML (ref 30–100)
EST. AVERAGE GLUCOSE BLD GHB EST-MCNC: 126 MG/DL
HBA1C MFR BLD: 6 %

## 2024-06-23 ENCOUNTER — PATIENT MESSAGE (OUTPATIENT)
Dept: PRIMARY CARE | Facility: CLINIC | Age: 84
End: 2024-06-23
Payer: MEDICARE

## 2024-06-23 DIAGNOSIS — D64.9 ANEMIA, UNSPECIFIED TYPE: Primary | ICD-10-CM

## 2024-06-26 ENCOUNTER — APPOINTMENT (OUTPATIENT)
Dept: OTOLARYNGOLOGY | Facility: CLINIC | Age: 84
End: 2024-06-26
Payer: MEDICARE

## 2024-06-26 DIAGNOSIS — J34.2 NASAL SEPTAL DEVIATION: ICD-10-CM

## 2024-06-26 DIAGNOSIS — J34.89 NASAL LESION: Primary | ICD-10-CM

## 2024-06-26 DIAGNOSIS — J34.89 NASAL AND SINUS DISCHARGE: ICD-10-CM

## 2024-06-26 DIAGNOSIS — M54.16 LUMBAR RADICULOPATHY: Primary | ICD-10-CM

## 2024-06-26 PROCEDURE — 1159F MED LIST DOCD IN RCRD: CPT | Performed by: NURSE PRACTITIONER

## 2024-06-26 PROCEDURE — 1036F TOBACCO NON-USER: CPT | Performed by: NURSE PRACTITIONER

## 2024-06-26 PROCEDURE — 1160F RVW MEDS BY RX/DR IN RCRD: CPT | Performed by: NURSE PRACTITIONER

## 2024-06-26 PROCEDURE — 99213 OFFICE O/P EST LOW 20 MIN: CPT | Performed by: NURSE PRACTITIONER

## 2024-06-26 RX ORDER — CLINDAMYCIN HYDROCHLORIDE 150 MG/1
CAPSULE ORAL
COMMUNITY
Start: 2024-06-03

## 2024-06-26 RX ORDER — CETIRIZINE HYDROCHLORIDE 10 MG/1
10 TABLET ORAL
COMMUNITY
Start: 2024-05-29

## 2024-06-26 ASSESSMENT — PATIENT HEALTH QUESTIONNAIRE - PHQ9
2. FEELING DOWN, DEPRESSED OR HOPELESS: NOT AT ALL
1. LITTLE INTEREST OR PLEASURE IN DOING THINGS: NOT AT ALL
SUM OF ALL RESPONSES TO PHQ9 QUESTIONS 1 AND 2: 0

## 2024-06-26 NOTE — PROGRESS NOTES
"Subjective   Patient ID: Greg Comer \"Oamri" is a 83 y.o. male who presents for No chief complaint on file..  HPI  6/26/24- Patient presents for routine follow-up. He continues to do well with use of the azelastine. He feels that this clears his drainage. He breathes clear. He denies pain or pressure. He does feel he has itchy ears.     Review of Systems  Review of systems is negative for constitutional, ophthalmological, cardiac, pulmonary, renal, gastrointestinal, musculoskeletal, mental health, endocrine, or neurologic disorders (except as listed in the HPI, PMH, and Problem List).     Objective   Physical Exam  CONSTITUTIONAL: Patient appears well developed and well nourished.   GENERAL: this is a healthy appearing male who appears stated age. The patient is alert and appropriately verbally conversant without hoarseness. This patient is in no apparent distress.   FACE: The face was inspected and no cutaneous masses or lesions were visualized. There was no erythema or edema noted. Facial movement was symmetric. No skin lesions were detected.   EYES: Extra-ocular muscle function was intact. No nystagmus was observed. Pupils were equal.   NOSE: Examination of the nose revealed the nasal dorsum to be midline. Intranasal exam reveals the septum is left deviated. The inferior turbinates were hyperemic. No masses, polyps, mucopus, or other lesion on anterior rhinoscopy. See below procedure note as applicable for further exam.    EARS: Right EAC with soft cerumen, wet. Left normal with scarring.   RESPIRATORY: Normal inspiration and expiration and chest wall expansion, no use of accessory muscles to breathe, no stridor.  NEUROLOGICAL: Patient is ambulatory without assist. Mentation is clear. Answering questions appropriately.         Assessment/Plan   ASSESSMENT:  Patient is an 81yo M with history of chronic rhinitis with temporary improvement with ipratropium bromide. Rec. trial of azelastine prior to consideration " of procedures.  12/5/23- Continued stable lesion along the right middle turbinate. Doing well with azelastine.   6/26/24- Doing very well with azelastine. Will continue. Will see otology for ears (build up of wax and debrox).      PLAN:  1. Anterior exam: Stable lesion on anterior aspect of the right middle turbinate, hyperemia of inferior turbinate, left dev.  2. I recommended that the patient continue azelastine nasal spray. Advised he discontinue for adverse effects.  3. Advised to see otology for ear complaints.  4. If he continues doing well with the above therapy, he will follow up in 12 months or sooner if needed.     All questions were answered and patient agrees with established plan of care.

## 2024-06-26 NOTE — PROGRESS NOTES
I called to discuss the results of his EMG which showed a left L5-S1 radiculopathy with some active denervation. There is the possibility of a peripheral neuropathy as well as a left median neuropathy, which seemed asymptomatic. We will start with an MRI of the L spine and will have to recheck the kappa lambda chain due to the elevation in [place]. He understands and orders are placed.     Addendum:  Also he requested to be evaluated for acupuncture for his leg, so I placed a referral to [facility] for this.

## 2024-06-26 NOTE — PATIENT INSTRUCTIONS
Today you were evaluated by Daphnie Cool CNP.    Please follow-up annually or sooner if needed. If you have any questions or concerns, please contact my office at [phone].     Begin claritin.    See otology. Please contact our scheduling and  service at [phone]. They will work with you to get your test, imaging, or other appointments scheduled that might have been ordered.     Begin azelastine nasal spray. As discussed, this may be sedating, if too much decrease use to at night. This is also available over the counter. If more cost-effective, you may obtain it this way. It is called ASTEPRO.

## 2024-06-28 ENCOUNTER — APPOINTMENT (OUTPATIENT)
Dept: OPHTHALMOLOGY | Facility: CLINIC | Age: 84
End: 2024-06-28
Payer: MEDICARE

## 2024-06-30 DIAGNOSIS — G47.00 INSOMNIA, UNSPECIFIED TYPE: ICD-10-CM

## 2024-07-01 DIAGNOSIS — G47.00 INSOMNIA, UNSPECIFIED TYPE: ICD-10-CM

## 2024-07-01 RX ORDER — TRAZODONE HYDROCHLORIDE 100 MG/1
100 TABLET ORAL NIGHTLY
Qty: 30 TABLET | Refills: 5 | Status: SHIPPED | OUTPATIENT
Start: 2024-07-01

## 2024-07-04 DIAGNOSIS — I48.91 UNSPECIFIED ATRIAL FIBRILLATION (MULTI): ICD-10-CM

## 2024-07-04 DIAGNOSIS — R11.0 NAUSEA: ICD-10-CM

## 2024-07-05 RX ORDER — TRAZODONE HYDROCHLORIDE 100 MG/1
100 TABLET ORAL NIGHTLY
Qty: 90 TABLET | Refills: 1 | Status: SHIPPED | OUTPATIENT
Start: 2024-07-05

## 2024-07-05 RX ORDER — AMIODARONE HYDROCHLORIDE 200 MG/1
200 TABLET ORAL DAILY
Qty: 90 TABLET | Refills: 1 | Status: SHIPPED | OUTPATIENT
Start: 2024-07-05

## 2024-07-05 RX ORDER — ONDANSETRON 4 MG/1
4 TABLET, FILM COATED ORAL DAILY PRN
Qty: 15 TABLET | Refills: 0 | OUTPATIENT
Start: 2024-07-05

## 2024-07-19 ENCOUNTER — APPOINTMENT (OUTPATIENT)
Dept: RADIOLOGY | Facility: HOSPITAL | Age: 84
End: 2024-07-19
Payer: MEDICARE

## 2024-07-31 ENCOUNTER — TELEPHONE (OUTPATIENT)
Dept: UROLOGY | Facility: CLINIC | Age: 84
End: 2024-07-31
Payer: MEDICARE

## 2024-07-31 ENCOUNTER — LAB (OUTPATIENT)
Dept: LAB | Facility: LAB | Age: 84
End: 2024-07-31
Payer: MEDICARE

## 2024-07-31 DIAGNOSIS — R11.0 NAUSEA: ICD-10-CM

## 2024-07-31 DIAGNOSIS — R39.9 UTI SYMPTOMS: ICD-10-CM

## 2024-07-31 DIAGNOSIS — I25.2 OLD MYOCARDIAL INFARCTION: ICD-10-CM

## 2024-07-31 PROCEDURE — 87086 URINE CULTURE/COLONY COUNT: CPT

## 2024-07-31 RX ORDER — ONDANSETRON 4 MG/1
4 TABLET, FILM COATED ORAL DAILY PRN
Qty: 15 TABLET | Refills: 0 | Status: SHIPPED | OUTPATIENT
Start: 2024-07-31

## 2024-07-31 RX ORDER — CLOPIDOGREL BISULFATE 75 MG/1
75 TABLET ORAL DAILY
Qty: 90 TABLET | Refills: 1 | Status: SHIPPED | OUTPATIENT
Start: 2024-07-31

## 2024-07-31 RX ORDER — NITROFURANTOIN 25; 75 MG/1; MG/1
100 CAPSULE ORAL 2 TIMES DAILY
Qty: 14 CAPSULE | Refills: 0 | Status: SHIPPED | OUTPATIENT
Start: 2024-07-31 | End: 2024-08-07

## 2024-07-31 NOTE — TELEPHONE ENCOUNTER
Received call from patient that he is having symptoms of UTI and would like to be started on an antibiotic. Instructed patient that Dr. Cain will empirically start him on Macrobid, but that he needs a urine culture prior to starting. Patient verbalized an understanding of plan. Order for urine culture was placed.

## 2024-08-01 ENCOUNTER — APPOINTMENT (OUTPATIENT)
Dept: CARDIOLOGY | Facility: HOSPITAL | Age: 84
End: 2024-08-01
Payer: MEDICARE

## 2024-08-01 LAB — BACTERIA UR CULT: ABNORMAL

## 2024-08-07 ENCOUNTER — APPOINTMENT (OUTPATIENT)
Dept: RADIOLOGY | Facility: HOSPITAL | Age: 84
End: 2024-08-07
Payer: MEDICARE

## 2024-08-08 DIAGNOSIS — N39.0 RECURRENT UTI: ICD-10-CM

## 2024-08-09 ENCOUNTER — LAB (OUTPATIENT)
Dept: LAB | Facility: LAB | Age: 84
End: 2024-08-09
Payer: MEDICARE

## 2024-08-09 DIAGNOSIS — N39.0 RECURRENT UTI: ICD-10-CM

## 2024-08-09 PROCEDURE — 87086 URINE CULTURE/COLONY COUNT: CPT

## 2024-08-10 LAB — BACTERIA UR CULT: ABNORMAL

## 2024-08-15 ENCOUNTER — OFFICE VISIT (OUTPATIENT)
Dept: CARDIOLOGY | Facility: HOSPITAL | Age: 84
End: 2024-08-15
Payer: MEDICARE

## 2024-08-15 VITALS
OXYGEN SATURATION: 90 % | SYSTOLIC BLOOD PRESSURE: 138 MMHG | BODY MASS INDEX: 23.72 KG/M2 | WEIGHT: 179 LBS | HEIGHT: 73 IN | DIASTOLIC BLOOD PRESSURE: 77 MMHG | HEART RATE: 72 BPM

## 2024-08-15 DIAGNOSIS — I48.0 PAROXYSMAL ATRIAL FIBRILLATION (MULTI): ICD-10-CM

## 2024-08-15 DIAGNOSIS — I50.20 ACC/AHA STAGE C SYSTOLIC HEART FAILURE (MULTI): Primary | ICD-10-CM

## 2024-08-15 DIAGNOSIS — I25.10 ASCVD (ARTERIOSCLEROTIC CARDIOVASCULAR DISEASE): ICD-10-CM

## 2024-08-15 DIAGNOSIS — I25.5 ISCHEMIC CARDIOMYOPATHY: ICD-10-CM

## 2024-08-15 PROCEDURE — 1159F MED LIST DOCD IN RCRD: CPT | Performed by: INTERNAL MEDICINE

## 2024-08-15 PROCEDURE — 99214 OFFICE O/P EST MOD 30 MIN: CPT | Performed by: INTERNAL MEDICINE

## 2024-08-15 PROCEDURE — 1160F RVW MEDS BY RX/DR IN RCRD: CPT | Performed by: INTERNAL MEDICINE

## 2024-08-15 PROCEDURE — 1126F AMNT PAIN NOTED NONE PRSNT: CPT | Performed by: INTERNAL MEDICINE

## 2024-08-15 PROCEDURE — G2211 COMPLEX E/M VISIT ADD ON: HCPCS | Performed by: INTERNAL MEDICINE

## 2024-08-15 PROCEDURE — 1036F TOBACCO NON-USER: CPT | Performed by: INTERNAL MEDICINE

## 2024-08-15 RX ORDER — FERROUS SULFATE 325(65) MG
325 TABLET ORAL
COMMUNITY

## 2024-08-15 RX ORDER — CARVEDILOL 6.25 MG/1
6.25 TABLET ORAL
Qty: 60 TABLET | Refills: 11 | Status: SHIPPED | OUTPATIENT
Start: 2024-08-15 | End: 2025-08-15

## 2024-08-15 ASSESSMENT — PAIN SCALES - GENERAL: PAINLEVEL: 0-NO PAIN

## 2024-08-15 NOTE — PROGRESS NOTES
[facility]  Primary Care Physician: Charles Bradford MD  Referring Provider/Cardiologist: n/a     Date of Visit: 08/15/2024  4:40 PM EDT  Location of visit: [facility]     HPI:   Mr. Comer is an 83M with a PMHx sig for CAD s/p PCI (LAD ; 10/2021), LFLG severe AS s/p BAV (10/2021) followed by TAVR (#34 EvolutR; 2/22/22), stage C systolic HF/mixed NICM-ICM/HFrEF with moderate to severe LV dysfunction s/p ICD, pAF on AAD and DOAC therapies, and BPH who returns to the Advanced Heart Failure clinic for ongoing evaluation and management of his cardiomyopathy.     Interval hx:   Repeat echo shows improving LVEF. He is working improving his ambulation.     Currently denies chest pain, palpitations, orthopnea, PND, or LE edema.     He reports exertional dyspnea when walking with a rollator after about a quarter of a mile.     Hospitalizations: Denies   Medication adherence: Reports adherence       PM/SHx:  CAD s/p PCI (LAD ; 10/2021), LFLG severe AS s/p BAV (10/2021) followed by TAVR (#34 EvolutR; 2/22/22), stage C systolic HF/mixed NICM-ICM/HFrEF with mild LV dysfunction s/p ICD, pAF on AAD and DOAC therapies, BPH, h/o prostate CA, s/p spinal canal neural stimulator, BPH s/p TURP    SocHx:   , lives with his wife in [place]  Denies smoking or illicits. Reports ETOH (glass of wine every night)    FamHx:   Denies CAD/cardiomyopathy        Current Outpatient Medications   Medication Sig Dispense Refill    amiodarone (Pacerone) 200 mg tablet TAKE 1 TABLET DAILY 90 tablet 1    atorvastatin (Lipitor) 40 mg tablet TAKE 1 TABLET BY MOUTH EVERY DAY 90 tablet 3    azelastine (Astelin) 137 mcg (0.1 %) nasal spray USE 2 SPRAYS INTO EACH NOSTRIL TWICE A DAY 90 mL 1    carvedilol (Coreg) 3.125 mg tablet Take 1 tablet (3.125 mg) by mouth 2 times a day with meals. 180 tablet 0    cetirizine (ZyrTEC) 10 mg tablet Take 1 tablet (10 mg) by mouth once daily.       "clopidogrel (Plavix) 75 mg tablet TAKE 1 TABLET BY MOUTH ONCE DAILY. 90 tablet 1    dupilumab (Dupixent) 300 mg/2 mL injection Inject 2 mL (300 mg) under the skin 1 time.      Eliquis 5 mg tablet TAKE 1 TABLET BY MOUTH EVERY 12 HOURS 60 tablet 11    ferrous sulfate, 325 mg ferrous sulfate, (iron) tablet Take 1 tablet by mouth once daily with breakfast.      fish oil concentrate (Omega-3) 120-180 mg capsule Take 1 capsule (1 g) by mouth once daily.      Jardiance 10 mg TAKE 1 TABLET BY MOUTH EVERY DAY 90 tablet 3    Myrbetriq 50 mg tablet extended release 24 hr 24 hr tablet Take 1 tablet (50 mg) by mouth once daily. 90 tablet 3    nitroglycerin (Nitrostat) 0.4 mg SL tablet Place 1 tablet (0.4 mg) under the tongue every 5 minutes if needed. UP TO 3 DOSES AS NEEDED FOR CHEST PAIN.      ondansetron (Zofran) 4 mg tablet TAKE 1 TABLET BY MOUTH EVERY DAY AS NEEDED 15 tablet 0    sacubitriL-valsartan (Entresto) 49-51 mg tablet Take 1 tablet by mouth 2 times a day. 60 tablet 11    sildenafil (Viagra) 100 mg tablet Take 1 tablet (100 mg) by mouth if needed.      spironolactone (Aldactone) 25 mg tablet TAKE 1 TABLET DAILY 90 tablet 2    tacrolimus (Protopic) 0.1 % ointment Apply 1 Application topically 2 times a day.      traZODone (Desyrel) 100 mg tablet TAKE 1 TABLET (100 MG) BY MOUTH ONCE DAILY AT BEDTIME. 90 tablet 1    traZODone (Desyrel) 100 mg tablet Take 1 tablet (100 mg) by mouth once daily at bedtime. 30 tablet 5    triamcinolone (Kenalog) 0.1 % cream Apply 1 Application topically.      vit C/E/Zn/coppr/lutein/zeaxan (PRESERVISION AREDS-2 ORAL) Take 2 capsules by mouth once daily.      furosemide (Lasix) 20 mg tablet Take 1 tablet (20 mg) by mouth once daily as needed.       No current facility-administered medications for this visit.       No Known Allergies      Visit Vitals  /77 (BP Location: Left arm, Patient Position: Sitting)   Pulse 72   Ht 1.854 m (6' 1\")   Wt 81.2 kg (179 lb)   SpO2 90%   BMI 23.62 " kg/m²   Smoking Status Never   BSA 2.04 m²        Physical Exam:  On exam Mr. Comer appears his stated age, is alert and oriented x3, and in no acute distress. His sclera are anicteric and his oropharynx has moist mucous membranes. His neck is supple and without thyromegaly. The JVP is ~6 cm of water above the right atrium. His cardiac exam has regular rhythm, normal S1, S2. No S3/4. There are no murmurs. His lungs are clear to auscultation bilaterally and there is no dullness to percussion. His abdomen is soft, nontender with normoactive bowel sounds. There is no HJR. The extremities are warm and without edema. The skin is dry. There is no rash present. The distal pulses are 2+ in all four extremities. His mood and affect are appropriate for todays encounter.       Cardiac Labs/Diagnostics:    Lab Results   Component Value Date    CREATININE 1.01 06/21/2024    BUN 15 06/21/2024     06/21/2024    K 5.1 06/21/2024    CL 97 (L) 06/21/2024    CO2 32 06/21/2024        Recent Labs     02/01/24  1254 02/06/23  1053   CHOL 143 182   LDLF  --  83   LDLCALC 50  --    HDL 64.1 64.6   TRIG 147 171*       Recent Labs     04/22/24  1259 02/01/24  1254 02/06/23  1053 04/28/22  1201 12/06/21  1550   * 138* 154* 442* 3,050*       Echo (6/5/24):  1. Left ventricular systolic function is mildly decreased with a 40-45% estimated ejection fraction.  2. Spectral Doppler shows an impaired relaxation pattern of left ventricular diastolic filling.  3. There is a transcatheter aortic valve replacement.    ECG (5/11/23):  Sinus rhythm (HR 72), RBBB, LAFB, LVH    Echo (2/6/23):  1. Left ventricular systolic function is moderately to severely decreased with a 30-35% estimated ejection fraction.  2. There is a transcatheter aortic valve replacement.  3. Compared with study from 3/22/2022, peak gradient of aortic valve is less (previously 15 mmHg).    Echo (3/22/22):  1. The left ventricular systolic function is moderately  decreased with a 30-35% estimated ejection fraction.  2. There is global hypokinesis of the left ventricle with minor regional variations.  3. Spectral Doppler shows an impaired relaxation pattern of left ventricular diastolic filling.  4. No left ventricular thrombus visualized.  5. The left atrium is severely dilated.  6. RVSP within normal limits.  7. There is a transcatheter aortic valve replacement.    ECG (2/23/22):  Sinus rhythm (HR 75) with PACs, LVH with repol       Impression/Plan:  Mr. Comer is an 83M with a PMHx sig for CAD s/p PCI (LAD ; 10/2021), LFLG severe AS s/p BAV (10/2021) followed by TAVR (#34 EvolutR; 2/22/22), stage C systolic HF/mixed NICM-ICM/HFrEF with mild LV dysfunction s/p ICD, pAF on AAD and DOAC therapies, and BPH who returns to the Advanced Heart Failure clinic for ongoing evaluation and management of his cardiomyopathy. At the current time he has functional class II symptoms and appears euvolemic on exam.     1) Stage C chronic systolic HF/mixed NICM-ICM/HFrEF with mild LV dysfunction (LVEF 30-35-->40-45%; 6/2024) s/p ICD  Improving LVEF with ongoing optimization of his GDMT. He now appears functional enough to be referred to cardiac rehab.   -increase carvedilol to 6.25 mg bid  -c/w entresto 49/51 mg bid, jardiance 10 mg daily, spironolactone 25 mg daily, furosemide 20 mg daily/prn  -refer to cardiac rehab    2) LFLG severe AS s/p BAV (10/2021) followed by TAVR (#34 EvolutR; 2/22/22)   Stable on most recent echo.     3) pAF on AAD and DOAC therapies  Having issues related to bruising, but no significant bleeding. TFT/LFTs/CXR stable; poor effort on PFTs.    -c/w amiodarone 200 mg daily, eliquis 5 mg bid    4) CAD s/p PCI (LAD ; 10/2021)  Lipids (2/1/24): tChol 143, HDL 64, LDL 50, trigs 147  Denies angina.   -c/w clopidogrel, sans ASA given DOAC use  -c/w atorvastatin 40 mg daily, BB      F/U: 6 months at /[facility][note 3]        ____________________________________________________________  Calvin Calderon DO  Section of Advanced Heart Failure and Cardiac Transplantation  Division of Cardiovascular Medicine  [facility]  [facility]

## 2024-08-15 NOTE — PATIENT INSTRUCTIONS
It was a pleasure seeing you today. Please contact myself or my team with any questions.     To reach Dr. Calderon' office please call [phone] ([place]).   Fax: [phone]   To schedule an appointment call [phone]     If you have any questions or need cardiac medication refills, please call the Heart Failure office at [phone], option 6. You may also contact the  Heart Failure Nursing team via email at [email or web address] (Please include your name and date of birth).        1) Increase your carvedilol to 6.25 mg twice a day  2) We will refer you to cardiac rehab  3) Follow up in 6 months at /[facility]

## 2024-08-19 ENCOUNTER — LAB (OUTPATIENT)
Dept: LAB | Facility: LAB | Age: 84
End: 2024-08-19
Payer: MEDICARE

## 2024-08-19 DIAGNOSIS — D64.9 ANEMIA, UNSPECIFIED TYPE: ICD-10-CM

## 2024-08-19 DIAGNOSIS — M54.16 LUMBAR RADICULOPATHY: ICD-10-CM

## 2024-08-19 LAB
BASOPHILS # BLD AUTO: 0.06 X10*3/UL (ref 0–0.1)
BASOPHILS NFR BLD AUTO: 0.5 %
EOSINOPHIL # BLD AUTO: 0.21 X10*3/UL (ref 0–0.4)
EOSINOPHIL NFR BLD AUTO: 1.9 %
ERYTHROCYTE [DISTWIDTH] IN BLOOD BY AUTOMATED COUNT: 17 % (ref 11.5–14.5)
HCT VFR BLD AUTO: 42.2 % (ref 41–52)
HGB BLD-MCNC: 13 G/DL (ref 13.5–17.5)
IMM GRANULOCYTES # BLD AUTO: 0.06 X10*3/UL (ref 0–0.5)
IMM GRANULOCYTES NFR BLD AUTO: 0.5 % (ref 0–0.9)
IRON SATN MFR SERPL: 14 % (ref 25–45)
IRON SERPL-MCNC: 59 UG/DL (ref 35–150)
LYMPHOCYTES # BLD AUTO: 1.35 X10*3/UL (ref 0.8–3)
LYMPHOCYTES NFR BLD AUTO: 12.4 %
MCH RBC QN AUTO: 25.5 PG (ref 26–34)
MCHC RBC AUTO-ENTMCNC: 30.8 G/DL (ref 32–36)
MCV RBC AUTO: 83 FL (ref 80–100)
MONOCYTES # BLD AUTO: 0.93 X10*3/UL (ref 0.05–0.8)
MONOCYTES NFR BLD AUTO: 8.5 %
NEUTROPHILS # BLD AUTO: 8.32 X10*3/UL (ref 1.6–5.5)
NEUTROPHILS NFR BLD AUTO: 76.2 %
NRBC BLD-RTO: 0 /100 WBCS (ref 0–0)
PLATELET # BLD AUTO: 304 X10*3/UL (ref 150–450)
RBC # BLD AUTO: 5.1 X10*6/UL (ref 4.5–5.9)
TIBC SERPL-MCNC: 424 UG/DL (ref 240–445)
UIBC SERPL-MCNC: 365 UG/DL (ref 110–370)
WBC # BLD AUTO: 10.9 X10*3/UL (ref 4.4–11.3)

## 2024-08-19 PROCEDURE — 83521 IG LIGHT CHAINS FREE EACH: CPT

## 2024-08-19 PROCEDURE — 83550 IRON BINDING TEST: CPT

## 2024-08-19 PROCEDURE — 84466 ASSAY OF TRANSFERRIN: CPT

## 2024-08-19 PROCEDURE — 36415 COLL VENOUS BLD VENIPUNCTURE: CPT

## 2024-08-19 PROCEDURE — 82728 ASSAY OF FERRITIN: CPT

## 2024-08-19 PROCEDURE — 85025 COMPLETE CBC W/AUTO DIFF WBC: CPT

## 2024-08-19 PROCEDURE — 83540 ASSAY OF IRON: CPT

## 2024-08-20 LAB
FERRITIN SERPL-MCNC: 29 NG/ML (ref 20–300)
KAPPA LC SERPL-MCNC: 4.65 MG/DL (ref 0.33–1.94)
KAPPA LC/LAMBDA SER: 1.7 {RATIO} (ref 0.26–1.65)
LAMBDA LC SERPL-MCNC: 2.73 MG/DL (ref 0.57–2.63)
TRANSFERRIN SERPL-MCNC: 328 MG/DL (ref 200–360)

## 2024-08-21 DIAGNOSIS — J34.89 OTHER SPECIFIED DISORDERS OF NOSE AND NASAL SINUSES: ICD-10-CM

## 2024-08-22 RX ORDER — AZELASTINE 1 MG/ML
2 SPRAY, METERED NASAL 2 TIMES DAILY
Qty: 90 ML | Refills: 1 | Status: SHIPPED | OUTPATIENT
Start: 2024-08-22

## 2024-08-26 ENCOUNTER — TELEPHONE (OUTPATIENT)
Dept: UROLOGY | Facility: CLINIC | Age: 84
End: 2024-08-26
Payer: MEDICARE

## 2024-08-26 DIAGNOSIS — R11.0 NAUSEA: ICD-10-CM

## 2024-08-26 RX ORDER — ONDANSETRON 4 MG/1
4 TABLET, FILM COATED ORAL DAILY PRN
Qty: 15 TABLET | Refills: 0 | Status: SHIPPED | OUTPATIENT
Start: 2024-08-26

## 2024-08-26 NOTE — TELEPHONE ENCOUNTER
Patient inquiring about bladder botox and penile implants as he is concerned his penis shrinkage may be causing him to have trouble urinating.

## 2024-08-27 ENCOUNTER — APPOINTMENT (OUTPATIENT)
Dept: GASTROENTEROLOGY | Facility: HOSPITAL | Age: 84
End: 2024-08-27
Payer: MEDICARE

## 2024-08-29 ENCOUNTER — TELEPHONE (OUTPATIENT)
Dept: OTOLARYNGOLOGY | Facility: CLINIC | Age: 84
End: 2024-08-29
Payer: MEDICARE

## 2024-08-29 DIAGNOSIS — J34.2 NASAL SEPTAL DEVIATION: Primary | ICD-10-CM

## 2024-08-29 DIAGNOSIS — J34.89 NASAL AND SINUS DISCHARGE: ICD-10-CM

## 2024-08-29 RX ORDER — MINERAL OIL
180 ENEMA (ML) RECTAL DAILY PRN
Qty: 30 TABLET | Refills: 0 | Status: SHIPPED | OUTPATIENT
Start: 2024-08-29 | End: 2025-08-29

## 2024-08-29 NOTE — TELEPHONE ENCOUNTER
Patient calling with concerns of increased nasal drainage (clear, anterior). He is using benadryl, which is helping. He has not been using flonase currently. Advised that if drainage becomes colored and lasts more than 7 days, to call my office for consideration of antibiotic therapy. Recommended he discontinue benadryl and begin allegra and fluticasone. Patient verbalizes understanding instructions and agrees with established plan of care.

## 2024-09-08 DIAGNOSIS — N40.1 BENIGN PROSTATIC HYPERPLASIA WITH LOWER URINARY TRACT SYMPTOMS, SYMPTOM DETAILS UNSPECIFIED: ICD-10-CM

## 2024-09-09 ENCOUNTER — TELEPHONE (OUTPATIENT)
Dept: OTOLARYNGOLOGY | Facility: CLINIC | Age: 84
End: 2024-09-09
Payer: MEDICARE

## 2024-09-09 DIAGNOSIS — J34.89 NASAL AND SINUS DISCHARGE: ICD-10-CM

## 2024-09-09 RX ORDER — MIRABEGRON 50 MG/1
50 TABLET, FILM COATED, EXTENDED RELEASE ORAL DAILY
Qty: 60 TABLET | Refills: 5 | Status: SHIPPED | OUTPATIENT
Start: 2024-09-09

## 2024-09-09 NOTE — TELEPHONE ENCOUNTER
Patient contacted the office stating he is having right sided clear nasal drainage, right eye watering, and his right eye is swollen. He denies pain or pressure. He is taking Allegra BID and Flonase in the morning. Discussed with HU Cool who placed a verbal order for Doxycyline 100 mg PO BID x 10 days. Medication education provided to patient, advised patient to take antibiotic after meals, avoid dairy products 2 hours before and after administration, and encouraged a daily probiotic while on antibiotic. Patient educated on antibiotic causing sunlight sensitivity to skin and encouraged SPF use. Advised patient to discontinue medications if adverse effects. Patient agrees to plan of care and prescription sent to pharmacy.

## 2024-09-10 ENCOUNTER — APPOINTMENT (OUTPATIENT)
Dept: RADIOLOGY | Facility: HOSPITAL | Age: 84
End: 2024-09-10
Payer: MEDICARE

## 2024-09-10 RX ORDER — DOXYCYCLINE 100 MG/1
100 TABLET ORAL 2 TIMES DAILY
Qty: 20 TABLET | Refills: 0 | Status: SHIPPED | OUTPATIENT
Start: 2024-09-10 | End: 2024-09-20

## 2024-09-13 DIAGNOSIS — J34.89 NASAL AND SINUS DISCHARGE: ICD-10-CM

## 2024-09-13 DIAGNOSIS — J34.2 NASAL SEPTAL DEVIATION: ICD-10-CM

## 2024-09-13 RX ORDER — MINERAL OIL
180 ENEMA (ML) RECTAL DAILY PRN
Qty: 30 TABLET | Refills: 0 | Status: SHIPPED | OUTPATIENT
Start: 2024-09-13 | End: 2025-09-13

## 2024-09-17 ENCOUNTER — APPOINTMENT (OUTPATIENT)
Dept: OTOLARYNGOLOGY | Facility: CLINIC | Age: 84
End: 2024-09-17
Payer: MEDICARE

## 2024-09-17 VITALS — WEIGHT: 179 LBS | HEIGHT: 73 IN | BODY MASS INDEX: 23.72 KG/M2 | TEMPERATURE: 97.4 F

## 2024-09-17 DIAGNOSIS — J34.89 NASAL LESION: ICD-10-CM

## 2024-09-17 DIAGNOSIS — J34.89 NASAL AND SINUS DISCHARGE: Primary | ICD-10-CM

## 2024-09-17 DIAGNOSIS — J32.2 CHRONIC ETHMOIDAL SINUSITIS: ICD-10-CM

## 2024-09-17 DIAGNOSIS — J34.2 NASAL SEPTAL DEVIATION: ICD-10-CM

## 2024-09-17 DIAGNOSIS — J31.0 CHRONIC RHINITIS: ICD-10-CM

## 2024-09-17 PROCEDURE — 1160F RVW MEDS BY RX/DR IN RCRD: CPT | Performed by: NURSE PRACTITIONER

## 2024-09-17 PROCEDURE — 1159F MED LIST DOCD IN RCRD: CPT | Performed by: NURSE PRACTITIONER

## 2024-09-17 PROCEDURE — 1036F TOBACCO NON-USER: CPT | Performed by: NURSE PRACTITIONER

## 2024-09-17 PROCEDURE — 31231 NASAL ENDOSCOPY DX: CPT | Performed by: NURSE PRACTITIONER

## 2024-09-17 PROCEDURE — 99213 OFFICE O/P EST LOW 20 MIN: CPT | Performed by: NURSE PRACTITIONER

## 2024-09-17 RX ORDER — IPRATROPIUM BROMIDE 21 UG/1
2 SPRAY, METERED NASAL EVERY 12 HOURS
Qty: 30 ML | Refills: 3 | Status: SHIPPED | OUTPATIENT
Start: 2024-09-17 | End: 2025-09-17

## 2024-09-17 NOTE — PROGRESS NOTES
"Subjective   Patient ID: Greg Comer \"Omari" is a 83 y.o. male who presents for No chief complaint on file..  HPI    9/17/24- Feels like he has a sinus infection today that began three weeks ago. He endorses right eye tearing, clear anterior drainage only from his right nostril, and PND. He has been taking the Doxycycline and notes some improvement. He continues to use Azelastine two sprays BID and Flonase one spray twice daily. He denies taking any OTC antihistamines.  He denies facial pain / pressure, nasal blockage, foul odor in his nose, discolored drainage, loss of taste or smell. He denies any vision changes.    I personally reviewed the patients MRI scan images and results. I discussed the results personally with the patient. The following findings were discussed: 4/16/24: MRI Brain: Septum midline. Moderate ethmoid mucosal thickening. Otherwise, paranasal sinuses appear clear.     Review of Systems  Review of systems is negative for constitutional, ophthalmological, cardiac, pulmonary, renal, gastrointestinal, musculoskeletal, mental health, endocrine, or neurologic disorders (except as listed in the HPI, PMH, and Problem List).     Objective   Physical Exam  CONSTITUTIONAL: Patient appears well developed and well nourished.   GENERAL: this is a healthy appearing male who appears stated age. The patient is alert and appropriately verbally conversant without hoarseness. This patient is in no apparent distress.   FACE: The face was inspected and no cutaneous masses or lesions were visualized. There was no erythema or edema noted. Facial movement was symmetric. No skin lesions were detected.   EYES: Extra-ocular muscle function was intact. No nystagmus was observed. Pupils were equal.   NOSE: Examination of the nose revealed the nasal dorsum to be midline. Intranasal exam reveals the septum is left deviated. The inferior turbinates were hyperemic. No masses, polyps, mucopus, or other lesion on anterior " rhinoscopy. See below procedure note as applicable for further exam.    EARS: Right EAC with soft cerumen, wet. Left normal with scarring.   RESPIRATORY: Normal inspiration and expiration and chest wall expansion, no use of accessory muscles to breathe, no stridor.  NEUROLOGICAL: Patient is ambulatory without assist. Mentation is clear. Answering questions appropriately.     Nasal / sinus endoscopy    Date/Time: 9/17/2024 1:23 PM    Performed by: ISABEL Dunlap  Authorized by: ISABEL Dunlap    Consent:     Consent obtained:  Verbal    Consent given by:  Patient    Risks discussed:  Bleeding, infection and pain    Alternatives discussed:  No treatment, observation and delayed treatment  Procedure details:     Indications: assessment of airway and sino-nasal symptoms      Medication:  Afrin and Miles-Synephrine 2%    Instrument: flexible fiberoptic nasal endoscope      Scope location: bilateral nare    Post-procedure details:     Patient tolerance of procedure:  Tolerated well, no immediate complications  Comments:      Findings: After topical decongestion with decongestant and anesthetic spray, nasal endoscopy was performed using an endoscope. The septum was deviated to the left. The inferior turbinates were hyperemic. The middle turbinates appeared healthy, the middle meatus is free of purulence, masses, lesions or polyps. On the right middle turbinate, continued erythematous lesion along the anterior aspect of the turbinate. The superior meatus and sphenoethmoid recess are clear bilaterally. The nasal passageway is patent. The nasopharynx was clear. There were no complications and the patient tolerated the procedure well.       Assessment/Plan   ASSESSMENT:  Patient is an 81yo M with history of chronic rhinitis with temporary improvement with ipratropium bromide. Rec. trial of azelastine prior to consideration of procedures.  12/5/23- Continued stable lesion along the right middle  turbinate. Doing well with azelastine.   6/26/24- Doing very well with azelastine. Will continue. Will see otology for ears (build up of wax and debrox).   9/17/24- Presenting with increased right eye tearing, right nasal drainage. No infection. Will see PCP and ophthalmology within a few days.     PLAN:  1. Nasal endoscopy: Stable lesion on anterior aspect of the right middle turbinate, hyperemia of inferior turbinate, left dev., no infection  2. I personally reviewed the patients MRI scan images and results. I discussed the results personally with the patient. The following findings were discussed: 4/16/24: MRI Brain: Septum midline. Moderate ethmoid mucosal thickening. Otherwise, paranasal sinuses appear clear.   3. I recommended that the patient begin ipratropium bromide. Advised to discontinue for adverse effects.  4. Today, we discussed his epiphora of the right eye. Concerned that this is related to a primary eye complaint, potentially dry eyes. He has an appointment with his ophthalmologist later this week to discuss.   5. If he continues doing well with the above therapy, he will follow up in 12 months or sooner if needed.     All questions were answered and patient agrees with established plan of care.

## 2024-09-17 NOTE — PATIENT INSTRUCTIONS
Today you were evaluated by Daphnie Cool CNP.    Please follow-up in 6 months or sooner if needed. If you have any questions or concerns, please contact my office at [phone].     Discontinue doxycycline.    Continue allegra.     Resume ipratropium bromide.     Hold azelastine.

## 2024-09-17 NOTE — LETTER
"September 17, 2024     Charles Bradford MD  [address] [place] [address] Dr Radha Rojas [place], [facility] [address]  [facility] [address]    Patient: Timothy Comer   YOB: 1940   Date of Visit: 9/17/2024       Dear Dr. Charles Bradford MD:    Thank you for referring Timothy Comer to me for evaluation. Below are my notes for this consultation.  If you have questions, please do not hesitate to call me. I look forward to following your patient along with you.       Sincerely,     Daphnie Cool, APRN-CNP      CC: No Recipients  ______________________________________________________________________________________    Subjective  Patient ID: Greg Comer \"Omari" is a 83 y.o. male who presents for No chief complaint on file..  HPI    9/17/24- Feels like he has a sinus infection today that began three weeks ago. He endorses right eye tearing, clear anterior drainage only from his right nostril, and PND. He has been taking the Doxycycline and notes some improvement. He continues to use Azelastine two sprays BID and Flonase one spray twice daily. He denies taking any OTC antihistamines.  He denies facial pain / pressure, nasal blockage, foul odor in his nose, discolored drainage, loss of taste or smell. He denies any vision changes.    I personally reviewed the patients MRI scan images and results. I discussed the results personally with the patient. The following findings were discussed: 4/16/24: MRI Brain: Septum midline. Moderate ethmoid mucosal thickening. Otherwise, paranasal sinuses appear clear.     Review of Systems  Review of systems is negative for constitutional, ophthalmological, cardiac, pulmonary, renal, gastrointestinal, musculoskeletal, mental health, endocrine, or neurologic disorders (except as listed in the HPI, PMH, and Problem List).     Objective  Physical Exam  CONSTITUTIONAL: Patient appears well developed and well nourished.   GENERAL: this is a healthy appearing male who appears stated age. The patient is alert " and appropriately verbally conversant without hoarseness. This patient is in no apparent distress.   FACE: The face was inspected and no cutaneous masses or lesions were visualized. There was no erythema or edema noted. Facial movement was symmetric. No skin lesions were detected.   EYES: Extra-ocular muscle function was intact. No nystagmus was observed. Pupils were equal.   NOSE: Examination of the nose revealed the nasal dorsum to be midline. Intranasal exam reveals the septum is left deviated. The inferior turbinates were hyperemic. No masses, polyps, mucopus, or other lesion on anterior rhinoscopy. See below procedure note as applicable for further exam.    EARS: Right EAC with soft cerumen, wet. Left normal with scarring.   RESPIRATORY: Normal inspiration and expiration and chest wall expansion, no use of accessory muscles to breathe, no stridor.  NEUROLOGICAL: Patient is ambulatory without assist. Mentation is clear. Answering questions appropriately.     Nasal / sinus endoscopy    Date/Time: 9/17/2024 1:23 PM    Performed by: ISABEL Dunlap  Authorized by: ISABEL Dunlap    Consent:     Consent obtained:  Verbal    Consent given by:  Patient    Risks discussed:  Bleeding, infection and pain    Alternatives discussed:  No treatment, observation and delayed treatment  Procedure details:     Indications: assessment of airway and sino-nasal symptoms      Medication:  Afrin and Miles-Synephrine 2%    Instrument: flexible fiberoptic nasal endoscope      Scope location: bilateral nare    Post-procedure details:     Patient tolerance of procedure:  Tolerated well, no immediate complications  Comments:      Findings: After topical decongestion with decongestant and anesthetic spray, nasal endoscopy was performed using an endoscope. The septum was deviated to the left. The inferior turbinates were hyperemic. The middle turbinates appeared healthy, the middle meatus is free of purulence,  masses, lesions or polyps. On the right middle turbinate, continued erythematous lesion along the anterior aspect of the turbinate. The superior meatus and sphenoethmoid recess are clear bilaterally. The nasal passageway is patent. The nasopharynx was clear. There were no complications and the patient tolerated the procedure well.       Assessment/Plan  ASSESSMENT:  Patient is an 81yo M with history of chronic rhinitis with temporary improvement with ipratropium bromide. Rec. trial of azelastine prior to consideration of procedures.  12/5/23- Continued stable lesion along the right middle turbinate. Doing well with azelastine.   6/26/24- Doing very well with azelastine. Will continue. Will see otology for ears (build up of wax and debrox).   9/17/24- Presenting with increased right eye tearing, right nasal drainage. No infection. Will see PCP and ophthalmology within a few days.     PLAN:  1. Nasal endoscopy: Stable lesion on anterior aspect of the right middle turbinate, hyperemia of inferior turbinate, left dev., no infection  2. I personally reviewed the patients MRI scan images and results. I discussed the results personally with the patient. The following findings were discussed: 4/16/24: MRI Brain: Septum midline. Moderate ethmoid mucosal thickening. Otherwise, paranasal sinuses appear clear.   3. I recommended that the patient begin ipratropium bromide. Advised to discontinue for adverse effects.  4. Today, we discussed his epiphora of the right eye. Concerned that this is related to a primary eye complaint, potentially dry eyes. He has an appointment with his ophthalmologist later this week to discuss.   5. If he continues doing well with the above therapy, he will follow up in 12 months or sooner if needed.     All questions were answered and patient agrees with established plan of care.

## 2024-09-18 ENCOUNTER — APPOINTMENT (OUTPATIENT)
Dept: PRIMARY CARE | Facility: CLINIC | Age: 84
End: 2024-09-18
Payer: MEDICARE

## 2024-09-18 VITALS
SYSTOLIC BLOOD PRESSURE: 110 MMHG | DIASTOLIC BLOOD PRESSURE: 60 MMHG | BODY MASS INDEX: 23.62 KG/M2 | OXYGEN SATURATION: 92 % | TEMPERATURE: 97.9 F | HEART RATE: 76 BPM | WEIGHT: 179 LBS

## 2024-09-18 DIAGNOSIS — D50.8 OTHER IRON DEFICIENCY ANEMIA: ICD-10-CM

## 2024-09-18 DIAGNOSIS — M51.36 DISC DEGENERATION, LUMBAR: Primary | ICD-10-CM

## 2024-09-18 DIAGNOSIS — I25.10 CAD IN NATIVE ARTERY: ICD-10-CM

## 2024-09-18 DIAGNOSIS — R73.09 ELEVATED GLUCOSE: ICD-10-CM

## 2024-09-18 PROCEDURE — 90662 IIV NO PRSV INCREASED AG IM: CPT | Performed by: INTERNAL MEDICINE

## 2024-09-18 PROCEDURE — 99214 OFFICE O/P EST MOD 30 MIN: CPT | Performed by: INTERNAL MEDICINE

## 2024-09-18 PROCEDURE — G0008 ADMIN INFLUENZA VIRUS VAC: HCPCS | Performed by: INTERNAL MEDICINE

## 2024-09-18 PROCEDURE — 1159F MED LIST DOCD IN RCRD: CPT | Performed by: INTERNAL MEDICINE

## 2024-09-18 NOTE — PROGRESS NOTES
"Chief Complaint:   Follow-up     History Of Present Illness:    Greg Comer \"Timothy\" is a 83 y.o. male with active medical problems outlined below who presents for blood pressure check.       INITIAL VISIT 2/13/24  Timothy has hx of CAD sp PC to LAD (10/21), severe aortic stenosis sp AVR followed by TAVR, HFrEF sp ICD placement, paroxysmal Afib, DM, HTN , HLD and BPH.  Last echo 2/6/23 demonstrated EF 30-35% with global hypokinesis.  Currently asymptomatic.  Hx of BPH sp TURP.  Recent cysto showed patent channel.  Urinary symptoms controlled.  Recently started dupixent for exzema and itch is much better.  Has history of normocytic anemia that corrected on labs 6/15/23.  A1C 6.0 on 9/27/23.  Has spinal cord stimulator - for back and leg pain, no longer necessary   Walking, weights daily  Good energy and good strength  UTD with eye exam   Colonoscopy has always been normal       INTERVAL HISTORY 4/17/24  Wants to work on exertional shortness of breath.  Has HFrEF, ICD, atrial fibrillation and sp TAVR.  Last echocardiogram 2/6/23 EF 30-35% with global hypokinesis.  No change since last visit, but gets OOB easily and wants to be more active.  Not having chest pain, palpitations or dizziness. No abdominal or ankle edema.  No trouble breathing in bed at night.   Recent workup for gait instability and peripheral neuropathy.   MRI completed 4/16/24 - no stroke, hemorrhage or mass.  Nonspecific volume loss and white matter changes.  Not suggestive of NPH.   Has lumbar DDD treated with spinal cord stimulator.  No longer needs the sitmulator, pain controlled.  Now having numbness in left leg and NCV ordered by neurology.   Wants to work with PT on numbness in left ankle   Dupixent working well - cleared up excema   Has taken sildenafil and would like a refill   Had some blood per rectum but that has cleared up - colonoscopy scheduled.     INTERVAL HISTORY 6/12/24  Recent workup for anemia showed Iron 41 and Ferritin 20.  " Hemoccult was positive and EGD and colonoscopy ordered.  Timothy is on the fence about having the testing done.  Recently experiencing increased shortness of breath.  Echocardiogram was completed and demonstrated ejection fraction 40 to 45%.  1 year ago his ejection fraction was 30 to 35%.  There were no new findings.  He has ongoing workup for pain and numbness in his left ankle.  EMG/nerve conduction studies are scheduled this month.  His symptoms are unchanged.  He tries to stay active and reports his breathing is improving overall.  He uses a rollator or cane for ambulation.  No change in urinary symptoms.      INTERVAL HISTORY 9/19/24  Lifting weights and walking daily - feeling well.  No chest pain and breathing at baseline.  No leg or ankle swelling.   Runny eyes and runny nose continue.  Met with ENT - scope/laryngoscopy normal.  On allegra and flonase and astelin added.   EMG suggestive of L5-S1 neuropathy with possibly superimposed polyeuropathy  MRI LS spine ordered   Walking with rollator and cane - mobility has improved over time.   Having intermittent nausea that responds to elin selzer.  Timothy has iron def anemia with heme pos stool.  After consulting with his family he has decided to move forward with EGD and colonoscopy.          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 (Multi)    RBBB (right bundle branch block with left anterior  fascicular block)    Rhinorrhea    S/P coronary artery stent placement    S/P TAVR (transcatheter aortic valve replacement)    Throat clearing    Urinary incontinence    Recurrent UTI    UTI symptoms    Right sided weakness    History of cardiac defibrillator placement    Hyperlipemia    Insulin resistance    Right bundle branch block    Controlled type 2 diabetes mellitus with hyperglycemia, without long-term current use of insulin (Multi)    Leg numbness    Shortness of breath    Vitamin D deficiency    Disc degeneration, lumbar    Elevated glucose       Current Outpatient Medications:     amiodarone (Pacerone) 200 mg tablet, TAKE 1 TABLET DAILY, Disp: 90 tablet, Rfl: 1    atorvastatin (Lipitor) 40 mg tablet, TAKE 1 TABLET BY MOUTH EVERY DAY, Disp: 90 tablet, Rfl: 3    azelastine (Astelin) 137 mcg (0.1 %) nasal spray, USE 2 SPRAYS INTO EACH NOSTRIL TWICE A DAY, Disp: 90 mL, Rfl: 1    carvedilol (Coreg) 6.25 mg tablet, Take 1 tablet (6.25 mg) by mouth 2 times daily (morning and late afternoon)., Disp: 60 tablet, Rfl: 11    cetirizine (ZyrTEC) 10 mg tablet, Take 1 tablet (10 mg) by mouth once daily., Disp: , Rfl:     clopidogrel (Plavix) 75 mg tablet, TAKE 1 TABLET BY MOUTH ONCE DAILY., Disp: 90 tablet, Rfl: 1    doxycycline (Adoxa) 100 mg tablet, Take 1 tablet (100 mg) by mouth 2 times a day for 10 days. Take with food twice daily. Take the full course of the medication. Be sure to wear a SPF or keep covered in the sun., Disp: 20 tablet, Rfl: 0    dupilumab (Dupixent) 300 mg/2 mL injection, Inject 2 mL (300 mg) under the skin 1 time., Disp: , Rfl:     ferrous sulfate, 325 mg ferrous sulfate, (iron) tablet, Take 1 tablet (325 mg) by mouth once daily with breakfast., Disp: , Rfl:     fexofenadine (Allegra) 180 mg tablet, TAKE 1 TABLET (180 MG) BY MOUTH ONCE DAILY AS NEEDED (ALLERGY SYMPTOMS)., Disp: 30 tablet, Rfl: 0    fish oil concentrate (Omega-3) 120-180 mg capsule, Take 1 capsule (1 g) by mouth once daily.,  Disp: , Rfl:     furosemide (Lasix) 20 mg tablet, Take 1 tablet (20 mg) by mouth once daily as needed., Disp: , Rfl:     ipratropium (Atrovent) 21 mcg (0.03 %) nasal spray, Administer 2 sprays into each nostril every 12 hours., Disp: 30 mL, Rfl: 3    Jardiance 10 mg, TAKE 1 TABLET BY MOUTH EVERY DAY, Disp: 90 tablet, Rfl: 3    Myrbetriq 50 mg tablet extended release 24 hr 24 hr tablet, TAKE 1 TABLET BY MOUTH EVERY DAY, Disp: 60 tablet, Rfl: 5    nitroglycerin (Nitrostat) 0.4 mg SL tablet, Place 1 tablet (0.4 mg) under the tongue every 5 minutes if needed. UP TO 3 DOSES AS NEEDED FOR CHEST PAIN., Disp: , Rfl:     ondansetron (Zofran) 4 mg tablet, TAKE 1 TABLET BY MOUTH EVERY DAY AS NEEDED, Disp: 15 tablet, Rfl: 0    sacubitriL-valsartan (Entresto) 49-51 mg tablet, Take 1 tablet by mouth 2 times a day., Disp: 60 tablet, Rfl: 11    sildenafil (Viagra) 100 mg tablet, Take 1 tablet (100 mg) by mouth if needed., Disp: , Rfl:     spironolactone (Aldactone) 25 mg tablet, TAKE 1 TABLET DAILY, Disp: 90 tablet, Rfl: 2    tacrolimus (Protopic) 0.1 % ointment, Apply 1 Application topically 2 times a day., Disp: , Rfl:     traZODone (Desyrel) 100 mg tablet, TAKE 1 TABLET (100 MG) BY MOUTH ONCE DAILY AT BEDTIME., Disp: 90 tablet, Rfl: 1    traZODone (Desyrel) 100 mg tablet, Take 1 tablet (100 mg) by mouth once daily at bedtime., Disp: 30 tablet, Rfl: 5    triamcinolone (Kenalog) 0.1 % cream, Apply 1 Application topically., Disp: , Rfl:     vit C/E/Zn/coppr/lutein/zeaxan (PRESERVISION AREDS-2 ORAL), Take 2 capsules by mouth once daily., Disp: , Rfl:     Eliquis 5 mg tablet, TAKE 1 TABLET BY MOUTH EVERY 12 HOURS, Disp: 60 tablet, Rfl: 11  Patient has no known allergies.  Social History     Tobacco Use    Smoking status: Never    Smokeless tobacco: Never   Vaping Use    Vaping status: Never Used   Substance Use Topics    Alcohol use: Not Currently     Alcohol/week: 7.0 standard drinks of alcohol     Types: 7 Standard drinks or  equivalent per week     Comment: one drink per day - only one    Drug use: Not Currently         All pertinent aspects of medical and surgical history were reviewed and updated in chart    Review of Systems   Pertinent positives in review of systems outlined above.  Complete ROS otherwise negative.          Last Recorded Vitals:  No data found.         Physical Exam  HENT:      Mouth/Throat:      Pharynx: Oropharynx is clear.   Eyes:      Extraocular Movements: Extraocular movements intact.      Conjunctiva/sclera: Conjunctivae normal.      Pupils: Pupils are equal, round, and reactive to light.   Cardiovascular:      Rate and Rhythm: Normal rate and regular rhythm.      Pulses: Normal pulses.      Heart sounds: Normal heart sounds.   Pulmonary:      Effort: Pulmonary effort is normal.      Breath sounds: Normal breath sounds.   Musculoskeletal:      Right lower leg: No edema.      Left lower leg: No edema.             Assessment/Plan   Problem List Items Addressed This Visit       Anemia    Relevant Orders    Comprehensive Metabolic Panel    CBC and Auto Differential    Ferritin    Iron and TIBC    Transferrin    CAD in native artery     Currently asymptomatic.  Echocardiogram on 6/5/2024 demonstrated improved ejection fraction to 40 to 45%.  To continue with aspirin, statin and guideline directed medical therapy for decreased ejection fraction         Relevant Orders    Comprehensive Metabolic Panel    Lipid Panel    Disc degeneration, lumbar - Primary     MRI scheduled.  Nerve conduction study suggest L5-S1 radiculopathy.         Relevant Orders    Power mobility device    Comprehensive Metabolic Panel    Elevated glucose     Timothy has increased his level of physical activity, and he is working to improve his diet.  A fasting blood sugar and A1c will be checked prior to his next visit.         Relevant Orders    Comprehensive Metabolic Panel    Hemoglobin A1C       A total of 30 minutes was spent reviewing the  chart and recent testing and discussing plan of care.         Charles Bradford MD

## 2024-09-20 PROBLEM — R73.09 ELEVATED GLUCOSE: Status: ACTIVE | Noted: 2024-09-20

## 2024-09-20 PROBLEM — M51.36 DISC DEGENERATION, LUMBAR: Status: ACTIVE | Noted: 2024-09-20

## 2024-09-20 PROBLEM — M51.369 DISC DEGENERATION, LUMBAR: Status: ACTIVE | Noted: 2024-09-20

## 2024-09-20 NOTE — ASSESSMENT & PLAN NOTE
Currently asymptomatic.  Echocardiogram on 6/5/2024 demonstrated improved ejection fraction to 40 to 45%.  To continue with aspirin, statin and guideline directed medical therapy for decreased ejection fraction

## 2024-09-20 NOTE — ASSESSMENT & PLAN NOTE
Timothy has increased his level of physical activity, and he is working to improve his diet.  A fasting blood sugar and A1c will be checked prior to his next visit.

## 2024-09-24 DIAGNOSIS — J34.89 NASAL AND SINUS DISCHARGE: ICD-10-CM

## 2024-09-24 DIAGNOSIS — J34.2 NASAL SEPTAL DEVIATION: ICD-10-CM

## 2024-09-24 RX ORDER — MINERAL OIL
180 ENEMA (ML) RECTAL DAILY PRN
Qty: 30 TABLET | Refills: 2 | Status: SHIPPED | OUTPATIENT
Start: 2024-09-24 | End: 2025-09-24

## 2024-09-24 NOTE — TELEPHONE ENCOUNTER
Patient calling requesting refills for allegra. He does have improvement with use of the allegra and flonase have provided some benefit. He continues with discomfort and redness around his right eye. He is meeting with his eye doctor on Monday. Recommended he continue with above management and if anything worsens, to be evaluated in the urgent care. He verbalizes understanding instructions and agrees with established plan of care.

## 2024-09-25 ENCOUNTER — APPOINTMENT (OUTPATIENT)
Dept: UROLOGY | Facility: CLINIC | Age: 84
End: 2024-09-25
Payer: MEDICARE

## 2024-09-30 ENCOUNTER — APPOINTMENT (OUTPATIENT)
Dept: OPHTHALMOLOGY | Facility: CLINIC | Age: 84
End: 2024-09-30
Payer: MEDICARE

## 2024-09-30 DIAGNOSIS — H52.4 PRESBYOPIA: ICD-10-CM

## 2024-09-30 DIAGNOSIS — H52.11 MYOPIA OF RIGHT EYE: ICD-10-CM

## 2024-09-30 DIAGNOSIS — H52.02 HYPERMETROPIA OF LEFT EYE: Primary | ICD-10-CM

## 2024-09-30 DIAGNOSIS — H35.3132 INTERMEDIATE STAGE DRY AGE-RELATED MACULAR DEGENERATION OF BOTH EYES: ICD-10-CM

## 2024-09-30 DIAGNOSIS — H52.223 REGULAR ASTIGMATISM OF BOTH EYES: ICD-10-CM

## 2024-09-30 DIAGNOSIS — Z96.1 PSEUDOPHAKIA: ICD-10-CM

## 2024-09-30 PROCEDURE — 92015 DETERMINE REFRACTIVE STATE: CPT | Performed by: OPTOMETRIST

## 2024-09-30 PROCEDURE — 92134 CPTRZ OPH DX IMG PST SGM RTA: CPT | Performed by: OPTOMETRIST

## 2024-09-30 PROCEDURE — 92004 COMPRE OPH EXAM NEW PT 1/>: CPT | Performed by: OPTOMETRIST

## 2024-09-30 ASSESSMENT — REFRACTION_MANIFEST
OS_AXIS: 090
OS_SPHERE: +0.50
OS_ADD: +2.50
OD_SPHERE: -1.00
OD_CYLINDER: +0.75
METHOD_AUTOREFRACTION: 1
OS_CYLINDER: -3.00
OD_CYLINDER: -0.75
OD_AXIS: 177
OD_SPHERE: -1.25
OS_AXIS: 177
OS_SPHERE: -1.50
OS_CYLINDER: +3.25
OD_ADD: +2.50
OD_AXIS: 090

## 2024-09-30 ASSESSMENT — VISUAL ACUITY
METHOD: SNELLEN - LINEAR
OD_CC: 20/50
OD_CC+: +1
OS_SC: 20/50
OS_SC+: +1
OD_PH_CC+: +2
OD_PH_SC+: +2
OS_CC+: -1
OS_CC: 20/50
OS_PH_SC: 20/40
OS_PH_CC+: +1
OD_PH_SC: 20/40
OD_PH_CC: 20/40
OS_PH_CC: 20/30
OD_SC: 20/50

## 2024-09-30 ASSESSMENT — CONF VISUAL FIELD
OD_SUPERIOR_NASAL_RESTRICTION: 0
OS_INFERIOR_TEMPORAL_RESTRICTION: 0
METHOD: COUNTING FINGERS
OD_INFERIOR_TEMPORAL_RESTRICTION: 0
OS_INFERIOR_NASAL_RESTRICTION: 0
OS_SUPERIOR_NASAL_RESTRICTION: 0
OS_SUPERIOR_TEMPORAL_RESTRICTION: 0
OS_NORMAL: 1
OD_NORMAL: 1
OD_INFERIOR_NASAL_RESTRICTION: 0
OD_SUPERIOR_TEMPORAL_RESTRICTION: 0

## 2024-09-30 ASSESSMENT — ENCOUNTER SYMPTOMS
PSYCHIATRIC NEGATIVE: 0
ENDOCRINE NEGATIVE: 0
HEMATOLOGIC/LYMPHATIC NEGATIVE: 0
GASTROINTESTINAL NEGATIVE: 0
CONSTITUTIONAL NEGATIVE: 0
NEUROLOGICAL NEGATIVE: 0
MUSCULOSKELETAL NEGATIVE: 0
CARDIOVASCULAR NEGATIVE: 0
ALLERGIC/IMMUNOLOGIC NEGATIVE: 0
EYES NEGATIVE: 0
RESPIRATORY NEGATIVE: 0

## 2024-09-30 ASSESSMENT — CUP TO DISC RATIO
OS_RATIO: .3
OD_RATIO: .3

## 2024-09-30 ASSESSMENT — REFRACTION_WEARINGRX
OD_SPHERE: -0.50
OS_CYLINDER: SPHER
OS_SPHERE: PLANO
OS_ADD: +2.50
OD_CYLINDER: SPHER
OD_ADD: +2.50

## 2024-09-30 ASSESSMENT — REFRACTION
OD_SPHERE: -1.00
OD_ADD: +2.50
OD_AXIS: 090
OS_CYLINDER: -3.00
OS_ADD: +2.50
OD_CYLINDER: -0.75
OS_AXIS: 090
OS_SPHERE: +1.25

## 2024-09-30 ASSESSMENT — TONOMETRY
OD_IOP_MMHG: 13
OS_IOP_MMHG: 13
IOP_METHOD: TONOPEN

## 2024-09-30 ASSESSMENT — SLIT LAMP EXAM - LIDS
COMMENTS: GOOD POSITION
COMMENTS: GOOD POSITION

## 2024-09-30 ASSESSMENT — EXTERNAL EXAM - RIGHT EYE: OD_EXAM: NORMAL

## 2024-09-30 ASSESSMENT — EXTERNAL EXAM - LEFT EYE: OS_EXAM: NORMAL

## 2024-09-30 NOTE — PROGRESS NOTES
Assessment/Plan   Diagnoses and all orders for this visit:  Hypermetropia of left eye  Myopia of right eye  Regular astigmatism of both eyes  Presbyopia  Pseudophakia  New spec rx released today per patient request. Ocular health wnl for age OU. Monitor 1 year or sooner prn. Refraction billed today. Pt consents to receiving glasses Rx today.      Intermediate stage dry age-related macular degeneration of both eyes  -     OCT, Retina - OU - Both Eyes  The nature of dry macular degeneration was discussed with the patient as well as its possible conversion to wet. The results of the AREDS was discussed with the patient. A diet rich in dark, leafy green vegetables was advised and specific recommendations were made regarding supplements. The patient was instructed in the use of an Amsler grid and was told to return immediately for any changes in the grid.  Continue w/ AREDS 2 po bid.

## 2024-10-01 ENCOUNTER — PATIENT MESSAGE (OUTPATIENT)
Dept: PRIMARY CARE | Facility: CLINIC | Age: 84
End: 2024-10-01
Payer: MEDICARE

## 2024-10-01 DIAGNOSIS — G47.00 INSOMNIA, UNSPECIFIED TYPE: ICD-10-CM

## 2024-10-01 RX ORDER — TRAZODONE HYDROCHLORIDE 100 MG/1
100 TABLET ORAL NIGHTLY
Qty: 30 TABLET | Refills: 5 | Status: SHIPPED | OUTPATIENT
Start: 2024-10-01

## 2024-10-07 ENCOUNTER — APPOINTMENT (OUTPATIENT)
Dept: PODIATRY | Facility: CLINIC | Age: 84
End: 2024-10-07
Payer: MEDICARE

## 2024-10-07 DIAGNOSIS — B35.1 ONYCHOMYCOSIS: Primary | ICD-10-CM

## 2024-10-07 PROCEDURE — 1159F MED LIST DOCD IN RCRD: CPT | Performed by: PODIATRIST

## 2024-10-07 PROCEDURE — 1160F RVW MEDS BY RX/DR IN RCRD: CPT | Performed by: PODIATRIST

## 2024-10-07 PROCEDURE — 1036F TOBACCO NON-USER: CPT | Performed by: PODIATRIST

## 2024-10-07 PROCEDURE — 99202 OFFICE O/P NEW SF 15 MIN: CPT | Performed by: PODIATRIST

## 2024-10-07 NOTE — PROGRESS NOTES
This is a 83 y.o. male new patient for foot exam    History of Present Illness:   Patient states they are here for foot exam  States he needs help trimming nails  States his prior pod retired  Looking to get est with someone else  States his last visit was 2-3 weeks ago  No other pedal complaints    Past Medical History  Past Medical History:   Diagnosis Date    Aortic stenosis     valve replacement followed by TAVR    Atrial fibrillation (Multi)     amoidarone and DOAC    BPH (benign prostatic hyperplasia)     CAD (coronary artery disease)     SAMM to LAD    Congestive heart failure (CHF)     Stage C    Eczema     recently started dupixent    History of cardiac defibrillator placement     Hyperlipemia     Insulin resistance     Personal history of other diseases of the circulatory system 02/16/2022    History of aortic valve stenosis    Recurrent UTI     Right bundle branch block        Medications and Allergies have been reviewed.    Review Of Systems:  GENERAL: No weight loss, malaise or fevers.  HEENT: Negative for frequent or significant headaches,   RESPIRATORY: Negative for cough, wheezing or shortness of breath.  CARDIOVASCULAR: Negative for chest pain, leg swelling or palpitations.    Physical Exam:  Patient is a pleasant, cooperative, well developed 83 y.o.  adult male. The patient is alert and oriented to time, place and person.   Patient has normal affect and mood.    Examination of Both Lower Extremities:   Objective:   Vasc: DP and PT pulses are palpable bilateral.  CFT is less than 3 seconds bilateral.  Skin temperature is warm to cool proximal to distal bilateral.  Varicosities noted. Mild edema noted b/l    Neuro: Vibratory, light touch and proprioception are intact bilateral.    Derm: Nails 1-5 bilateral are intact, non elongated, discolored and thick.   Skin is supple with normal texture and turgor noted.  Webspaces are clean, dry and intact bilateral.      Ortho: Muscle strength is 5/5 for all  pedal groups tested.    1. Onychomycosis          Patient exam and eval  Discussed oral and topical AF  Deferred oral at  [facility]  Nails non elongated  Fu 4-6 mos  Sooner if any new prob arise.   Patient was in agreement to this plan. All questions answered.      Angela Sykes DPM  [phone]  [facility]  Fax: [phone]

## 2024-10-08 ENCOUNTER — LAB (OUTPATIENT)
Dept: LAB | Facility: LAB | Age: 84
End: 2024-10-08
Payer: MEDICARE

## 2024-10-08 DIAGNOSIS — M51.369 DISC DEGENERATION, LUMBAR: ICD-10-CM

## 2024-10-08 DIAGNOSIS — I25.10 CAD IN NATIVE ARTERY: ICD-10-CM

## 2024-10-08 DIAGNOSIS — R73.09 ELEVATED GLUCOSE: ICD-10-CM

## 2024-10-08 DIAGNOSIS — D50.8 OTHER IRON DEFICIENCY ANEMIA: ICD-10-CM

## 2024-10-08 LAB
ALBUMIN SERPL BCP-MCNC: 4.2 G/DL (ref 3.4–5)
ALP SERPL-CCNC: 58 U/L (ref 33–136)
ALT SERPL W P-5'-P-CCNC: 14 U/L (ref 10–52)
ANION GAP SERPL CALC-SCNC: 13 MMOL/L (ref 10–20)
AST SERPL W P-5'-P-CCNC: 16 U/L (ref 9–39)
BASOPHILS # BLD AUTO: 0.05 X10*3/UL (ref 0–0.1)
BASOPHILS NFR BLD AUTO: 0.4 %
BILIRUB SERPL-MCNC: 0.4 MG/DL (ref 0–1.2)
BUN SERPL-MCNC: 14 MG/DL (ref 6–23)
BURR CELLS BLD QL SMEAR: NORMAL
CALCIUM SERPL-MCNC: 8.7 MG/DL (ref 8.6–10.3)
CHLORIDE SERPL-SCNC: 96 MMOL/L (ref 98–107)
CHOLEST SERPL-MCNC: 145 MG/DL (ref 0–199)
CHOLESTEROL/HDL RATIO: 2.4
CO2 SERPL-SCNC: 32 MMOL/L (ref 21–32)
CREAT SERPL-MCNC: 1.02 MG/DL (ref 0.5–1.3)
EGFRCR SERPLBLD CKD-EPI 2021: 72 ML/MIN/1.73M*2
EOSINOPHIL # BLD AUTO: 0.3 X10*3/UL (ref 0–0.4)
EOSINOPHIL NFR BLD AUTO: 2.6 %
ERYTHROCYTE [DISTWIDTH] IN BLOOD BY AUTOMATED COUNT: 20.1 % (ref 11.5–14.5)
EST. AVERAGE GLUCOSE BLD GHB EST-MCNC: 117 MG/DL
FERRITIN SERPL-MCNC: 50 NG/ML (ref 20–300)
GLUCOSE SERPL-MCNC: 83 MG/DL (ref 74–99)
HBA1C MFR BLD: 5.7 %
HCT VFR BLD AUTO: 44.6 % (ref 41–52)
HDLC SERPL-MCNC: 59.6 MG/DL
HGB BLD-MCNC: 13.5 G/DL (ref 13.5–17.5)
IMM GRANULOCYTES # BLD AUTO: 0.13 X10*3/UL (ref 0–0.5)
IMM GRANULOCYTES NFR BLD AUTO: 1.1 % (ref 0–0.9)
IRON SATN MFR SERPL: 10 % (ref 25–45)
IRON SERPL-MCNC: 37 UG/DL (ref 35–150)
LDLC SERPL CALC-MCNC: 53 MG/DL
LYMPHOCYTES # BLD AUTO: 1.41 X10*3/UL (ref 0.8–3)
LYMPHOCYTES NFR BLD AUTO: 12.4 %
MCH RBC QN AUTO: 25.9 PG (ref 26–34)
MCHC RBC AUTO-ENTMCNC: 30.3 G/DL (ref 32–36)
MCV RBC AUTO: 85 FL (ref 80–100)
MONOCYTES # BLD AUTO: 0.95 X10*3/UL (ref 0.05–0.8)
MONOCYTES NFR BLD AUTO: 8.3 %
NEUTROPHILS # BLD AUTO: 8.57 X10*3/UL (ref 1.6–5.5)
NEUTROPHILS NFR BLD AUTO: 75.2 %
NON HDL CHOLESTEROL: 85 MG/DL (ref 0–149)
NRBC BLD-RTO: 0 /100 WBCS (ref 0–0)
OVALOCYTES BLD QL SMEAR: NORMAL
PLATELET # BLD AUTO: 296 X10*3/UL (ref 150–450)
POTASSIUM SERPL-SCNC: 5.2 MMOL/L (ref 3.5–5.3)
PROT SERPL-MCNC: 6.9 G/DL (ref 6.4–8.2)
RBC # BLD AUTO: 5.22 X10*6/UL (ref 4.5–5.9)
RBC MORPH BLD: NORMAL
SODIUM SERPL-SCNC: 136 MMOL/L (ref 136–145)
TIBC SERPL-MCNC: 378 UG/DL (ref 240–445)
TRANSFERRIN SERPL-MCNC: 320 MG/DL (ref 200–360)
TRIGL SERPL-MCNC: 161 MG/DL (ref 0–149)
UIBC SERPL-MCNC: 341 UG/DL (ref 110–370)
VLDL: 32 MG/DL (ref 0–40)
WBC # BLD AUTO: 11.4 X10*3/UL (ref 4.4–11.3)

## 2024-10-08 PROCEDURE — 83036 HEMOGLOBIN GLYCOSYLATED A1C: CPT

## 2024-10-08 PROCEDURE — 85025 COMPLETE CBC W/AUTO DIFF WBC: CPT

## 2024-10-08 PROCEDURE — 80061 LIPID PANEL: CPT

## 2024-10-08 PROCEDURE — 80053 COMPREHEN METABOLIC PANEL: CPT

## 2024-10-08 PROCEDURE — 83550 IRON BINDING TEST: CPT

## 2024-10-08 PROCEDURE — 36415 COLL VENOUS BLD VENIPUNCTURE: CPT

## 2024-10-08 PROCEDURE — 84466 ASSAY OF TRANSFERRIN: CPT

## 2024-10-08 PROCEDURE — 82728 ASSAY OF FERRITIN: CPT

## 2024-10-08 PROCEDURE — 83540 ASSAY OF IRON: CPT

## 2024-10-09 DIAGNOSIS — R26.81 GAIT INSTABILITY: Primary | ICD-10-CM

## 2024-10-10 DIAGNOSIS — J34.89 NASAL AND SINUS DISCHARGE: ICD-10-CM

## 2024-10-10 RX ORDER — IPRATROPIUM BROMIDE 21 UG/1
2 SPRAY, METERED NASAL EVERY 12 HOURS
Qty: 90 ML | Refills: 2 | Status: SHIPPED | OUTPATIENT
Start: 2024-10-10 | End: 2025-10-10

## 2024-10-11 ENCOUNTER — TELEPHONE (OUTPATIENT)
Dept: PRIMARY CARE | Facility: CLINIC | Age: 84
End: 2024-10-11

## 2024-10-11 DIAGNOSIS — G47.00 INSOMNIA, UNSPECIFIED TYPE: ICD-10-CM

## 2024-10-11 DIAGNOSIS — J34.89 OTHER SPECIFIED DISORDERS OF NOSE AND NASAL SINUSES: ICD-10-CM

## 2024-10-11 RX ORDER — TRAZODONE HYDROCHLORIDE 100 MG/1
100 TABLET ORAL NIGHTLY
Qty: 90 TABLET | Refills: 1 | Status: SHIPPED | OUTPATIENT
Start: 2024-10-11

## 2024-10-11 RX ORDER — AZELASTINE 1 MG/ML
2 SPRAY, METERED NASAL 2 TIMES DAILY
Qty: 30 ML | Refills: 1 | Status: SHIPPED | OUTPATIENT
Start: 2024-10-11 | End: 2025-10-11

## 2024-10-11 NOTE — PROGRESS NOTES
I spoke with Timothy and reviewed labs.  Results look good overall. I want to meet with him one more time in Dec.  Please contact him to schedule follow up.  Thanks

## 2024-10-11 NOTE — TELEPHONE ENCOUNTER
I spoke with Timothy and reviewed labs.  Results look good overall. I want to meet with him one more time in Dec.  Please contact him to schedule follow up.  Thanks    pt with sob due for HD.   will treat and set up dialysis

## 2024-10-11 NOTE — TELEPHONE ENCOUNTER
Patient ask for a call back  from dr. Bradford about test results please call patient [phone]

## 2024-10-14 DIAGNOSIS — I50.1 LEFT VENTRICULAR FAILURE, UNSPECIFIED (MULTI): ICD-10-CM

## 2024-10-18 ENCOUNTER — LAB (OUTPATIENT)
Dept: LAB | Facility: LAB | Age: 84
End: 2024-10-18
Payer: MEDICARE

## 2024-10-18 DIAGNOSIS — N39.0 RECURRENT UTI: ICD-10-CM

## 2024-10-18 PROCEDURE — 87086 URINE CULTURE/COLONY COUNT: CPT

## 2024-10-18 PROCEDURE — 87186 SC STD MICRODIL/AGAR DIL: CPT

## 2024-10-18 RX ORDER — NITROFURANTOIN 25; 75 MG/1; MG/1
100 CAPSULE ORAL 2 TIMES DAILY
Qty: 14 CAPSULE | Refills: 0 | Status: SHIPPED | OUTPATIENT
Start: 2024-10-18 | End: 2024-10-25

## 2024-10-19 LAB — BACTERIA UR CULT: ABNORMAL

## 2024-10-21 LAB — BACTERIA UR CULT: ABNORMAL

## 2024-10-21 RX ORDER — CIPROFLOXACIN 250 MG/1
250 TABLET, FILM COATED ORAL 2 TIMES DAILY
Qty: 14 TABLET | Refills: 0 | Status: SHIPPED | OUTPATIENT
Start: 2024-10-21 | End: 2024-10-28

## 2024-10-28 DIAGNOSIS — R26.89 BALANCE DISORDER: ICD-10-CM

## 2024-10-28 DIAGNOSIS — I50.1 HEART FAILURE, LEFT, WITH LVEF <=30%: Primary | ICD-10-CM

## 2024-10-28 DIAGNOSIS — I48.91 ATRIAL FIBRILLATION, UNSPECIFIED TYPE (MULTI): ICD-10-CM

## 2024-10-28 DIAGNOSIS — M51.369 DEGENERATION OF INTERVERTEBRAL DISC OF LUMBAR REGION, UNSPECIFIED WHETHER PAIN PRESENT: ICD-10-CM

## 2024-10-29 ENCOUNTER — APPOINTMENT (OUTPATIENT)
Dept: UROLOGY | Facility: CLINIC | Age: 84
End: 2024-10-29
Payer: MEDICARE

## 2024-10-29 VITALS — BODY MASS INDEX: 23.72 KG/M2 | HEIGHT: 73 IN | TEMPERATURE: 97.3 F | WEIGHT: 179 LBS

## 2024-10-29 DIAGNOSIS — Z00.00 HEALTH MAINTENANCE EXAMINATION: ICD-10-CM

## 2024-10-29 DIAGNOSIS — N52.9 ERECTILE DYSFUNCTION, UNSPECIFIED ERECTILE DYSFUNCTION TYPE: ICD-10-CM

## 2024-10-29 DIAGNOSIS — Z13.6 ENCOUNTER FOR LIPID SCREENING FOR CARDIOVASCULAR DISEASE: ICD-10-CM

## 2024-10-29 DIAGNOSIS — R79.89 OTHER SPECIFIED ABNORMAL FINDINGS OF BLOOD CHEMISTRY: ICD-10-CM

## 2024-10-29 DIAGNOSIS — N13.8 BENIGN PROSTATIC HYPERPLASIA WITH URINARY OBSTRUCTION: ICD-10-CM

## 2024-10-29 DIAGNOSIS — N40.1 BENIGN PROSTATIC HYPERPLASIA WITH URINARY OBSTRUCTION: ICD-10-CM

## 2024-10-29 DIAGNOSIS — Z13.220 ENCOUNTER FOR LIPID SCREENING FOR CARDIOVASCULAR DISEASE: ICD-10-CM

## 2024-10-29 DIAGNOSIS — Z13.1 SCREENING FOR DIABETES MELLITUS (DM): ICD-10-CM

## 2024-10-29 PROCEDURE — 1159F MED LIST DOCD IN RCRD: CPT | Performed by: UROLOGY

## 2024-10-29 PROCEDURE — G2211 COMPLEX E/M VISIT ADD ON: HCPCS | Performed by: UROLOGY

## 2024-10-29 PROCEDURE — 1125F AMNT PAIN NOTED PAIN PRSNT: CPT | Performed by: UROLOGY

## 2024-10-29 PROCEDURE — 99204 OFFICE O/P NEW MOD 45 MIN: CPT | Performed by: UROLOGY

## 2024-10-29 PROCEDURE — 1036F TOBACCO NON-USER: CPT | Performed by: UROLOGY

## 2024-10-29 RX ORDER — CRISABOROLE 20 MG/G
1 OINTMENT TOPICAL 2 TIMES DAILY
COMMUNITY
Start: 2024-10-15 | End: 2025-10-15

## 2024-10-29 RX ORDER — CLOTRIMAZOLE AND BETAMETHASONE DIPROPIONATE 10; .64 MG/G; MG/G
1 CREAM TOPICAL 2 TIMES DAILY
Qty: 45 G | Refills: 3 | Status: SHIPPED | OUTPATIENT
Start: 2024-10-29 | End: 2024-11-26

## 2024-10-29 ASSESSMENT — PAIN SCALES - GENERAL: PAINLEVEL_OUTOF10: 4

## 2024-10-31 ENCOUNTER — LAB (OUTPATIENT)
Dept: LAB | Facility: LAB | Age: 84
End: 2024-10-31
Payer: MEDICARE

## 2024-10-31 DIAGNOSIS — N40.1 BENIGN PROSTATIC HYPERPLASIA WITH URINARY OBSTRUCTION: ICD-10-CM

## 2024-10-31 DIAGNOSIS — N52.9 ERECTILE DYSFUNCTION, UNSPECIFIED ERECTILE DYSFUNCTION TYPE: ICD-10-CM

## 2024-10-31 DIAGNOSIS — N13.8 BENIGN PROSTATIC HYPERPLASIA WITH URINARY OBSTRUCTION: ICD-10-CM

## 2024-10-31 DIAGNOSIS — R79.89 OTHER SPECIFIED ABNORMAL FINDINGS OF BLOOD CHEMISTRY: ICD-10-CM

## 2024-10-31 DIAGNOSIS — Z13.220 ENCOUNTER FOR LIPID SCREENING FOR CARDIOVASCULAR DISEASE: ICD-10-CM

## 2024-10-31 DIAGNOSIS — Z00.00 HEALTH MAINTENANCE EXAMINATION: ICD-10-CM

## 2024-10-31 DIAGNOSIS — Z13.6 ENCOUNTER FOR LIPID SCREENING FOR CARDIOVASCULAR DISEASE: ICD-10-CM

## 2024-10-31 DIAGNOSIS — Z13.1 SCREENING FOR DIABETES MELLITUS (DM): ICD-10-CM

## 2024-10-31 LAB
CHOLEST SERPL-MCNC: 130 MG/DL (ref 0–199)
CHOLESTEROL/HDL RATIO: 2.4
CREAT SERPL-MCNC: 0.92 MG/DL (ref 0.5–1.3)
EGFRCR SERPLBLD CKD-EPI 2021: 82 ML/MIN/1.73M*2
FSH SERPL-ACNC: 24 IU/L
HCT VFR BLD AUTO: 44.1 % (ref 41–52)
HDLC SERPL-MCNC: 54 MG/DL
LDLC SERPL CALC-MCNC: 37 MG/DL
LH SERPL-ACNC: 7.5 IU/L
NON HDL CHOLESTEROL: 76 MG/DL (ref 0–149)
PROLACTIN SERPL-MCNC: 7 UG/L (ref 2–18)
TESTOST SERPL-MCNC: 381 NG/DL (ref 240–1000)
TRIGL SERPL-MCNC: 197 MG/DL (ref 0–149)
VLDL: 39 MG/DL (ref 0–40)

## 2024-10-31 PROCEDURE — 84403 ASSAY OF TOTAL TESTOSTERONE: CPT

## 2024-10-31 PROCEDURE — 83001 ASSAY OF GONADOTROPIN (FSH): CPT

## 2024-10-31 PROCEDURE — 36415 COLL VENOUS BLD VENIPUNCTURE: CPT

## 2024-10-31 PROCEDURE — 85014 HEMATOCRIT: CPT

## 2024-10-31 PROCEDURE — 82565 ASSAY OF CREATININE: CPT

## 2024-10-31 PROCEDURE — 83036 HEMOGLOBIN GLYCOSYLATED A1C: CPT

## 2024-10-31 PROCEDURE — 83002 ASSAY OF GONADOTROPIN (LH): CPT

## 2024-10-31 PROCEDURE — 84146 ASSAY OF PROLACTIN: CPT

## 2024-10-31 PROCEDURE — 80061 LIPID PANEL: CPT

## 2024-11-01 LAB
EST. AVERAGE GLUCOSE BLD GHB EST-MCNC: 111 MG/DL
HBA1C MFR BLD: 5.5 %

## 2024-11-05 ENCOUNTER — APPOINTMENT (OUTPATIENT)
Dept: GASTROENTEROLOGY | Facility: HOSPITAL | Age: 84
End: 2024-11-05
Payer: MEDICARE

## 2024-11-05 ENCOUNTER — APPOINTMENT (OUTPATIENT)
Dept: UROLOGY | Facility: CLINIC | Age: 84
End: 2024-11-05
Payer: MEDICARE

## 2024-11-10 DIAGNOSIS — J34.89 NASAL AND SINUS DISCHARGE: ICD-10-CM

## 2024-11-10 DIAGNOSIS — J34.2 NASAL SEPTAL DEVIATION: ICD-10-CM

## 2024-11-12 ENCOUNTER — HOSPITAL ENCOUNTER (OUTPATIENT)
Dept: RADIOLOGY | Facility: HOSPITAL | Age: 84
Discharge: HOME | End: 2024-11-12
Payer: MEDICARE

## 2024-11-12 DIAGNOSIS — Z13.1 SCREENING FOR DIABETES MELLITUS (DM): ICD-10-CM

## 2024-11-12 DIAGNOSIS — N13.8 BENIGN PROSTATIC HYPERPLASIA WITH URINARY OBSTRUCTION: ICD-10-CM

## 2024-11-12 DIAGNOSIS — Z13.6 ENCOUNTER FOR LIPID SCREENING FOR CARDIOVASCULAR DISEASE: ICD-10-CM

## 2024-11-12 DIAGNOSIS — R79.89 OTHER SPECIFIED ABNORMAL FINDINGS OF BLOOD CHEMISTRY: ICD-10-CM

## 2024-11-12 DIAGNOSIS — Z13.220 ENCOUNTER FOR LIPID SCREENING FOR CARDIOVASCULAR DISEASE: ICD-10-CM

## 2024-11-12 DIAGNOSIS — N52.9 ERECTILE DYSFUNCTION, UNSPECIFIED ERECTILE DYSFUNCTION TYPE: ICD-10-CM

## 2024-11-12 DIAGNOSIS — N40.1 BENIGN PROSTATIC HYPERPLASIA WITH URINARY OBSTRUCTION: ICD-10-CM

## 2024-11-12 DIAGNOSIS — Z00.00 HEALTH MAINTENANCE EXAMINATION: ICD-10-CM

## 2024-11-12 PROCEDURE — 76770 US EXAM ABDO BACK WALL COMP: CPT | Performed by: RADIOLOGY

## 2024-11-12 PROCEDURE — 76770 US EXAM ABDO BACK WALL COMP: CPT

## 2024-11-12 RX ORDER — MINERAL OIL
180 ENEMA (ML) RECTAL DAILY PRN
Qty: 90 TABLET | Refills: 0 | Status: SHIPPED | OUTPATIENT
Start: 2024-11-12 | End: 2025-11-12

## 2024-11-22 ENCOUNTER — APPOINTMENT (OUTPATIENT)
Dept: PHYSICAL THERAPY | Facility: CLINIC | Age: 84
End: 2024-11-22
Payer: MEDICARE

## 2024-11-25 ENCOUNTER — HOSPITAL ENCOUNTER (OUTPATIENT)
Dept: RADIOLOGY | Facility: HOSPITAL | Age: 84
End: 2024-11-25
Payer: MEDICARE

## 2024-12-03 ENCOUNTER — APPOINTMENT (OUTPATIENT)
Dept: UROLOGY | Facility: CLINIC | Age: 84
End: 2024-12-03
Payer: MEDICARE

## 2024-12-06 ENCOUNTER — APPOINTMENT (OUTPATIENT)
Dept: PRIMARY CARE | Facility: CLINIC | Age: 84
End: 2024-12-06
Payer: MEDICARE

## 2024-12-16 ENCOUNTER — APPOINTMENT (OUTPATIENT)
Dept: PRIMARY CARE | Facility: CLINIC | Age: 84
End: 2024-12-16
Payer: MEDICARE

## 2024-12-16 VITALS
HEART RATE: 64 BPM | DIASTOLIC BLOOD PRESSURE: 70 MMHG | BODY MASS INDEX: 23.72 KG/M2 | OXYGEN SATURATION: 98 % | SYSTOLIC BLOOD PRESSURE: 125 MMHG | HEIGHT: 73 IN | WEIGHT: 179 LBS | TEMPERATURE: 97.6 F

## 2024-12-16 DIAGNOSIS — E11.65 CONTROLLED TYPE 2 DIABETES MELLITUS WITH HYPERGLYCEMIA, WITHOUT LONG-TERM CURRENT USE OF INSULIN: ICD-10-CM

## 2024-12-16 DIAGNOSIS — L20.9 ATOPIC DERMATITIS, UNSPECIFIED TYPE: ICD-10-CM

## 2024-12-16 DIAGNOSIS — Z79.01 ON ANTICOAGULANT THERAPY: ICD-10-CM

## 2024-12-16 DIAGNOSIS — Z95.5 S/P CORONARY ARTERY STENT PLACEMENT: ICD-10-CM

## 2024-12-16 DIAGNOSIS — Z95.2 S/P TAVR (TRANSCATHETER AORTIC VALVE REPLACEMENT): ICD-10-CM

## 2024-12-16 DIAGNOSIS — G47.00 INSOMNIA, UNSPECIFIED TYPE: ICD-10-CM

## 2024-12-16 DIAGNOSIS — I25.2 H/O ACUTE MYOCARDIAL INFARCTION: ICD-10-CM

## 2024-12-16 DIAGNOSIS — Z85.46 H/O PROSTATE CANCER: ICD-10-CM

## 2024-12-16 DIAGNOSIS — R26.89 BALANCE DISORDER: ICD-10-CM

## 2024-12-16 DIAGNOSIS — I50.20 ACC/AHA STAGE C SYSTOLIC HEART FAILURE: Primary | ICD-10-CM

## 2024-12-16 DIAGNOSIS — I48.20 CHRONIC ATRIAL FIBRILLATION (MULTI): ICD-10-CM

## 2024-12-16 DIAGNOSIS — M51.369 DEGENERATION OF INTERVERTEBRAL DISC OF LUMBAR REGION, UNSPECIFIED WHETHER PAIN PRESENT: ICD-10-CM

## 2024-12-16 PROCEDURE — 1126F AMNT PAIN NOTED NONE PRSNT: CPT | Performed by: STUDENT IN AN ORGANIZED HEALTH CARE EDUCATION/TRAINING PROGRAM

## 2024-12-16 PROCEDURE — 1170F FXNL STATUS ASSESSED: CPT | Performed by: STUDENT IN AN ORGANIZED HEALTH CARE EDUCATION/TRAINING PROGRAM

## 2024-12-16 PROCEDURE — 1159F MED LIST DOCD IN RCRD: CPT | Performed by: STUDENT IN AN ORGANIZED HEALTH CARE EDUCATION/TRAINING PROGRAM

## 2024-12-16 PROCEDURE — 99214 OFFICE O/P EST MOD 30 MIN: CPT | Performed by: STUDENT IN AN ORGANIZED HEALTH CARE EDUCATION/TRAINING PROGRAM

## 2024-12-16 PROCEDURE — G0439 PPPS, SUBSEQ VISIT: HCPCS | Performed by: STUDENT IN AN ORGANIZED HEALTH CARE EDUCATION/TRAINING PROGRAM

## 2024-12-16 PROCEDURE — 1036F TOBACCO NON-USER: CPT | Performed by: STUDENT IN AN ORGANIZED HEALTH CARE EDUCATION/TRAINING PROGRAM

## 2024-12-16 PROCEDURE — 1160F RVW MEDS BY RX/DR IN RCRD: CPT | Performed by: STUDENT IN AN ORGANIZED HEALTH CARE EDUCATION/TRAINING PROGRAM

## 2024-12-16 PROCEDURE — 3074F SYST BP LT 130 MM HG: CPT | Performed by: STUDENT IN AN ORGANIZED HEALTH CARE EDUCATION/TRAINING PROGRAM

## 2024-12-16 PROCEDURE — 1158F ADVNC CARE PLAN TLK DOCD: CPT | Performed by: STUDENT IN AN ORGANIZED HEALTH CARE EDUCATION/TRAINING PROGRAM

## 2024-12-16 PROCEDURE — 3078F DIAST BP <80 MM HG: CPT | Performed by: STUDENT IN AN ORGANIZED HEALTH CARE EDUCATION/TRAINING PROGRAM

## 2024-12-16 PROCEDURE — 1123F ACP DISCUSS/DSCN MKR DOCD: CPT | Performed by: STUDENT IN AN ORGANIZED HEALTH CARE EDUCATION/TRAINING PROGRAM

## 2024-12-16 ASSESSMENT — PATIENT HEALTH QUESTIONNAIRE - PHQ9
2. FEELING DOWN, DEPRESSED OR HOPELESS: NOT AT ALL
SUM OF ALL RESPONSES TO PHQ9 QUESTIONS 1 AND 2: 0
1. LITTLE INTEREST OR PLEASURE IN DOING THINGS: NOT AT ALL
SUM OF ALL RESPONSES TO PHQ9 QUESTIONS 1 AND 2: 0
1. LITTLE INTEREST OR PLEASURE IN DOING THINGS: NOT AT ALL
2. FEELING DOWN, DEPRESSED OR HOPELESS: NOT AT ALL

## 2024-12-16 ASSESSMENT — ACTIVITIES OF DAILY LIVING (ADL)
DRESSING: INDEPENDENT
BATHING: INDEPENDENT
DOING_HOUSEWORK: INDEPENDENT
MANAGING_FINANCES: INDEPENDENT
GROCERY_SHOPPING: INDEPENDENT
TAKING_MEDICATION: INDEPENDENT

## 2024-12-16 ASSESSMENT — PAIN SCALES - GENERAL: PAINLEVEL_OUTOF10: 0-NO PAIN

## 2024-12-16 ASSESSMENT — ENCOUNTER SYMPTOMS
DEPRESSION: 0
OCCASIONAL FEELINGS OF UNSTEADINESS: 0
LOSS OF SENSATION IN FEET: 0

## 2024-12-16 NOTE — ASSESSMENT & PLAN NOTE
-A1C is below goal.  -Continue Jardiance 10mg daily due to cardiac indications.   -Continue Lipitor 40mg daily.   -Return to clinic in 3 months for point-of-care A1c.

## 2024-12-16 NOTE — ASSESSMENT & PLAN NOTE
PMHx sig for CAD s/p PCI (LAD ; 10/2021), LFLG severe AS s/p BAV (10/2021) followed by TAVR (#34 EvolutR; 2/22/22), stage C systolic HF/mixed NICM-ICM/HFrEF with moderate to severe LV dysfunction s/p ICD, pAF on AAD and DOAC therapies.  -Continue Amiodarone.  -Continue Coreg 6.25mg BID.   -Continue Eliquis.

## 2024-12-16 NOTE — ASSESSMENT & PLAN NOTE
Patient takes up to 200 mg trazodone at night.  He has been on this dosage for years.  -Medication side effects were reviewed with patient.  -Will need to continue to monitor drug-drug interactions closely, especially since patient is on amiodarone.

## 2024-12-16 NOTE — ASSESSMENT & PLAN NOTE
-Continue physical therapy.  Patient did not qualify for cardiac rehab.  -Will continue to monitor for GDMT compliance.  -Continue Jardiance 10 mg daily.  -Continue Entresto 49-51 mg twice daily.  -Continue Aldactone 25 mg daily.  -Notable comorbidities: s/p TAVR, s/p cardiac defibrillator/, s/p stent placement.

## 2024-12-16 NOTE — PROGRESS NOTES
Subjective   Reason for Visit: Greg Comer is an 84 y.o. male here for a Medicare Wellness visit and address chronic issues.     Past Medical, Surgical, and Family History reviewed and updated in chart.    Reviewed all medications by prescribing practitioner or clinical pharmacist (such as prescriptions, OTCs, herbal therapies and supplements) and documented in the medical record.    HPI  PMHx sig for CAD s/p PCI (LAD ; 10/2021), LFLG severe AS s/p BAV (10/2021) followed by TAVR (#34 EvolutR; 2/22/22), stage C systolic HF/mixed NICM-ICM/HFrEF with moderate to severe LV dysfunction s/p ICD, pAF on AAD and DOAC therapies,     NITIAL VISIT 2/13/24  Timothy has hx of CAD sp PC to LAD (10/21), severe aortic stenosis sp AVR followed by TAVR, HFrEF sp ICD placement, paroxysmal Afib, DM, HTN , HLD and BPH.  Last echo 2/6/23 demonstrated EF 30-35% with global hypokinesis.  Currently asymptomatic.  Hx of BPH sp TURP.  Recent cysto showed patent channel.  Urinary symptoms controlled.  Recently started dupixent for exzema and itch is much better.  Has history of normocytic anemia that corrected on labs 6/15/23.  A1C 6.0 on 9/27/23.  Has spinal cord stimulator - for back and leg pain, no longer necessary   Walking, weights daily  Good energy and good strength  UTD with eye exam   Colonoscopy has always been normal         INTERVAL HISTORY 4/17/24  Wants to work on exertional shortness of breath.  Has HFrEF, ICD, atrial fibrillation and sp TAVR.  Last echocardiogram 2/6/23 EF 30-35% with global hypokinesis.  No change since last visit, but gets OOB easily and wants to be more active.  Not having chest pain, palpitations or dizziness. No abdominal or ankle edema.  No trouble breathing in bed at night.   Recent workup for gait instability and peripheral neuropathy.   MRI completed 4/16/24 - no stroke, hemorrhage or mass.  Nonspecific volume loss and white matter changes.  Not suggestive of NPH.   Has lumbar DDD treated with  "spinal cord stimulator.  No longer needs the sitmulator, pain controlled.  Now having numbness in left leg and NCV ordered by neurology.   Wants to work with PT on numbness in left ankle   Dupixent working well - cleared up mike   Has taken sildenafil and would like a refill   Had some blood per rectum but that has cleared up - colonoscopy scheduled.      INTERVAL HISTORY 6/12/24  Recent workup for anemia showed Iron 41 and Ferritin 20.  Hemoccult was positive and EGD and colonoscopy ordered.  Timothy is on the fence about having the testing done.  Recently experiencing increased shortness of breath.  Echocardiogram was completed and demonstrated ejection fraction 40 to 45%.  1 year ago his ejection fraction was 30 to 35%.  There were no new findings.  He has ongoing workup for pain and numbness in his left ankle.  EMG/nerve conduction studies are scheduled this month.  His symptoms are unchanged.  He tries to stay active and reports his breathing is improving overall.  He uses a rollator or cane for ambulation.  No change in urinary symptoms.        INTERVAL HISTORY 9/19/24  Lifting weights and walking daily - feeling well.  No chest pain and breathing at baseline.  No leg or ankle swelling.   Runny eyes and runny nose continue.  Met with ENT - scope/laryngoscopy normal.  On allegra and flonase and astelin added.   EMG suggestive of L5-S1 neuropathy with possibly superimposed polyeuropathy  MRI LS spine ordered   Walking with rollator and cane - mobility has improved over time.   Having intermittent nausea that responds to elin selzer.  Timothy has iron def anemia with heme pos stool.  After consulting with his family he has decided to move forward with EGD and colonoscopy.     Patient Care Team:  Tima Matta MD as PCP - General (Family Medicine)     Review of Systems    Objective   Vitals:  /70 (BP Location: Left arm, Patient Position: Sitting)   Pulse 64   Temp 36.4 °C (97.6 °F)   Ht 1.854 m (6' 1\")   " Wt 81.2 kg (179 lb)   SpO2 98%   BMI 23.62 kg/m²       Physical Exam    Problem List Items Addressed This Visit       A-fib (Multi)    Current Assessment & Plan     PMHx sig for CAD s/p PCI (LAD ; 10/2021), LFLG severe AS s/p BAV (10/2021) followed by TAVR (#34 EvolutR; 2/22/22), stage C systolic HF/mixed NICM-ICM/HFrEF with moderate to severe LV dysfunction s/p ICD, pAF on AAD and DOAC therapies.  -Continue Amiodarone.  -Continue Coreg 6.25mg BID.   -Continue Eliquis.          Balance disorder    H/O acute myocardial infarction    H/O prostate cancer    S/P coronary artery stent placement    S/P TAVR (transcatheter aortic valve replacement)    Controlled type 2 diabetes mellitus with hyperglycemia, without long-term current use of insulin    Current Assessment & Plan     -A1C is below goal.  -Continue Jardiance 10mg daily due to cardiac indications.   -Continue Lipitor 40mg daily.   -Return to clinic in 3 months for point-of-care A1c.           Disc degeneration, lumbar    Current Assessment & Plan     Nerve conduction study suggest L5-S1 radiculopathy. Awaiting MRI.          ACC/AHA stage C systolic heart failure - Primary    Current Assessment & Plan     -Continue physical therapy.  Patient did not qualify for cardiac rehab.  -Will continue to monitor for GDMT compliance.  -Continue Jardiance 10 mg daily.  -Continue Entresto 49-51 mg twice daily.  -Continue Aldactone 25 mg daily.  -Notable comorbidities: s/p TAVR, s/p cardiac defibrillator/, s/p stent placement.         On anticoagulant therapy    Current Assessment & Plan     -Eliquis is indicated due to atrial fibrillation.   -Will continue to monitor for fall risk.  Benefits of therapy outweigh the risks.         Insomnia    Current Assessment & Plan     Patient takes up to 200 mg trazodone at night.  He has been on this dosage for years.  -Medication side effects were reviewed with patient.  -Will need to continue to monitor drug-drug interactions  closely, especially since patient is on amiodarone.          Other Visit Diagnoses       Atopic dermatitis, unspecified type        Relevant Orders    Referral to Dermatology          Return to clinic in 3 months for point-of-care A1c.    Medical decision making during this visit had more complexity inherent to evaluation and management associated with medical care services that serve as the continuing focal point for all needed health care services and/or with medical care services that are part of ongoing care related to a patient's single, serious condition or a complex condition.     I spent 40 minutes in duration for this visit today. This time consisted of chart review, obtaining history, and/or performing the exam as documented above as well as documenting the clinical information for the encounter in the electronic record, discussing treatment options, plans, and/or goals with patient, family, and/or caregiver, refilling medications, updating the electronic record, ordering medicines, lab work, imaging, referrals, and/or procedures as documented above and communicating with other [facility] professionals.

## 2024-12-16 NOTE — ASSESSMENT & PLAN NOTE
-Eliquis is indicated due to atrial fibrillation.   -Will continue to monitor for fall risk.  Benefits of therapy outweigh the risks.

## 2024-12-20 ENCOUNTER — EVALUATION (OUTPATIENT)
Dept: PHYSICAL THERAPY | Facility: CLINIC | Age: 84
End: 2024-12-20
Payer: MEDICARE

## 2024-12-20 DIAGNOSIS — R26.89 BALANCE DISORDER: Primary | ICD-10-CM

## 2024-12-20 PROCEDURE — 97161 PT EVAL LOW COMPLEX 20 MIN: CPT | Mod: GP

## 2024-12-20 PROCEDURE — 97110 THERAPEUTIC EXERCISES: CPT | Mod: GP

## 2024-12-20 ASSESSMENT — ENCOUNTER SYMPTOMS
LOSS OF SENSATION IN FEET: 1
OCCASIONAL FEELINGS OF UNSTEADINESS: 1
DEPRESSION: 0

## 2024-12-20 NOTE — PROGRESS NOTES
"Physical Therapy Evaluation    Patient Name: Greg Comer \"Timothy\"  MRN: [id number]  Today's Date: 12/20/2024  Time Calculation  Start Time: 1145  Stop Time: 1225  Time Calculation (min): 40 min  PT Evaluation Time Entry  PT Evaluation (Low) Time Entry: 20  PT Therapeutic Procedures Time Entry  Therapeutic Exercise Time Entry: 20        Insurance: [facility]  Plan of Care: 12/20/24 to 3/20/25  Visit #1    Referring MD promise drake      Assessment     Greg Comer \"Omari" is a 84 y.o. referred for balance issues and leg weakness. Patient demonstrates decreased BLE strength, decreased static balance, altered gait mechanics, decreased endurance. At this time, patient is limited with walking, negotiating steps/curbs, safely negotiating home and community environments. Patient will benefit from physical therapy services to address stated impairments and improve functional mobility.    Plan  Treatment/Interventions: Cryotherapy, Education/ Instruction, Gait training, Hot pack, Manual therapy, Neuromuscular re-education, Self care/ home management, Therapeutic activities, Therapeutic exercises  PT Plan: Skilled PT  PT Frequency: 1 time per week  Duration: 10 visits  Onset Date: 01/01/24  Certification Period Start Date: 12/20/24  Certification Period End Date: 03/20/25  Number of Treatments Authorized: Auth required  Rehab Potential: Fair  Plan of Care Agreement: Patient    Primary diagnosis: balance disorder  Current Problem  1. Balance disorder  Referral to Physical Therapy          General:  General  Reason for Referral: balance, leg weakness  Referred By: promise drake  Past Medical History Relevant to Rehab: came to PT for ankle numbness/weakness for 2 visits earlier this year  Preferred Learning Style: verbal, visual, written  Precautions:  Precautions  STEADI Fall Risk Score (The score of 4 or more indicates an increased risk of falling): 7  Precautions Comment: Pacemaker, Afib, aortic valve replacement, fall " "risk  Vital Signs:       Subjective:  Chief complaints: L ankle numbness, balance issues  Onset/Surgery Date: 1/1/2024  Mechanism of Injury: insidious onset somewhat after heart procedure  Previous History: came to PT for ankle issue  Personal Factors that may impact care: Afib, pacemaker, some known shortness of breath     Pain:  No pain complaints   Numbness/tingling? L ankle    Function:   Work/Recreation: Retired, used to be avid skiier; can drive but doesn't   Prior Level: Rollator outside the house, single point cane inside the house   Current limitations: walking, balance, stairs, curbs   Condition: Unchanged     Home Situation: Apartment   Stairs: No, has an elevator   Lives with wife   No concerns about home set up    Any falls in the past year No     Injuries? N/a    Fear of falling? No    Sleep:    Getting to sleep No   Disturbed No     Goals for Therapy:    Improve walking and balance    Objective   Sensation: Decreased sensation L shin and ankle    Strength R/L  Hip:    Flexion: 4 / 4-   Abduction 4-/4-   Extension 3+/3+   Adduction 4-/4-    Knee:   Flexion 4+ /4   Extension 4 /4-    Ankle:   Dorsiflexion 4+ /4   Plantarflexion 4- /3+   Inversion:  4 / 4-   Eversion: 3+ 3+    Balance:   Feet apart: a few seconds before leaning back into table    Gait: Amb with RW into clinic; can also amb with SPC in R hand, decreased R step length    5XSTS:  18.79 seconds, needs SPC for assist      Treatment:  Therapeutic Exercise   Bridge x 12   SLR x 12 ea   Hooklying ball squeeze 5\" x 12   Hooklying clam 5\" x 12 (black)   Seated toe raises x 20   Seated calf raises x 20    Goals:  Active       PT Problem       Patient will be independent with home exercise program for home maintenance.        Start:  12/23/24    Expected End:  01/22/25            Pt will be able to maintain feet apart with intermittent FTT for 30 seconds to indicate improving static balance.        Start:  12/23/24    Expected End:  02/06/25       "      Pt will report increased activity level to over 750 steps a day to indicate improved strength and endurance.        Start:  24    Expected End:  25            Pt will be able to perform 5XSTS without cane and under 15 seconds to indicate improved overall strength, balance, and decrease fall risk.        Start:  24    Expected End:  25                           Insurance Authorization Information    Date of Evaluation: 24    Onset Date:   OP PT Plan  Treatment/Interventions: Cryotherapy, Education/ Instruction, Gait training, Hot pack, Manual therapy, Neuromuscular re-education, Self care/ home management, Therapeutic activities, Therapeutic exercises  PT Plan: Skilled PT  PT Frequency: 1 time per week  Duration: 10 visits  Onset Date: 24  Certification Period Start Date: 24  Certification Period End Date: 25  Number of Treatments Authorized: Auth required  Rehab Potential: Fair  Plan of Care Agreement: Patient    Referring Physician: General  Reason for Referral: balance, leg weakness  Referred By: promise drake  Past Medical History Relevant to Rehab: came to PT for ankle numbness/weakness for 2 visits earlier this year  Preferred Learning Style: verbal, visual, written      Surgery in the Last 3 months:  No    CPT Codes: Therapeutic Exercise: 88130, Therapeutic Activity: 85963, Neuromuscular Re-Education: 86999, Manual Therapy: 81468, Gait Trainin, and Self-Care/Home Management Trainin    Diagnosis:   Problem List Items Addressed This Visit             ICD-10-CM    Balance disorder - Primary R26.89          Outcome:  5XSTS: 18.79 seconds    OT / PT Evaluation complexity:  Low    Visits Requested: 10    Date Range: 24 to 3/20/25      Nina Fuchs, PT

## 2025-01-10 ENCOUNTER — OFFICE VISIT (OUTPATIENT)
Dept: DERMATOLOGY | Facility: CLINIC | Age: 85
End: 2025-01-10
Payer: MEDICARE

## 2025-01-10 DIAGNOSIS — L57.0 ACTINIC KERATOSIS: ICD-10-CM

## 2025-01-10 DIAGNOSIS — D22.5 MELANOCYTIC NEVUS OF TRUNK: ICD-10-CM

## 2025-01-10 DIAGNOSIS — I48.91 UNSPECIFIED ATRIAL FIBRILLATION (MULTI): ICD-10-CM

## 2025-01-10 DIAGNOSIS — L57.8 DIFFUSE PHOTODAMAGE OF SKIN: ICD-10-CM

## 2025-01-10 DIAGNOSIS — R21 RASH AND OTHER NONSPECIFIC SKIN ERUPTION: Primary | ICD-10-CM

## 2025-01-10 DIAGNOSIS — L82.1 SEBORRHEIC KERATOSIS: ICD-10-CM

## 2025-01-10 DIAGNOSIS — D48.5 NEOPLASM OF UNCERTAIN BEHAVIOR OF SKIN: ICD-10-CM

## 2025-01-10 PROCEDURE — 11105 PUNCH BX SKIN EA SEP/ADDL: CPT | Performed by: DERMATOLOGY

## 2025-01-10 PROCEDURE — 11104 PUNCH BX SKIN SINGLE LESION: CPT | Performed by: DERMATOLOGY

## 2025-01-10 PROCEDURE — 1159F MED LIST DOCD IN RCRD: CPT | Performed by: DERMATOLOGY

## 2025-01-10 PROCEDURE — 1123F ACP DISCUSS/DSCN MKR DOCD: CPT | Performed by: DERMATOLOGY

## 2025-01-10 PROCEDURE — 17004 DESTROY PREMAL LESIONS 15/>: CPT | Performed by: DERMATOLOGY

## 2025-01-10 PROCEDURE — 99204 OFFICE O/P NEW MOD 45 MIN: CPT | Performed by: DERMATOLOGY

## 2025-01-10 RX ORDER — FLUOCINONIDE 1 MG/G
1 CREAM TOPICAL 2 TIMES DAILY
Qty: 240 G | Refills: 0 | Status: SHIPPED | OUTPATIENT
Start: 2025-01-10 | End: 2025-01-24

## 2025-01-10 RX ORDER — AMIODARONE HYDROCHLORIDE 200 MG/1
200 TABLET ORAL DAILY
Qty: 90 TABLET | Refills: 1 | Status: SHIPPED | OUTPATIENT
Start: 2025-01-10

## 2025-01-10 ASSESSMENT — DERMATOLOGY QUALITY OF LIFE (QOL) ASSESSMENT: ARE THERE EXCLUSIONS OR EXCEPTIONS FOR THE QUALITY OF LIFE ASSESSMENT: NO

## 2025-01-10 ASSESSMENT — DERMATOLOGY PATIENT ASSESSMENT
DO YOU HAVE ANY NEW OR CHANGING LESIONS: NO
DO YOU USE SUNSCREEN: OCCASIONALLY
DO YOU USE A TANNING BED: NO
ARE YOU AN ORGAN TRANSPLANT RECIPIENT: NO

## 2025-01-10 ASSESSMENT — ITCH NUMERIC RATING SCALE: HOW SEVERE IS YOUR ITCHING?: 0

## 2025-01-14 ENCOUNTER — APPOINTMENT (OUTPATIENT)
Dept: UROLOGY | Facility: CLINIC | Age: 85
End: 2025-01-14
Payer: MEDICARE

## 2025-01-17 DIAGNOSIS — I50.1 HEART FAILURE, LEFT, WITH LVEF <=30%: ICD-10-CM

## 2025-01-17 DIAGNOSIS — N39.0 RECURRENT UTI: Primary | ICD-10-CM

## 2025-01-17 RX ORDER — CARVEDILOL 3.12 MG/1
3.12 TABLET ORAL
Qty: 180 TABLET | Refills: 2 | Status: SHIPPED | OUTPATIENT
Start: 2025-01-17 | End: 2025-10-14

## 2025-01-17 RX ORDER — CIPROFLOXACIN 500 MG/1
500 TABLET ORAL 2 TIMES DAILY
Qty: 14 TABLET | Refills: 0 | Status: CANCELLED | OUTPATIENT
Start: 2025-01-17 | End: 2025-01-24

## 2025-01-20 ENCOUNTER — APPOINTMENT (OUTPATIENT)
Dept: DERMATOLOGY | Facility: CLINIC | Age: 85
End: 2025-01-20
Payer: MEDICARE

## 2025-01-20 LAB
LABORATORY COMMENT REPORT: NORMAL
PATH REPORT.FINAL DX SPEC: NORMAL
PATH REPORT.GROSS SPEC: NORMAL
PATH REPORT.MICROSCOPIC SPEC OTHER STN: NORMAL
PATH REPORT.RELEVANT HX SPEC: NORMAL
PATH REPORT.TOTAL CANCER: NORMAL

## 2025-01-20 RX ORDER — AMOXICILLIN AND CLAVULANATE POTASSIUM 875; 125 MG/1; MG/1
1 TABLET, FILM COATED ORAL 2 TIMES DAILY
Qty: 14 TABLET | Refills: 0 | Status: SHIPPED | OUTPATIENT
Start: 2025-01-20 | End: 2025-01-27

## 2025-01-21 ENCOUNTER — TELEPHONE (OUTPATIENT)
Dept: DERMATOLOGY | Facility: CLINIC | Age: 85
End: 2025-01-21
Payer: MEDICARE

## 2025-01-21 DIAGNOSIS — R21 RASH AND OTHER NONSPECIFIC SKIN ERUPTION: Primary | ICD-10-CM

## 2025-01-21 RX ORDER — FLUOCINONIDE 0.5 MG/G
OINTMENT TOPICAL
Qty: 120 G | Refills: 0 | Status: SHIPPED | OUTPATIENT
Start: 2025-01-21

## 2025-01-22 ENCOUNTER — DOCUMENTATION (OUTPATIENT)
Dept: PHYSICAL THERAPY | Facility: CLINIC | Age: 85
End: 2025-01-22
Payer: MEDICARE

## 2025-01-22 NOTE — PROGRESS NOTES
"Physical Therapy                 Therapy Communication Note    Patient Name: Greg Comer \"Timothy\"  MRN: [id number]  Department:   Room: Room/bed info not found  Today's Date: 1/22/2025     Discipline: Physical Therapy          Missed Visit Reason:      Missed Time: No Show    Comment:  "

## 2025-01-23 ENCOUNTER — APPOINTMENT (OUTPATIENT)
Dept: CARDIOLOGY | Facility: HOSPITAL | Age: 85
End: 2025-01-23
Payer: MEDICARE

## 2025-01-24 DIAGNOSIS — N39.0 RECURRENT UTI: ICD-10-CM

## 2025-01-24 DIAGNOSIS — R39.9 UTI SYMPTOMS: ICD-10-CM

## 2025-01-27 ENCOUNTER — TREATMENT (OUTPATIENT)
Dept: PHYSICAL THERAPY | Facility: CLINIC | Age: 85
End: 2025-01-27
Payer: MEDICARE

## 2025-01-27 ENCOUNTER — TELEPHONE (OUTPATIENT)
Dept: PRIMARY CARE | Facility: CLINIC | Age: 85
End: 2025-01-27

## 2025-01-27 DIAGNOSIS — R26.89 BALANCE DISORDER: ICD-10-CM

## 2025-01-27 PROCEDURE — 97110 THERAPEUTIC EXERCISES: CPT | Mod: GP

## 2025-01-27 RX ORDER — AMOXICILLIN AND CLAVULANATE POTASSIUM 875; 125 MG/1; MG/1
1 TABLET, FILM COATED ORAL 2 TIMES DAILY
Qty: 14 TABLET | Refills: 0 | Status: SHIPPED | OUTPATIENT
Start: 2025-01-27 | End: 2025-02-03

## 2025-01-27 ASSESSMENT — ENCOUNTER SYMPTOMS
CHILLS: 0
HEMATURIA: 0
DIZZINESS: 0
SHORTNESS OF BREATH: 0
LIGHT-HEADEDNESS: 0
RHINORRHEA: 0
ABDOMINAL PAIN: 0
UNEXPECTED WEIGHT CHANGE: 0
FEVER: 0
EYE PAIN: 0

## 2025-01-27 NOTE — PROGRESS NOTES
Subjective   Reason for Visit: Greg Comer is an 84 y.o. male here for home O2 evaluation and address other chronic issues.    Past Medical, Surgical, and Family History reviewed and updated in chart.         HPI  PMHx sig for CAD s/p PCI (LAD ; 10/2021), LFLG severe AS s/p BAV (10/2021) followed by TAVR (#34 EvolutR; 2/22/22), stage C systolic HF/mixed NICM-ICM/HFrEF with moderate to severe LV dysfunction s/p ICD, pAF on AAD and DOAC therapies,     INITIAL VISIT 2/13/24  Timothy has hx of CAD sp PC to LAD (10/21), severe aortic stenosis sp AVR followed by TAVR, HFrEF sp ICD placement, paroxysmal Afib, DM, HTN , HLD and BPH.  Last echo 2/6/23 demonstrated EF 30-35% with global hypokinesis.  Currently asymptomatic.  Hx of BPH sp TURP.  Recent cysto showed patent channel.  Urinary symptoms controlled.  Recently started dupixent for exzema and itch is much better.  Has history of normocytic anemia that corrected on labs 6/15/23.  A1C 6.0 on 9/27/23.  Has spinal cord stimulator - for back and leg pain, no longer necessary   Walking, weights daily  Good energy and good strength  UTD with eye exam   Colonoscopy has always been normal         INTERVAL HISTORY 4/17/24  Wants to work on exertional shortness of breath.  Has HFrEF, ICD, atrial fibrillation and sp TAVR.  Last echocardiogram 2/6/23 EF 30-35% with global hypokinesis.  No change since last visit, but gets OOB easily and wants to be more active.  Not having chest pain, palpitations or dizziness. No abdominal or ankle edema.  No trouble breathing in bed at night.   Recent workup for gait instability and peripheral neuropathy.   MRI completed 4/16/24 - no stroke, hemorrhage or mass.  Nonspecific volume loss and white matter changes.  Not suggestive of NPH.   Has lumbar DDD treated with spinal cord stimulator.  No longer needs the sitmulator, pain controlled.  Now having numbness in left leg and NCV ordered by neurology.   Wants to work with PT on numbness in left  ankle   Dupixent working well - cleared up mike   Has taken sildenafil and would like a refill   Had some blood per rectum but that has cleared up - colonoscopy scheduled.      INTERVAL HISTORY 6/12/24  Recent workup for anemia showed Iron 41 and Ferritin 20.  Hemoccult was positive and EGD and colonoscopy ordered.  Timothy is on the fence about having the testing done.  Recently experiencing increased shortness of breath.  Echocardiogram was completed and demonstrated ejection fraction 40 to 45%.  1 year ago his ejection fraction was 30 to 35%.  There were no new findings.  He has ongoing workup for pain and numbness in his left ankle.  EMG/nerve conduction studies are scheduled this month.  His symptoms are unchanged.  He tries to stay active and reports his breathing is improving overall.  He uses a rollator or cane for ambulation.  No change in urinary symptoms.        INTERVAL HISTORY 9/19/24  Lifting weights and walking daily - feeling well.  No chest pain and breathing at baseline.  No leg or ankle swelling.   Runny eyes and runny nose continue.  Met with ENT - scope/laryngoscopy normal.  On allegra and flonase and astelin added.   EMG suggestive of L5-S1 neuropathy with possibly superimposed polyeuropathy  MRI LS spine ordered   Walking with rollator and cane - mobility has improved over time.   Having intermittent nausea that responds to elin selzer.  Timothy has iron def anemia with heme pos stool.  After consulting with his family he has decided to move forward with EGD and colonoscopy.     INTERVAL HISTORY 1/29/25:  Patient presents for Medicare face-to-face visit for oxygen evaluation.    Patient Care Team:  Tima Matta MD as PCP - General (Family Medicine)     Review of Systems   Constitutional:  Negative for chills, fever and unexpected weight change.   HENT:  Negative for rhinorrhea.    Eyes:  Negative for pain.   Respiratory:  Negative for shortness of breath.    Cardiovascular:  Negative for  chest pain.   Gastrointestinal:  Negative for abdominal pain.   Genitourinary:  Negative for hematuria.   Skin:  Negative for rash.   Neurological:  Negative for dizziness, syncope and light-headedness.   Psychiatric/Behavioral:  Negative for behavioral problems and suicidal ideas.        Objective   Vitals:  /72   Pulse 71   Temp 36.7 °C (98.1 °F) (Oral)   Wt 81.2 kg (179 lb)   SpO2 92%   BMI 23.62 kg/m²       Physical Exam  Vitals reviewed.   Constitutional:       General: He is not in acute distress.  HENT:      Head: Normocephalic.      Right Ear: External ear normal.      Left Ear: External ear normal.      Nose: No rhinorrhea.      Mouth/Throat:      Mouth: Mucous membranes are moist.   Eyes:      Conjunctiva/sclera: Conjunctivae normal.   Cardiovascular:      Rate and Rhythm: Normal rate and regular rhythm.      Heart sounds: No murmur heard.     No friction rub. No gallop.   Pulmonary:      Effort: No respiratory distress.      Breath sounds: No wheezing, rhonchi or rales.   Abdominal:      General: Bowel sounds are normal. There is no distension.      Palpations: Abdomen is soft.      Tenderness: There is no abdominal tenderness.   Musculoskeletal:      Cervical back: Neck supple.      Right lower leg: No edema.      Left lower leg: No edema.   Skin:     General: Skin is warm and dry.      Capillary Refill: Capillary refill takes less than 2 seconds.   Neurological:      Mental Status: He is alert.      Gait: Gait normal.         Problem List Items Addressed This Visit       A-fib (Multi)    Current Assessment & Plan     PMHx sig for CAD s/p PCI (LAD ; 10/2021), LFLG severe AS s/p BAV (10/2021) followed by TAVR (#34 EvolutR; 2/22/22), stage C systolic HF/mixed NICM-ICM/HFrEF with moderate to severe LV dysfunction s/p ICD, pAF on AAD and DOAC therapies.  -Continue Amiodarone.  -Continue Coreg 6.25mg BID.   -Continue Eliquis.          BPH (benign prostatic hyperplasia)    Current Assessment &  Plan     Symptoms improved after TURP.  Recent cysto unremarkable.  LUTS managed by urology         H/O acute myocardial infarction    H/O prostate cancer    Pulmonary hypertension due to lung diseases and hypoxia (Multi) - Primary    Relevant Orders    Oxygen therapy for home    S/P coronary artery stent placement    S/P TAVR (transcatheter aortic valve replacement)    Controlled type 2 diabetes mellitus with hyperglycemia, without long-term current use of insulin    Current Assessment & Plan     -A1C is below goal.  -Continue Jardiance 10mg daily due to cardiac indications.   -Continue Lipitor 40mg daily.   -Return to clinic in 3 months for point-of-care A1c.           ACC/AHA stage C systolic heart failure    Current Assessment & Plan     -Continue physical therapy.  Patient did not qualify for cardiac rehab.  -Will continue to monitor for GDMT compliance.  -Continue Jardiance 10 mg daily.  -Continue Entresto 49-51 mg twice daily.  -Continue Aldactone 25 mg daily.  -Notable comorbidities: s/p TAVR, s/p cardiac defibrillator/, s/p stent placement.         On anticoagulant therapy    Current Assessment & Plan     -Eliquis is indicated due to atrial fibrillation.   -Will continue to monitor for fall risk.  Benefits of therapy outweigh the risks.         On home oxygen therapy    Current Assessment & Plan     Face-to-face evaluation on 1/29/2025.  -Oxygen saturation of 92% at rest.  -I performed 5-minute walk test with patient, and patient desaturated to 88% on room air.  -Patient has been using oxygen for years.  He uses 2 L/min.  -Patient can walk at least 6 minutes to his car.  -Will fax over records to [employer].  -Associated diagnoses: Pulmonary hypertension due to hypoxemia.            Return to clinic in 3 months for point-of-care A1c.    Medical decision making during this visit had more complexity inherent to evaluation and management associated with medical care services that serve as the continuing focal  point for all needed health care services and/or with medical care services that are part of ongoing care related to a patient's single, serious condition or a complex condition.     I spent 40 minutes in duration for this visit today. This time consisted of chart review, obtaining history, and/or performing the exam as documented above as well as documenting the clinical information for the encounter in the electronic record, discussing treatment options, plans, and/or goals with patient, family, and/or caregiver, refilling medications, updating the electronic record, ordering medicines, lab work, imaging, referrals, and/or procedures as documented above and communicating with other [facility] professionals.

## 2025-01-27 NOTE — PROGRESS NOTES
"Physical Therapy    Physical Therapy Treatment    Patient Name: Greg Comer  MRN: [id number]  Today's Date: 1/27/2025    Time Entry:   Time Calculation  Start Time: 1045  Stop Time: 1130  Time Calculation (min): 45 min     PT Therapeutic Procedures Time Entry  Therapeutic Exercise Time Entry: 38              Non-Billable Time  Non-billable co-treatment time: 7  APPROVED 10 VISITS, 12/20/24-2/28/25  Visit #: 2/10    Assessment:   Pt did well with added exercises today, but did struggle somewhat with form for bridges. Requires rest breaks between each exercise for shortness of breath and fatigue.     Plan:   Leg press, HS curl    Current Problem  1. Balance disorder  Follow Up In Physical Therapy          Subjective    \"Just trying to get rid of this numbness in the L ankle\"  Precautions   Pacemaker, Afib, aortic valve replacement, fall risk   Pain   0/10 initially and at EOS    Objective   Amb with rolling walker      Treatments:   Nustep x 4 min, stopped due to being SOB   Seated toe raises x 20              Seated calf raises x 20  Bridge 2 x 10  Hooklying ball squeeze 5\" x 20  Hooklying clam 5\" x 22 (black)              SLR x 22 ea   LAQ 2# x 22 ea   STS 1 foam 2 x 5, UE use on the way up   Lateral amb x 6 laps, with seated break every 2 laps   Mini squat to black chair x 6                 Goals:  Active       PT Problem       Patient will be independent with home exercise program for home maintenance.        Start:  12/23/24    Expected End:  01/22/25            Pt will be able to maintain feet apart with intermittent FTT for 30 seconds to indicate improving static balance.        Start:  12/23/24    Expected End:  02/06/25            Pt will report increased activity level to over 750 steps a day to indicate improved strength and endurance.        Start:  12/23/24    Expected End:  03/08/25            Pt will be able to perform 5XSTS without cane and under 15 seconds to indicate improved overall strength, " balance, and decrease fall risk.        Start:  12/23/24    Expected End:  03/08/25

## 2025-01-27 NOTE — TELEPHONE ENCOUNTER
Patient states [employer] is sending paperwork so that he can receive his oxygen he want to know if you can sign it and fax it back

## 2025-01-29 ENCOUNTER — OFFICE VISIT (OUTPATIENT)
Dept: PRIMARY CARE | Facility: CLINIC | Age: 85
End: 2025-01-29
Payer: MEDICARE

## 2025-01-29 VITALS
DIASTOLIC BLOOD PRESSURE: 72 MMHG | SYSTOLIC BLOOD PRESSURE: 118 MMHG | BODY MASS INDEX: 23.62 KG/M2 | TEMPERATURE: 98.1 F | HEART RATE: 71 BPM | OXYGEN SATURATION: 92 % | WEIGHT: 179 LBS

## 2025-01-29 DIAGNOSIS — Z79.01 ON ANTICOAGULANT THERAPY: ICD-10-CM

## 2025-01-29 DIAGNOSIS — I50.20 ACC/AHA STAGE C SYSTOLIC HEART FAILURE: ICD-10-CM

## 2025-01-29 DIAGNOSIS — R39.11 BENIGN PROSTATIC HYPERPLASIA WITH URINARY HESITANCY: ICD-10-CM

## 2025-01-29 DIAGNOSIS — Z95.2 S/P TAVR (TRANSCATHETER AORTIC VALVE REPLACEMENT): ICD-10-CM

## 2025-01-29 DIAGNOSIS — I48.20 CHRONIC ATRIAL FIBRILLATION (MULTI): ICD-10-CM

## 2025-01-29 DIAGNOSIS — E11.65 CONTROLLED TYPE 2 DIABETES MELLITUS WITH HYPERGLYCEMIA, WITHOUT LONG-TERM CURRENT USE OF INSULIN: ICD-10-CM

## 2025-01-29 DIAGNOSIS — N40.1 BENIGN PROSTATIC HYPERPLASIA WITH URINARY HESITANCY: ICD-10-CM

## 2025-01-29 DIAGNOSIS — I25.2 H/O ACUTE MYOCARDIAL INFARCTION: ICD-10-CM

## 2025-01-29 DIAGNOSIS — I27.23 PULMONARY HYPERTENSION DUE TO LUNG DISEASES AND HYPOXIA (MULTI): Primary | ICD-10-CM

## 2025-01-29 DIAGNOSIS — Z99.81 ON HOME OXYGEN THERAPY: ICD-10-CM

## 2025-01-29 DIAGNOSIS — Z95.5 S/P CORONARY ARTERY STENT PLACEMENT: ICD-10-CM

## 2025-01-29 DIAGNOSIS — Z85.46 H/O PROSTATE CANCER: ICD-10-CM

## 2025-01-29 PROCEDURE — G2211 COMPLEX E/M VISIT ADD ON: HCPCS | Performed by: STUDENT IN AN ORGANIZED HEALTH CARE EDUCATION/TRAINING PROGRAM

## 2025-01-29 PROCEDURE — 1036F TOBACCO NON-USER: CPT | Performed by: STUDENT IN AN ORGANIZED HEALTH CARE EDUCATION/TRAINING PROGRAM

## 2025-01-29 PROCEDURE — 1159F MED LIST DOCD IN RCRD: CPT | Performed by: STUDENT IN AN ORGANIZED HEALTH CARE EDUCATION/TRAINING PROGRAM

## 2025-01-29 PROCEDURE — 1123F ACP DISCUSS/DSCN MKR DOCD: CPT | Performed by: STUDENT IN AN ORGANIZED HEALTH CARE EDUCATION/TRAINING PROGRAM

## 2025-01-29 PROCEDURE — 1160F RVW MEDS BY RX/DR IN RCRD: CPT | Performed by: STUDENT IN AN ORGANIZED HEALTH CARE EDUCATION/TRAINING PROGRAM

## 2025-01-29 PROCEDURE — 3078F DIAST BP <80 MM HG: CPT | Performed by: STUDENT IN AN ORGANIZED HEALTH CARE EDUCATION/TRAINING PROGRAM

## 2025-01-29 PROCEDURE — 99214 OFFICE O/P EST MOD 30 MIN: CPT | Performed by: STUDENT IN AN ORGANIZED HEALTH CARE EDUCATION/TRAINING PROGRAM

## 2025-01-29 PROCEDURE — 3074F SYST BP LT 130 MM HG: CPT | Performed by: STUDENT IN AN ORGANIZED HEALTH CARE EDUCATION/TRAINING PROGRAM

## 2025-01-30 ENCOUNTER — TELEPHONE (OUTPATIENT)
Dept: PRIMARY CARE | Facility: CLINIC | Age: 85
End: 2025-01-30

## 2025-01-30 ENCOUNTER — APPOINTMENT (OUTPATIENT)
Dept: DERMATOLOGY | Facility: CLINIC | Age: 85
End: 2025-01-30
Payer: MEDICARE

## 2025-01-30 DIAGNOSIS — L57.8 DIFFUSE PHOTODAMAGE OF SKIN: ICD-10-CM

## 2025-01-30 DIAGNOSIS — L85.3 XEROSIS CUTIS: ICD-10-CM

## 2025-01-30 DIAGNOSIS — L82.1 SEBORRHEIC KERATOSIS: ICD-10-CM

## 2025-01-30 DIAGNOSIS — L57.0 ACTINIC KERATOSIS: ICD-10-CM

## 2025-01-30 DIAGNOSIS — L30.9 DERMATITIS: Primary | ICD-10-CM

## 2025-01-30 DIAGNOSIS — Z87.2 HISTORY OF ACTINIC KERATOSES: ICD-10-CM

## 2025-01-30 PROCEDURE — 17000 DESTRUCT PREMALG LESION: CPT | Performed by: DERMATOLOGY

## 2025-01-30 PROCEDURE — 1159F MED LIST DOCD IN RCRD: CPT | Performed by: DERMATOLOGY

## 2025-01-30 PROCEDURE — 99214 OFFICE O/P EST MOD 30 MIN: CPT | Performed by: DERMATOLOGY

## 2025-01-30 PROCEDURE — 1123F ACP DISCUSS/DSCN MKR DOCD: CPT | Performed by: DERMATOLOGY

## 2025-01-30 RX ORDER — TRIAMCINOLONE ACETONIDE 1 MG/G
CREAM TOPICAL
Qty: 454 G | Refills: 1 | Status: SHIPPED | OUTPATIENT
Start: 2025-01-30

## 2025-01-30 NOTE — TELEPHONE ENCOUNTER
I spoke to the patient on the phone about the update.  I faxed over the updated notes to [employer].    Tima Matta MD, [facility]  Physician  Department of Family Medicine of [facility] - Primary [facility]

## 2025-01-30 NOTE — TELEPHONE ENCOUNTER
PT called in and stated that the oxygen company [employer] needs to know if patient is able to walk at least 6 minutes to and from his car please advise or you can give PT a call at [phone]

## 2025-01-31 ENCOUNTER — APPOINTMENT (OUTPATIENT)
Dept: UROLOGY | Facility: CLINIC | Age: 85
End: 2025-01-31
Payer: MEDICARE

## 2025-01-31 LAB — BACTERIA UR CULT: ABNORMAL

## 2025-01-31 NOTE — TELEPHONE ENCOUNTER
I spoke to the patient on the phone after speaking with the physician representative from [employer].  Unfortunately, patient does not qualify for oxygen at this time.    Tima Matta MD, [facility]  Physician  Department of Family Medicine of [facility] - Primary Care

## 2025-02-02 NOTE — PROGRESS NOTES
"Shelley Comer \"Timothy\" is a 84 y.o. male who presents for the following: Follow-up.  The patient and his wife report the rash on his body started a few years ago when he was wearing a Holter monitor and then developed a rash in the area of the monitor.  It started getting worse and progressed outside the initial area and across his back, chest, and abdomen.  The areas are red, scaly, and itchy.    He underwent biopsy of a lesion on his right flank by Dr. Dar Gomes at the [facility] on 3/7/23, which revealed \"spongiotic dermatitis,\" and DIF studies from his right flank were negative.  He started systemic therapy with Dupixent prescribed by Dr. Alvarez 1 year ago and alternates using triamcinolone 0.1% cream twice a day and fluocinolone oil, but he thinks his skin is about the same as it was before starting Dupixent.    Punch biopsy of a representative lesion on his left flank performed at his initial visit in our office on 1/10/25 revealed \"very mild spongiosis with a mild superficial perivascular lymphocytic infiltrate,\" for which the histologic differential diagnosis includes atopic dermatitis, nummular dermatitis, allergic contact dermatitis, id reaction, and eczematous drug reaction.    Since his last visit, he has been applying fluocinonide 0.05% cream twice daily for the past 2 weeks with improvement, although the areas have not completely resolved.  He denies any new areas of involvement since his last visit.  The patient and his wife state he did not started taking any new medications around the time the rash began, including any prescription or over-the-counter medications, herbals, vitamins, or other supplements.    Of note, punch biopsy of a suspicious lesion on his left medial cheek also performed at his initial visit on 1/10/2025 revealed an actinic keratosis.  He states this biopsy site also healed well, and he denies any new, changing, or concerning skin lesions since his last " visit; no bleeding, itching, or burning lesions.      Review of Systems:  No other skin or systemic complaints other than what is documented elsewhere in the note.    The following portions of the chart were reviewed this encounter and updated as appropriate:       Skin Cancer History  No skin cancer on file.    Specialty Problems    None      Past Dermatologic / Past Relevant Medical History:    - history of AKs  - reported history of eczema  - no history of psoriasis or skin cancer    Family History:    No family history of eczema, psoriasis, or skin cancer    Social History:    The patient states he works as an  and was seen with his wife in the office today    Allergies:  Coenzyme q10    Current Medications / CAM's:    Current Outpatient Medications:     amiodarone (Pacerone) 200 mg tablet, TAKE 1 TABLET BY MOUTH DAILY, Disp: 90 tablet, Rfl: 1    amoxicillin-pot clavulanate (Augmentin) 875-125 mg tablet, Take 1 tablet by mouth 2 times a day for 7 days., Disp: 14 tablet, Rfl: 0    atorvastatin (Lipitor) 40 mg tablet, TAKE 1 TABLET BY MOUTH EVERY DAY, Disp: 90 tablet, Rfl: 3    azelastine (Astelin) 137 mcg (0.1 %) nasal spray, USE 2 SPRAYS INTO EACH NOSTRIL TWICE A DAY, Disp: 30 mL, Rfl: 1    carvedilol (Coreg) 3.125 mg tablet, TAKE 1 TABLET (3.125 MG) BY MOUTH 2 TIMES A DAY WITH MEALS., Disp: 180 tablet, Rfl: 2    carvedilol (Coreg) 6.25 mg tablet, Take 1 tablet (6.25 mg) by mouth 2 times daily (morning and late afternoon)., Disp: 60 tablet, Rfl: 11    cetirizine (ZyrTEC) 10 mg tablet, Take 1 tablet (10 mg) by mouth once daily., Disp: , Rfl:     clopidogrel (Plavix) 75 mg tablet, TAKE 1 TABLET BY MOUTH ONCE DAILY., Disp: 90 tablet, Rfl: 1    crisaborole (Eucrisa) 2 % ointment, Apply 1 Application topically 2 times a day., Disp: , Rfl:     dupilumab (Dupixent) 300 mg/2 mL injection, Inject 2 mL (300 mg) under the skin 1 time., Disp: , Rfl:     Eliquis 5 mg tablet, TAKE 1 TABLET BY MOUTH EVERY 12  HOURS, Disp: 60 tablet, Rfl: 11    empagliflozin (Jardiance) 10 mg, Take 1 tablet (10 mg) by mouth once daily., Disp: 90 tablet, Rfl: 3    ferrous sulfate, 325 mg ferrous sulfate, (iron) tablet, Take 1 tablet (325 mg) by mouth once daily with breakfast., Disp: , Rfl:     fexofenadine (Allegra) 180 mg tablet, TAKE 1 TABLET (180 MG) BY MOUTH ONCE DAILY AS NEEDED (ALLERGY SYMPTOMS)., Disp: 90 tablet, Rfl: 0    fish oil concentrate (Omega-3) 120-180 mg capsule, Take 1 capsule (1 g) by mouth once daily., Disp: , Rfl:     fluocinonide (Lidex) 0.05 % ointment, Apply twice daily to affected areas of body (avoid face, groin, body folds) for 2 weeks, taper to weekends only for persistent areas; repeat every 6-8 weeks as needed for flares, Disp: 120 g, Rfl: 0    furosemide (Lasix) 20 mg tablet, Take 1 tablet (20 mg) by mouth once daily as needed., Disp: , Rfl:     ipratropium (Atrovent) 21 mcg (0.03 %) nasal spray, ADMINISTER 2 SPRAYS INTO EACH NOSTRIL EVERY 12 HOURS., Disp: 90 mL, Rfl: 2    mv-min/FA/vit K/lutein/zeaxant (PRESERVISION AREDS 2 PLUS MV ORAL), Take by mouth., Disp: , Rfl:     nitroglycerin (Nitrostat) 0.4 mg SL tablet, Place 1 tablet (0.4 mg) under the tongue every 5 minutes if needed. UP TO 3 DOSES AS NEEDED FOR CHEST PAIN., Disp: , Rfl:     ondansetron (Zofran) 4 mg tablet, TAKE 1 TABLET BY MOUTH EVERY DAY AS NEEDED, Disp: 15 tablet, Rfl: 0    sacubitriL-valsartan (Entresto) 49-51 mg tablet, Take 1 tablet by mouth 2 times a day., Disp: 60 tablet, Rfl: 11    spironolactone (Aldactone) 25 mg tablet, TAKE 1 TABLET DAILY, Disp: 90 tablet, Rfl: 2    traZODone (Desyrel) 100 mg tablet, Take 1 tablet (100 mg) by mouth once daily at bedtime., Disp: 90 tablet, Rfl: 1    triamcinolone (Kenalog) 0.1 % cream, Apply 1 Application topically., Disp: , Rfl:     vit C/E/Zn/coppr/lutein/zeaxan (PRESERVISION AREDS-2 ORAL), Take 2 capsules by mouth once daily., Disp: , Rfl:     triamcinolone (Kenalog) 0.1 % cream, Apply twice  daily to affected areas of body (avoid face, groin, body folds) for 2 weeks, then taper to twice daily on weekends only; repeat every 6-8 weeks as needed for flares, Disp: 454 g, Rfl: 1     Objective   Well appearing patient in no apparent distress; mood and affect are within normal limits.    A full examination was performed including scalp, face, eyes, ears, nose, lips, neck, chest, axillae, abdomen, back, bilateral upper extremities, and bilateral lower extremities. All findings within normal limits unless otherwise noted below.    Assessment/Plan   1. Dermatitis  Right Upper Back  On his trunk and extremities, worst on his abdomen and bilateral flanks, there are many pink to slightly erythematous, slightly scaly, thin papules and plaques as well as several much larger plaques with excoriations as well as many pink to hyperpigmented macules and patches consistent with postinflammatory hyperpigmentation    Erythematous, scaly plaques - persistent flare on trunk and extremities.  Based on the patient's history, recent exam and repeat exam today, and the histologic findings in his recent biopsy, the favored clinico-pathologic differential diagnosis for these lesions includes possibly eczema versus eczematous drug eruption versus less likely allergic contact dermatitis.  The nature of each of these conditions and management options were discussed extensively with the patient and his wife in the office today.    At this time, for symptomatic relief, I recommend switching his topical steroid therapy to Triamcinolone 0.1% cream out of a 1-pound jar, which the patient was instructed to apply twice daily to the affected areas of his body (avoid face, groin, body folds) for the next 2 week, followed by taper to twice daily on weekends only for persistent areas; the patient may repeat treatment in a 2-week burst-and-taper fashion every 6-8 weeks as needed for future flares.  I also emphasized the importance of dry,  sensitive skin care, including the use of a mild soap, such as Dove, and frequent and aggressive moisturization, at least twice daily and immediately following showers or baths, with recommended over-the-counter moisturizing creams, such as Eucerin, Cetaphil, Cerave, or Aveeno, or Vaseline or Aquaphor ointments.  The risks, benefits, and side effects of this medication, including possible skin atrophy with overuse of topical steroids, were discussed.  If this condition has not resolved within the next 2-3 months, he was instructed to call our office to schedule a return visit for re-evaluation and further management considerations if indicated at that time.  The patient expressed understanding and is in agreement with this plan.  Of note, his sutures were removed in the office today as well.    triamcinolone (Kenalog) 0.1 % cream - Right Upper Back  Apply twice daily to affected areas of body (avoid face, groin, body folds) for 2 weeks, then taper to twice daily on weekends only; repeat every 6-8 weeks as needed for flares    2. Actinic keratosis  Head - Anterior (Face)  On the patient's left medial cheek, there is a pink, well-healed scar at the recent biopsy site with a surrounding rim of erythema, grittiness, and scale    Biopsy-proven Actinic Keratosis -left medial cheek, present on the peripheral margin.  The pre-cancerous nature of this lesion, its presence on the margin(s), and treatment options were discussed with the patient today.  At this time, I recommend treatment with liquid nitrogen cryotherapy.  The patient expressed understanding, is in agreement with this plan, and wishes to proceed with cryotherapy today.  Of note, his suture was removed in the office today as well.    Destr of lesion - Head - Anterior (Face)  Complexity: simple    Destruction method: cryotherapy    Informed consent: discussed and consent obtained    Lesion destroyed using liquid nitrogen: Yes    Region frozen until ice ball  extended beyond lesion: Yes    Cryotherapy cycles:  1  Outcome: patient tolerated procedure well with no complications    Post-procedure details: wound care instructions given      3. Seborrheic keratosis  Scattered on the patient's face, neck, trunk, and extremities, there are multiple tan- to light brown-colored, hyperkeratotic, stuck-on appearing papules of varying size and shape    Seborrheic Keratoses - the benign nature of these lesions was discussed with the patient today and reassurance provided.  No treatment is medically indicated for these lesions at this time.    4. Xerosis cutis  Diffuse generalized dry, scaly skin.    Xerosis.  I emphasized the importance of dry, sensitive skin care, including the use of a mild soap, such as Dove, and frequent and aggressive moisturization, at least twice daily and immediately following showers or baths, with recommended over-the-counter moisturizing creams, such as Eucerin, Cetaphil, Cerave, or Aveeno, or Vaseline or Aquaphor ointments.  The patient was also provided an informational hand-out today containing further details on dry skin care.    5. History of actinic keratoses  There is evidence of photodamage in sun-exposed areas.    History of actinic keratoses and photodamage.  The signs and symptoms of skin cancer were reviewed and the patient was advised to practice sun protection and sun avoidance, use daily sunscreen, and perform regular self skin exams.  I will have the patient return to our office in 6 to 12 months for routine follow-up and skin exam, and the patient was instructed to call our office should the patient notice any new, changing, symptomatic, or otherwise concerning skin lesions before then.  The patient expressed understanding and is in agreement with this plan.    6. Diffuse photodamage of skin  Diffuse photodamage with actinic changes with telangiectasia and mottled pigmentation in sun-exposed areas.    Photodamage.  The signs and symptoms  of skin cancer were reviewed and the patient was advised to practice sun protection and sun avoidance, use daily sunscreen, and perform regular self skin exams.  Sun protection was discussed, including avoiding the mid-day sun, wearing a sunscreen with SPF at least 50, and stressing the need for reapplication of sunscreen and applying more than they think they need.

## 2025-02-03 ENCOUNTER — APPOINTMENT (OUTPATIENT)
Dept: PODIATRY | Facility: CLINIC | Age: 85
End: 2025-02-03
Payer: MEDICARE

## 2025-02-03 DIAGNOSIS — E11.9 ENCOUNTER FOR DIABETIC FOOT EXAM (MULTI): Primary | ICD-10-CM

## 2025-02-03 DIAGNOSIS — B37.49 YEAST UTI: ICD-10-CM

## 2025-02-03 DIAGNOSIS — B35.1 ONYCHOMYCOSIS: ICD-10-CM

## 2025-02-03 DIAGNOSIS — I73.9 PVD (PERIPHERAL VASCULAR DISEASE) (CMS-HCC): ICD-10-CM

## 2025-02-03 PROCEDURE — 99213 OFFICE O/P EST LOW 20 MIN: CPT | Performed by: PODIATRIST

## 2025-02-03 RX ORDER — FLUCONAZOLE 150 MG/1
150 TABLET ORAL DAILY
Qty: 3 TABLET | Refills: 0 | Status: SHIPPED | OUTPATIENT
Start: 2025-02-03 | End: 2025-02-06

## 2025-02-03 NOTE — PROGRESS NOTES
History of Present Illness:   Patient states they are here for foot exam  States he needs help trimming nails, debrides nails at home.   No other pedal complaints    Past Medical History  Past Medical History:   Diagnosis Date    Aortic stenosis     valve replacement followed by TAVR    Atrial fibrillation (Multi)     amoidarone and DOAC    BPH (benign prostatic hyperplasia)     CAD (coronary artery disease)     SAMM to LAD    Congestive heart failure (CHF)     Stage C    Eczema     recently started dupixent    History of cardiac defibrillator placement     Hyperlipemia     Insulin resistance     Personal history of other diseases of the circulatory system 02/16/2022    History of aortic valve stenosis    Recurrent UTI     Right bundle branch block        Medications and Allergies have been reviewed.    Review Of Systems:  GENERAL: No weight loss, malaise or fevers.  HEENT: Negative for frequent or significant headaches,   RESPIRATORY: Negative for cough, wheezing or shortness of breath.  CARDIOVASCULAR: Negative for chest pain, leg swelling or palpitations.    Examination of Both Lower Extremities:   Objective:   Vasc: DP and PT pulses are palpable bilateral.  CFT is less than 3 seconds bilateral.  Skin temperature is warm to cool proximal to distal bilateral.  Varicosities noted. Mild edema noted b/l    Neuro: Vibratory, light touch and proprioception are intact bilateral.    Derm: Nails 1-5 bilateral are intact, non elongated, discolored and thick.   Skin is supple with normal texture and turgor noted.  Webspaces are clean, dry and intact bilateral.      Ortho: Muscle strength is 5/5 for all pedal groups tested.      1. Encounter for diabetic foot exam (Multi)        2. Onychomycosis        3. PVD (peripheral vascular disease) (CMS-[facility])          Patient exam and eval  Debrided nails, not long. Pt debrides at home  Ok to safely trim at home   Lower pedal risk  Fu yearly for DM exam  A1C reviewed  Patient was in  agreement to this plan. All questions answered.      Angela Sykes DPM  [phone]  Option 2  Fax: [phone]

## 2025-02-13 ENCOUNTER — APPOINTMENT (OUTPATIENT)
Dept: CARDIOLOGY | Facility: HOSPITAL | Age: 85
End: 2025-02-13
Payer: MEDICARE

## 2025-02-14 DIAGNOSIS — J34.89 NASAL AND SINUS DISCHARGE: ICD-10-CM

## 2025-02-14 DIAGNOSIS — J34.2 NASAL SEPTAL DEVIATION: ICD-10-CM

## 2025-02-14 RX ORDER — MINERAL OIL
180 ENEMA (ML) RECTAL DAILY PRN
Qty: 90 TABLET | Refills: 0 | Status: SHIPPED | OUTPATIENT
Start: 2025-02-14 | End: 2026-02-14

## 2025-02-19 ENCOUNTER — APPOINTMENT (OUTPATIENT)
Dept: UROLOGY | Facility: CLINIC | Age: 85
End: 2025-02-19
Payer: MEDICARE

## 2025-02-20 DIAGNOSIS — R21 RASH AND OTHER NONSPECIFIC SKIN ERUPTION: ICD-10-CM

## 2025-02-21 ENCOUNTER — APPOINTMENT (OUTPATIENT)
Dept: PHYSICAL THERAPY | Facility: CLINIC | Age: 85
End: 2025-02-21
Payer: MEDICARE

## 2025-02-24 RX ORDER — FLUOCINONIDE 1 MG/G
CREAM TOPICAL
Qty: 120 G | Refills: 1 | OUTPATIENT
Start: 2025-02-24

## 2025-03-05 DIAGNOSIS — I42.9 CARDIOMYOPATHY, UNSPECIFIED: ICD-10-CM

## 2025-03-05 DIAGNOSIS — I50.20 UNSPECIFIED SYSTOLIC (CONGESTIVE) HEART FAILURE: ICD-10-CM

## 2025-03-05 DIAGNOSIS — I25.2 OLD MYOCARDIAL INFARCTION: ICD-10-CM

## 2025-03-05 RX ORDER — SACUBITRIL AND VALSARTAN 49; 51 MG/1; MG/1
1 TABLET, FILM COATED ORAL 2 TIMES DAILY
Qty: 60 TABLET | Refills: 11 | Status: SHIPPED | OUTPATIENT
Start: 2025-03-05

## 2025-03-05 RX ORDER — CLOPIDOGREL BISULFATE 75 MG/1
75 TABLET ORAL DAILY
Qty: 90 TABLET | Refills: 1 | Status: SHIPPED | OUTPATIENT
Start: 2025-03-05

## 2025-03-05 RX ORDER — SPIRONOLACTONE 25 MG/1
25 TABLET ORAL DAILY
Qty: 90 TABLET | Refills: 2 | Status: SHIPPED | OUTPATIENT
Start: 2025-03-05

## 2025-03-07 ENCOUNTER — APPOINTMENT (OUTPATIENT)
Dept: PHYSICAL THERAPY | Facility: CLINIC | Age: 85
End: 2025-03-07
Payer: MEDICARE

## 2025-03-07 ENCOUNTER — TELEPHONE (OUTPATIENT)
Dept: DERMATOLOGY | Facility: CLINIC | Age: 85
End: 2025-03-07
Payer: MEDICARE

## 2025-03-07 DIAGNOSIS — R21 RASH AND OTHER NONSPECIFIC SKIN ERUPTION: ICD-10-CM

## 2025-03-07 RX ORDER — FLUOCINONIDE 0.5 MG/G
OINTMENT TOPICAL
Qty: 60 G | Refills: 1 | Status: SHIPPED | OUTPATIENT
Start: 2025-03-07

## 2025-03-07 NOTE — TELEPHONE ENCOUNTER
Spoke with pt about using good rx for his Fluocinonide 0.05% oint 60 gm tube -- [facility] seems to be the least expensive pt is ok paying out of pocket for the oint --[facility] does not use good [facility] but gives their own discount --pt 60 gm will be 25>99 .. Pt ok using smaller tube ,, please send to [facility] [place] pharmacy in chart ..

## 2025-03-11 ENCOUNTER — TELEPHONE (OUTPATIENT)
Dept: DERMATOLOGY | Facility: CLINIC | Age: 85
End: 2025-03-11
Payer: MEDICARE

## 2025-03-13 DIAGNOSIS — I50.1 HEART FAILURE, LEFT, WITH LVEF <=30%: ICD-10-CM

## 2025-03-13 DIAGNOSIS — I42.9 CARDIOMYOPATHY, UNSPECIFIED TYPE (MULTI): Primary | ICD-10-CM

## 2025-03-21 LAB
ALBUMIN SERPL-MCNC: 4.4 G/DL (ref 3.6–5.1)
BNP SERPL-MCNC: 80 PG/ML
BUN SERPL-MCNC: 21 MG/DL (ref 7–25)
BUN/CREAT SERPL: ABNORMAL (CALC) (ref 6–22)
CALCIUM SERPL-MCNC: 9.1 MG/DL (ref 8.6–10.3)
CHLORIDE SERPL-SCNC: 97 MMOL/L (ref 98–110)
CO2 SERPL-SCNC: 29 MMOL/L (ref 20–32)
CREAT SERPL-MCNC: 1.03 MG/DL (ref 0.7–1.22)
EGFRCR SERPLBLD CKD-EPI 2021: 71 ML/MIN/1.73M2
GLUCOSE SERPL-MCNC: 86 MG/DL (ref 65–139)
PHOSPHATE SERPL-MCNC: 3.9 MG/DL (ref 2.1–4.3)
POTASSIUM SERPL-SCNC: 4.7 MMOL/L (ref 3.5–5.3)
SODIUM SERPL-SCNC: 137 MMOL/L (ref 135–146)

## 2025-03-24 ENCOUNTER — OFFICE VISIT (OUTPATIENT)
Dept: CARDIOLOGY | Facility: HOSPITAL | Age: 85
End: 2025-03-24
Payer: MEDICARE

## 2025-03-24 ENCOUNTER — APPOINTMENT (OUTPATIENT)
Dept: PHYSICAL THERAPY | Facility: CLINIC | Age: 85
End: 2025-03-24
Payer: MEDICARE

## 2025-03-24 VITALS
RESPIRATION RATE: 20 BRPM | TEMPERATURE: 96.8 F | HEART RATE: 68 BPM | SYSTOLIC BLOOD PRESSURE: 125 MMHG | DIASTOLIC BLOOD PRESSURE: 58 MMHG | OXYGEN SATURATION: 95 %

## 2025-03-24 DIAGNOSIS — I25.10 ASCVD (ARTERIOSCLEROTIC CARDIOVASCULAR DISEASE): ICD-10-CM

## 2025-03-24 DIAGNOSIS — I50.20 ACC/AHA STAGE C SYSTOLIC HEART FAILURE: Primary | ICD-10-CM

## 2025-03-24 DIAGNOSIS — Z95.2 S/P TAVR (TRANSCATHETER AORTIC VALVE REPLACEMENT): ICD-10-CM

## 2025-03-24 DIAGNOSIS — I48.0 PAROXYSMAL ATRIAL FIBRILLATION (MULTI): ICD-10-CM

## 2025-03-24 PROCEDURE — 1123F ACP DISCUSS/DSCN MKR DOCD: CPT | Performed by: INTERNAL MEDICINE

## 2025-03-24 PROCEDURE — 99214 OFFICE O/P EST MOD 30 MIN: CPT | Performed by: INTERNAL MEDICINE

## 2025-03-24 PROCEDURE — 1160F RVW MEDS BY RX/DR IN RCRD: CPT | Performed by: INTERNAL MEDICINE

## 2025-03-24 PROCEDURE — 1036F TOBACCO NON-USER: CPT | Performed by: INTERNAL MEDICINE

## 2025-03-24 PROCEDURE — 1126F AMNT PAIN NOTED NONE PRSNT: CPT | Performed by: INTERNAL MEDICINE

## 2025-03-24 PROCEDURE — 3078F DIAST BP <80 MM HG: CPT | Performed by: INTERNAL MEDICINE

## 2025-03-24 PROCEDURE — 1159F MED LIST DOCD IN RCRD: CPT | Performed by: INTERNAL MEDICINE

## 2025-03-24 PROCEDURE — 3074F SYST BP LT 130 MM HG: CPT | Performed by: INTERNAL MEDICINE

## 2025-03-24 ASSESSMENT — PAIN SCALES - GENERAL: PAINLEVEL_OUTOF10: 0-NO PAIN

## 2025-03-24 NOTE — PROGRESS NOTES
[facility]  Primary Care Physician: Tima Matta MD  Referring Provider/Cardiologist: n/a     Date of Visit: 03/24/2025 10:40 AM EDT  [facility] visit[facility]     HPI:   Mr. Comer is an 84M with a PMHx sig for CAD s/p PCI (LAD ; 10/2021), LFLG severe AS s/p BAV (10/2021) followed by TAVR (#34 EvolutR; 2/22/22), stage C systolic HF/mixed NICM-ICM/HFmrEF with mild LV dysfunction s/p ICD, pAF on AAD and DOAC therapies, and BPH who returns to the Advanced Heart Failure clinic for ongoing evaluation and management of his cardiomyopathy.     Interval hx:   He continue to report exertional dyspnea when walking. He denies chest pain, pressure, PND, orthopnea, LE edema, palpitations, light headedness, dizziness, or syncope.     Hospitalizations: Denies   Medication adherence: Reports adherence       PM/SHx:  CAD s/p PCI (LAD ; 10/2021), LFLG severe AS s/p BAV (10/2021) followed by TAVR (#34 EvolutR; 2/22/22), stage C systolic HF/mixed NICM-ICM/HFmrEF with mild LV dysfunction s/p ICD, pAF on AAD and DOAC therapies, BPH    SocHx:   , lives with his wife in [place]  Denies smoking or illicits. Reports ETOH (glass of wine every night)    FamHx:   Denies CAD/cardiomyopathy      Current Outpatient Medications   Medication Sig Dispense Refill    amiodarone (Pacerone) 200 mg tablet TAKE 1 TABLET BY MOUTH DAILY 90 tablet 1    atorvastatin (Lipitor) 40 mg tablet TAKE 1 TABLET BY MOUTH EVERY DAY 90 tablet 3    azelastine (Astelin) 137 mcg (0.1 %) nasal spray USE 2 SPRAYS INTO EACH NOSTRIL TWICE A DAY 30 mL 1    carvedilol (Coreg) 3.125 mg tablet TAKE 1 TABLET (3.125 MG) BY MOUTH 2 TIMES A DAY WITH MEALS. 180 tablet 2    carvedilol (Coreg) 6.25 mg tablet Take 1 tablet (6.25 mg) by mouth 2 times daily (morning and late afternoon). 60 tablet 11    cetirizine (ZyrTEC) 10 mg tablet Take 1 tablet (10 mg) by mouth once daily.      clopidogrel  (Plavix) 75 mg tablet TAKE 1 TABLET BY MOUTH ONCE DAILY. 90 tablet 1    crisaborole (Eucrisa) 2 % ointment Apply 1 Application topically 2 times a day.      dupilumab (Dupixent) 300 mg/2 mL injection Inject 2 mL (300 mg) under the skin 1 time.      Eliquis 5 mg tablet TAKE 1 TABLET BY MOUTH EVERY 12 HOURS 60 tablet 11    empagliflozin (Jardiance) 10 mg Take 1 tablet (10 mg) by mouth once daily. 90 tablet 3    Entresto 49-51 mg tablet TAKE 1 TABLET BY MOUTH TWICE A DAY 60 tablet 11    ferrous sulfate, 325 mg ferrous sulfate, (iron) tablet Take 1 tablet (325 mg) by mouth once daily with breakfast.      fexofenadine (Allegra) 180 mg tablet TAKE 1 TABLET (180 MG) BY MOUTH ONCE DAILY AS NEEDED (ALLERGY SYMPTOMS). 90 tablet 0    fish oil concentrate (Omega-3) 120-180 mg capsule Take 1 capsule (1 g) by mouth once daily.      fluocinonide (Lidex) 0.05 % ointment Apply twice daily to affected areas of body (avoid face, groin, body folds) for 2 weeks, taper to weekends only for persistent areas; repeat every 6-8 weeks as needed for flares 60 g 1    furosemide (Lasix) 20 mg tablet Take 1 tablet (20 mg) by mouth once daily as needed.      ipratropium (Atrovent) 21 mcg (0.03 %) nasal spray ADMINISTER 2 SPRAYS INTO EACH NOSTRIL EVERY 12 HOURS. 90 mL 2    mv-min/FA/vit K/lutein/zeaxant (PRESERVISION AREDS 2 PLUS MV ORAL) Take by mouth.      nitroglycerin (Nitrostat) 0.4 mg SL tablet Place 1 tablet (0.4 mg) under the tongue every 5 minutes if needed. UP TO 3 DOSES AS NEEDED FOR CHEST PAIN.      ondansetron (Zofran) 4 mg tablet TAKE 1 TABLET BY MOUTH EVERY DAY AS NEEDED 15 tablet 0    spironolactone (Aldactone) 25 mg tablet TAKE 1 TABLET DAILY 90 tablet 2    traZODone (Desyrel) 100 mg tablet Take 1 tablet (100 mg) by mouth once daily at bedtime. 90 tablet 1    triamcinolone (Kenalog) 0.1 % cream Apply 1 Application topically.      triamcinolone (Kenalog) 0.1 % cream Apply twice daily to affected areas of body (avoid face, groin,  body folds) for 2 weeks, then taper to twice daily on weekends only; repeat every 6-8 weeks as needed for flares 454 g 1    vit C/E/Zn/coppr/lutein/zeaxan (PRESERVISION AREDS-2 ORAL) Take 2 capsules by mouth once daily.       No current facility-administered medications for this visit.       Allergies   Allergen Reactions    Coenzyme Q10 Runny nose     Other Reaction(s): Intolerance      Insomnia       Visit Vitals  /58   Pulse 68   Temp 36 °C (96.8 °F) (Core)   Resp 20   SpO2 95%   Smoking Status Never        Physical Exam:  On exam Mr. Comer appears his stated age, is alert and oriented x3, and in no acute distress. His sclera are anicteric and his oropharynx has moist mucous membranes. His neck is supple and without thyromegaly. The JVP is ~6 cm of water above the right atrium. His cardiac exam has regular rhythm, normal S1, S2. No S3/4. There are no murmurs. His lungs are clear to auscultation bilaterally and there is no dullness to percussion. His abdomen is soft, nontender with normoactive bowel sounds. There is no HJR. The extremities are warm and without edema. The skin is dry. There is no rash present. The distal pulses are 2+ in all four extremities. His mood and affect are appropriate for todays encounter.       Cardiac Labs/Diagnostics:    Lab Results   Component Value Date    CREATININE 1.03 03/20/2025    BUN 21 03/20/2025     03/20/2025    K 4.7 03/20/2025    CL 97 (L) 03/20/2025    CO2 29 03/20/2025        Recent Labs     10/31/24  1325 10/08/24  1322 02/01/24  1254 02/06/23  1053   CHOL 130 145 143 182   LDLF  --   --   --  83   LDLCALC 37 53 50  --    HDL 54.0 59.6 64.1 64.6   TRIG 197* 161* 147 171*       Recent Labs     03/20/25  1321 04/22/24  1259 02/01/24  1254 02/06/23  1053 04/28/22  1201   BNP 80 180* 138* 154* 442*       Echo (6/5/24):  1. Left ventricular systolic function is mildly decreased with a 40-45% estimated ejection fraction.  2. Spectral Doppler shows an impaired  relaxation pattern of left ventricular diastolic filling.  3. There is a transcatheter aortic valve replacement.    ECG (5/11/23):  Sinus rhythm (HR 72), RBBB, LAFB, LVH    Echo (2/6/23):  1. Left ventricular systolic function is moderately to severely decreased with a 30-35% estimated ejection fraction.  2. There is a transcatheter aortic valve replacement.  3. Compared with study from 3/22/2022, peak gradient of aortic valve is less (previously 15 mmHg).    Echo (3/22/22):  1. The left ventricular systolic function is moderately decreased with a 30-35% estimated ejection fraction.  2. There is global hypokinesis of the left ventricle with minor regional variations.  3. Spectral Doppler shows an impaired relaxation pattern of left ventricular diastolic filling.  4. No left ventricular thrombus visualized.  5. The left atrium is severely dilated.  6. RVSP within normal limits.  7. There is a transcatheter aortic valve replacement.    ECG (2/23/22):  Sinus rhythm (HR 75) with PACs, LVH with repol       Impression/Plan:  Mr. Comer is an 84M with a PMHx sig for CAD s/p PCI (LAD ; 10/2021), LFLG severe AS s/p BAV (10/2021) followed by TAVR (#34 EvolutR; 2/22/22), stage C systolic HF/mixed NICM-ICM/HFmrEF with mild LV dysfunction s/p ICD, pAF on AAD and DOAC therapies, and BPH who returns to the Advanced Heart Failure clinic for ongoing evaluation and management of his cardiomyopathy. At the current time he has functional class II symptoms and appears euvolemic on exam.     1) Stage C chronic systolic HF/mixed NICM-ICM/HFmrEF with mild LV dysfunction (LVEF 30-35-->40-45%; 6/2024) s/p ICD  Continues to experience exertional dyspnea. Most recent BNP has normalized (80; 3/2025).   -c/w carvedilol 6.25 mg bid, entresto 49/51 mg bid, jardiance 10 mg daily, spironolactone 25 mg daily, furosemide 20 mg daily/prn    2) LFLG severe AS s/p BAV (10/2021) followed by TAVR (#34 EvolutR; 2/22/22)   Stable on most recent echo.      3) pAF on AAD and DOAC therapies  Having issues related to bruising, but no significant bleeding.    -c/w amiodarone 200 mg daily, eliquis 5 mg bid    4) CAD s/p PCI (LAD ; 10/2021)  Lipids (10/2024): tChol 130, HDL 54, LDL 37, trigs 197  Denies angina.   -c/w clopidogrel, sans ASA given DOAC use  -c/w atorvastatin 40 mg daily, BB      F/U: 6 months at /[place] [address]       ____________________________________________________________  Calvin Calderon DO  Section of Advanced Heart Failure and Cardiac Transplantation  Division of Cardiovascular Medicine  [facility] [place]  [facility]

## 2025-03-24 NOTE — PATIENT INSTRUCTIONS
It was a pleasure seeing you today. Please contact myself or my team with any questions.     To reach Dr. Calderon' office please call [phone] ([place]).   Fax: [phone]   To schedule an appointment call [phone]     If you have any questions or need cardiac medication refills, please call the Heart Failure office at [phone], option 6. You may also contact the  Heart Failure Nursing team via email at [email or web address] (Please include your name and date of birth).        1) Continue your current medications  2) Schedule an appointment with the device clinic; to schedule, call [phone]  3) Follow up in 6 months at /[facility]

## 2025-03-26 PROBLEM — I27.23: Status: RESOLVED | Noted: 2023-09-29 | Resolved: 2025-03-26

## 2025-03-26 ASSESSMENT — ENCOUNTER SYMPTOMS
UNEXPECTED WEIGHT CHANGE: 0
CHILLS: 0
ABDOMINAL PAIN: 0
LIGHT-HEADEDNESS: 0
DIZZINESS: 0
RHINORRHEA: 0
FEVER: 0
EYE PAIN: 0
HEMATURIA: 0
SHORTNESS OF BREATH: 0

## 2025-03-26 NOTE — PROGRESS NOTES
Subjective   Reason for Visit: Greg Comer is an 84 y.o. male here for home O2 evaluation and address other chronic issues.    Past Medical, Surgical, and Family History reviewed and updated in chart.    Reviewed all medications by prescribing practitioner or clinical pharmacist (such as prescriptions, OTCs, herbal therapies and supplements) and documented in the medical record.    HPI  PMHx sig for CAD s/p PCI (LAD ; 10/2021), LFLG severe AS s/p BAV (10/2021) followed by TAVR (#34 EvolutR; 2/22/22), stage C systolic HF/mixed NICM-ICM/HFrEF with moderate to severe LV dysfunction s/p ICD, pAF on AAD and DOAC therapies,     INITIAL VISIT 2/13/24  Timothy has hx of CAD sp PC to LAD (10/21), severe aortic stenosis sp AVR followed by TAVR, HFrEF sp ICD placement, paroxysmal Afib, DM, HTN , HLD and BPH.  Last echo 2/6/23 demonstrated EF 30-35% with global hypokinesis.  Currently asymptomatic.  Hx of BPH sp TURP.  Recent cysto showed patent channel.  Urinary symptoms controlled.  Recently started dupixent for exzema and itch is much better.  Has history of normocytic anemia that corrected on labs 6/15/23.  A1C 6.0 on 9/27/23.  Has spinal cord stimulator - for back and leg pain, no longer necessary   Walking, weights daily  Good energy and good strength  UTD with eye exam   Colonoscopy has always been normal         INTERVAL HISTORY 4/17/24  Wants to work on exertional shortness of breath.  Has HFrEF, ICD, atrial fibrillation and sp TAVR.  Last echocardiogram 2/6/23 EF 30-35% with global hypokinesis.  No change since last visit, but gets OOB easily and wants to be more active.  Not having chest pain, palpitations or dizziness. No abdominal or ankle edema.  No trouble breathing in bed at night.   Recent workup for gait instability and peripheral neuropathy.   MRI completed 4/16/24 - no stroke, hemorrhage or mass.  Nonspecific volume loss and white matter changes.  Not suggestive of NPH.   Has lumbar DDD treated with  spinal cord stimulator.  No longer needs the sitmulator, pain controlled.  Now having numbness in left leg and NCV ordered by neurology.   Wants to work with PT on numbness in left ankle   Dupixent working well - cleared up mike   Has taken sildenafil and would like a refill   Had some blood per rectum but that has cleared up - colonoscopy scheduled.      INTERVAL HISTORY 6/12/24  Recent workup for anemia showed Iron 41 and Ferritin 20.  Hemoccult was positive and EGD and colonoscopy ordered.  Timothy is on the fence about having the testing done.  Recently experiencing increased shortness of breath.  Echocardiogram was completed and demonstrated ejection fraction 40 to 45%.  1 year ago his ejection fraction was 30 to 35%.  There were no new findings.  He has ongoing workup for pain and numbness in his left ankle.  EMG/nerve conduction studies are scheduled this month.  His symptoms are unchanged.  He tries to stay active and reports his breathing is improving overall.  He uses a rollator or cane for ambulation.  No change in urinary symptoms.        INTERVAL HISTORY 9/19/24  Lifting weights and walking daily - feeling well.  No chest pain and breathing at baseline.  No leg or ankle swelling.   Runny eyes and runny nose continue.  Met with ENT - scope/laryngoscopy normal.  On allegra and flonase and astelin added.   EMG suggestive of L5-S1 neuropathy with possibly superimposed polyeuropathy  MRI LS spine ordered   Walking with rollator and cane - mobility has improved over time.   Having intermittent nausea that responds to elin selzer.  Timothy has iron def anemia with heme pos stool.  After consulting with his family he has decided to move forward with EGD and colonoscopy.     INTERVAL HISTORY 3/27/2025:  Patient presents for follow-up. Patient denies chest pain/pressure/tightness, shortness of breath, orthopnea, paroxysmal nocturnal dyspnea, lower limb edema, blurred vision, lightheadedness/dizziness, presyncope,  "syncope.      Patient Care Team:  Tima Matta MD as PCP - General (Family Medicine)     Review of Systems   Constitutional:  Negative for chills, fever and unexpected weight change.   HENT:  Negative for rhinorrhea.    Eyes:  Negative for pain.   Respiratory:  Negative for shortness of breath.    Cardiovascular:  Negative for chest pain.   Gastrointestinal:  Negative for abdominal pain.   Genitourinary:  Negative for hematuria.   Skin:  Negative for rash.   Neurological:  Negative for dizziness, syncope and light-headedness.   Psychiatric/Behavioral:  Negative for behavioral problems and suicidal ideas.        Objective   Vitals:  /68 (BP Location: Left arm, Patient Position: Sitting, BP Cuff Size: Adult)   Pulse 67   Temp 36.5 °C (97.7 °F) (Temporal)   Ht 1.854 m (6' 1\")   Wt 81.4 kg (179 lb 8 oz)   SpO2 99%   BMI 23.68 kg/m²       Physical Exam  Vitals reviewed.   Constitutional:       General: He is not in acute distress.  HENT:      Head: Normocephalic.      Right Ear: External ear normal.      Left Ear: External ear normal.      Nose: No rhinorrhea.      Mouth/Throat:      Mouth: Mucous membranes are moist.   Eyes:      Conjunctiva/sclera: Conjunctivae normal.   Cardiovascular:      Rate and Rhythm: Normal rate and regular rhythm.      Heart sounds: No murmur heard.     No friction rub. No gallop.   Pulmonary:      Effort: No respiratory distress.      Breath sounds: No wheezing, rhonchi or rales.   Abdominal:      General: Bowel sounds are normal. There is no distension.      Palpations: Abdomen is soft.      Tenderness: There is no abdominal tenderness.   Musculoskeletal:      Cervical back: Neck supple.      Right lower leg: No edema.      Left lower leg: No edema.   Skin:     General: Skin is warm and dry.      Capillary Refill: Capillary refill takes less than 2 seconds.   Neurological:      Mental Status: He is alert.      Gait: Gait normal.         Problem List Items Addressed This " Visit          Medium    A-fib (Multi) - Primary    Current Assessment & Plan     PMHx sig for CAD s/p PCI (LAD ; 10/2021), LFLG severe AS s/p BAV (10/2021) followed by TAVR (#34 EvolutR; 2/22/22), stage C systolic HF/mixed NICM-ICM/HFrEF with moderate to severe LV dysfunction s/p ICD, pAF on AAD and DOAC therapies.  -Continue Amiodarone.  -Continue Coreg 6.25mg BID.   -Continue Eliquis.          BPH (benign prostatic hyperplasia)    Current Assessment & Plan     Symptoms improved after TURP.  Recent cysto unremarkable.  LUTS managed by urology         CAD in native artery    Current Assessment & Plan     Currently asymptomatic.  Echocardiogram on 6/5/2024 demonstrated improved ejection fraction to 40 to 45%.  To continue with aspirin, statin and guideline directed medical therapy for decreased ejection fraction         H/O acute myocardial infarction    S/P coronary artery stent placement    S/P TAVR (transcatheter aortic valve replacement)    Controlled type 2 diabetes mellitus with hyperglycemia, without long-term current use of insulin    Current Assessment & Plan     -A1C is below goal.  -Continue Jardiance 10mg daily due to cardiac indications.   -Continue Lipitor 40mg daily.   -Return to clinic in 3 months for point-of-care A1c.           Relevant Orders    Albumin-Creatinine Ratio, Urine Random    Hemoglobin A1C    Dyslipidemia    Current Assessment & Plan     -Stable.  Continue atorvastatin 40 mg daily.         ACC/AHA stage C systolic heart failure    Current Assessment & Plan     -Continue physical therapy.  Patient did not qualify for cardiac rehab.  -Will continue to monitor for GDMT compliance.  -Continue Jardiance 10 mg daily.  -Continue Entresto 49-51 mg twice daily.  -Continue Aldactone 25 mg daily.  -Notable comorbidities: s/p TAVR, s/p cardiac defibrillator/, s/p stent placement.         On anticoagulant therapy    Current Assessment & Plan     -Eliquis is indicated due to atrial fibrillation.    -Will continue to monitor for fall risk.  Benefits of therapy outweigh the risks.              Return to clinic in Sept 2025 months for point-of-care A1c. MAWV 12/3/25 (MAWV-No A1C)    Medical decision making during this visit had more complexity inherent to evaluation and management associated with medical care services that serve as the continuing focal point for all needed health care services and/or with medical care services that are part of ongoing care related to a patient's single, serious condition or a complex condition.     I spent 40 minutes in duration for this visit today. This time consisted of chart review, obtaining history, and/or performing the exam as documented above as well as documenting the clinical information for the encounter in the electronic record, discussing treatment options, plans, and/or goals with patient, family, and/or caregiver, refilling medications, updating the electronic record, ordering medicines, lab work, imaging, referrals, and/or procedures as documented above and communicating with other [facility].

## 2025-03-27 ENCOUNTER — APPOINTMENT (OUTPATIENT)
Dept: PRIMARY CARE | Facility: CLINIC | Age: 85
End: 2025-03-27
Payer: MEDICARE

## 2025-03-27 VITALS
DIASTOLIC BLOOD PRESSURE: 68 MMHG | OXYGEN SATURATION: 99 % | SYSTOLIC BLOOD PRESSURE: 128 MMHG | BODY MASS INDEX: 23.79 KG/M2 | HEART RATE: 67 BPM | HEIGHT: 73 IN | WEIGHT: 179.5 LBS | TEMPERATURE: 97.7 F

## 2025-03-27 DIAGNOSIS — Z95.5 S/P CORONARY ARTERY STENT PLACEMENT: ICD-10-CM

## 2025-03-27 DIAGNOSIS — I25.10 CAD IN NATIVE ARTERY: ICD-10-CM

## 2025-03-27 DIAGNOSIS — Z95.2 S/P TAVR (TRANSCATHETER AORTIC VALVE REPLACEMENT): ICD-10-CM

## 2025-03-27 DIAGNOSIS — I50.20 ACC/AHA STAGE C SYSTOLIC HEART FAILURE: ICD-10-CM

## 2025-03-27 DIAGNOSIS — E78.5 DYSLIPIDEMIA: ICD-10-CM

## 2025-03-27 DIAGNOSIS — N40.1 BENIGN PROSTATIC HYPERPLASIA WITH URINARY HESITANCY: ICD-10-CM

## 2025-03-27 DIAGNOSIS — Z79.01 ON ANTICOAGULANT THERAPY: ICD-10-CM

## 2025-03-27 DIAGNOSIS — R39.11 BENIGN PROSTATIC HYPERPLASIA WITH URINARY HESITANCY: ICD-10-CM

## 2025-03-27 DIAGNOSIS — I25.2 H/O ACUTE MYOCARDIAL INFARCTION: ICD-10-CM

## 2025-03-27 DIAGNOSIS — E11.65 CONTROLLED TYPE 2 DIABETES MELLITUS WITH HYPERGLYCEMIA, WITHOUT LONG-TERM CURRENT USE OF INSULIN: ICD-10-CM

## 2025-03-27 DIAGNOSIS — I48.20 CHRONIC ATRIAL FIBRILLATION (MULTI): Primary | ICD-10-CM

## 2025-03-27 PROCEDURE — 1159F MED LIST DOCD IN RCRD: CPT | Performed by: STUDENT IN AN ORGANIZED HEALTH CARE EDUCATION/TRAINING PROGRAM

## 2025-03-27 PROCEDURE — 1160F RVW MEDS BY RX/DR IN RCRD: CPT | Performed by: STUDENT IN AN ORGANIZED HEALTH CARE EDUCATION/TRAINING PROGRAM

## 2025-03-27 PROCEDURE — G2211 COMPLEX E/M VISIT ADD ON: HCPCS | Performed by: STUDENT IN AN ORGANIZED HEALTH CARE EDUCATION/TRAINING PROGRAM

## 2025-03-27 PROCEDURE — 1036F TOBACCO NON-USER: CPT | Performed by: STUDENT IN AN ORGANIZED HEALTH CARE EDUCATION/TRAINING PROGRAM

## 2025-03-27 PROCEDURE — 99214 OFFICE O/P EST MOD 30 MIN: CPT | Performed by: STUDENT IN AN ORGANIZED HEALTH CARE EDUCATION/TRAINING PROGRAM

## 2025-03-27 PROCEDURE — 1123F ACP DISCUSS/DSCN MKR DOCD: CPT | Performed by: STUDENT IN AN ORGANIZED HEALTH CARE EDUCATION/TRAINING PROGRAM

## 2025-03-27 PROCEDURE — 3074F SYST BP LT 130 MM HG: CPT | Performed by: STUDENT IN AN ORGANIZED HEALTH CARE EDUCATION/TRAINING PROGRAM

## 2025-03-27 PROCEDURE — 3078F DIAST BP <80 MM HG: CPT | Performed by: STUDENT IN AN ORGANIZED HEALTH CARE EDUCATION/TRAINING PROGRAM

## 2025-03-27 ASSESSMENT — ENCOUNTER SYMPTOMS
LOSS OF SENSATION IN FEET: 0
DEPRESSION: 0
OCCASIONAL FEELINGS OF UNSTEADINESS: 1

## 2025-03-29 NOTE — PROGRESS NOTES
Subjective   Greg Comer is a 84 y.o. male with history of BPH with LUTS on Myrbetriq 50mg and erectile dysfunction s/p cystoscopy on 1/31/24 which demonstrated no residual adenoma. He presents today for a follow up visit.     Patient reports he saw Dr. Dc in 10/2024 for penile implant consultation. At that visit, his PVR was over 1,000 cc. He was told to discontinue Myrbetriq, however, patient is unsure if he discontinued the medication.     Denies any recent gross hematuria, fevers, chills, urinary retention, UTI's, nausea or vomiting over the past year.       Past Medical History:   Diagnosis Date    Aortic stenosis     valve replacement followed by TAVR    Atrial fibrillation (Multi)     amoidarone and DOAC    BPH (benign prostatic hyperplasia)     CAD (coronary artery disease)     SAMM to LAD    Congestive heart failure (CHF)     Stage C    Eczema     recently started dupixent    History of cardiac defibrillator placement     Hyperlipemia     Insulin resistance     Personal history of other diseases of the circulatory system 02/16/2022    History of aortic valve stenosis    Recurrent UTI     Right bundle branch block      Past Surgical History:   Procedure Laterality Date    AORTIC VALVE REPLACEMENT      bovine AVR then TAVR    CT HEAD ANGIO W AND WO IV CONTRAST  10/20/2021    CT HEAD ANGIO W AND WO IV CONTRAST 10/20/2021 [facility] CLINICAL LEGACY    SKIN CANCER EXCISION      SPINAL CORD STIMULATOR IMPLANT      TRANSURETHRAL RESECTION OF PROSTATE       Family History   Problem Relation Name Age of Onset    Coronary artery disease Mother      Coronary artery disease Father      No Known Problems Sister      No Known Problems Sister      No Known Problems Brother          Retired OB/Gyn - still teaches at [employer]    No Known Problems Daughter      No Known Problems Daughter          lives in group home - working every day     Current Outpatient Medications   Medication Sig Dispense Refill    amiodarone  (Pacerone) 200 mg tablet TAKE 1 TABLET BY MOUTH DAILY 90 tablet 1    atorvastatin (Lipitor) 40 mg tablet TAKE 1 TABLET BY MOUTH EVERY DAY 90 tablet 3    azelastine (Astelin) 137 mcg (0.1 %) nasal spray USE 2 SPRAYS INTO EACH NOSTRIL TWICE A DAY 30 mL 1    carvedilol (Coreg) 6.25 mg tablet Take 1 tablet (6.25 mg) by mouth 2 times daily (morning and late afternoon). 60 tablet 11    cetirizine (ZyrTEC) 10 mg tablet Take 1 tablet (10 mg) by mouth once daily.      clopidogrel (Plavix) 75 mg tablet TAKE 1 TABLET BY MOUTH ONCE DAILY. 90 tablet 1    crisaborole (Eucrisa) 2 % ointment Apply 1 Application topically 2 times a day.      dupilumab (Dupixent) 300 mg/2 mL injection Inject 2 mL (300 mg) under the skin 1 time.      Eliquis 5 mg tablet TAKE 1 TABLET BY MOUTH EVERY 12 HOURS 60 tablet 11    empagliflozin (Jardiance) 10 mg Take 1 tablet (10 mg) by mouth once daily. 90 tablet 3    Entresto 49-51 mg tablet TAKE 1 TABLET BY MOUTH TWICE A DAY 60 tablet 11    ferrous sulfate, 325 mg ferrous sulfate, (iron) tablet Take 1 tablet (325 mg) by mouth once daily with breakfast.      fexofenadine (Allegra) 180 mg tablet TAKE 1 TABLET (180 MG) BY MOUTH ONCE DAILY AS NEEDED (ALLERGY SYMPTOMS). 90 tablet 0    fish oil concentrate (Omega-3) 120-180 mg capsule Take 1 capsule (1 g) by mouth once daily.      fluocinonide (Lidex) 0.05 % ointment Apply twice daily to affected areas of body (avoid face, groin, body folds) for 2 weeks, taper to weekends only for persistent areas; repeat every 6-8 weeks as needed for flares 60 g 1    furosemide (Lasix) 20 mg tablet Take 1 tablet (20 mg) by mouth once daily as needed.      ipratropium (Atrovent) 21 mcg (0.03 %) nasal spray ADMINISTER 2 SPRAYS INTO EACH NOSTRIL EVERY 12 HOURS. 90 mL 2    mv-min/FA/vit K/lutein/zeaxant (PRESERVISION AREDS 2 PLUS MV ORAL) Take by mouth.      nitroglycerin (Nitrostat) 0.4 mg SL tablet Place 1 tablet (0.4 mg) under the tongue every 5 minutes if needed. UP TO 3 DOSES  AS NEEDED FOR CHEST PAIN.      ondansetron (Zofran) 4 mg tablet TAKE 1 TABLET BY MOUTH EVERY DAY AS NEEDED 15 tablet 0    spironolactone (Aldactone) 25 mg tablet TAKE 1 TABLET DAILY 90 tablet 2    traZODone (Desyrel) 100 mg tablet Take 1 tablet (100 mg) by mouth once daily at bedtime. 90 tablet 1    triamcinolone (Kenalog) 0.1 % cream Apply 1 Application topically.      triamcinolone (Kenalog) 0.1 % cream Apply twice daily to affected areas of body (avoid face, groin, body folds) for 2 weeks, then taper to twice daily on weekends only; repeat every 6-8 weeks as needed for flares 454 g 1    vit C/E/Zn/coppr/lutein/zeaxan (PRESERVISION AREDS-2 ORAL) Take 2 capsules by mouth once daily.       No current facility-administered medications for this visit.     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[employer]   Tobacco Use    Smoking status: Never     Passive exposure: Never    Smokeless tobacco: Never   Vaping Use    Vaping status: Never Used   Substance and Sexual Activity    Alcohol use: Not Currently     Alcohol/week: 7.0 standard drinks of alcohol     Types: 7 Standard drinks or equivalent per week     Comment: one drink per day - only one    Drug use: Not Currently    Sexual activity: Not on file   Other Topics Concern    Not on file   Social History Narrative    Avid skier, walking and lifting weights every morning      Social Drivers of Health     Financial Resource Strain: Low Risk  (8/24/2020)    Received from [facility], [facility]    Overall Financial Resource Strain (CARDIA)     Difficulty of Paying Living Expenses: Not hard at all   Food  Insecurity: No Food Insecurity (8/24/2020)    Received from [facility]    Hunger Vital Sign     Worried About Running Out of Food in the Last Year: Never true     Ran Out of Food in the Last Year: Never true   Transportation Needs: No Transportation Needs (8/24/2020)    Received from [facility]    PRAPARE - Transportation     Lack of Transportation (Medical): No     Lack of Transportation (Non-Medical): No   Physical Activity: Sufficiently Active (8/22/2020)    Received from [facility]    Exercise Vital Sign     Days of Exercise per Week: 7 days     Minutes of Exercise per Session: 50 min   Stress: No Stress Concern Present (8/22/2020)    Received from [facility]    [place] [employer] - Occupational Stress Questionnaire     Feeling of Stress : Not at all   Social Connections: Socially Integrated (8/22/2020)    Received from [facility]    Social Connection and Isolation Panel [NHANES]     Frequency of Communication with Friends and Family: More than three times a week     Frequency of Social Gatherings with Friends and Family: Once a week     Attends Anglican Services: More than 4 times per year     Active Member of Clubs or Organizations: Yes     Attends Club or Organization Meetings: More than 4 times per year     Marital Status:    Intimate Partner Violence: Not on file   Housing Stability: Low Risk  (8/22/2020)    Received from [facility]    Housing Stability Vital Sign     Unable to Pay for Housing in the Last Year: No     Number of Places Lived in the Last Year: 1     Unstable Housing in the Last Year: No       Review of Systems  Pertinent items are noted in HPI.    Objective       Lab Review  Lab Results   Component Value Date    WBC 11.4 (H) 10/08/2024    RBC 5.22 10/08/2024    HGB 13.5 10/08/2024    HCT 44.1 10/31/2024      10/08/2024      Lab Results   Component Value Date    BUN 21 03/20/2025    CREATININE 1.03 03/20/2025       cc, patient did not void before    Assessment/Plan   There are no diagnoses linked to this encounter.    BPH with LUTS s/p PVP on Myrbetriq 50mg.    Patient is unsure if he discontinued his Myrbetriq, we will send a reminder in the patient visit section so the patient can check. He will continue to follow-up on an annual visit for PVR scan.     Erectile Dysfunction     Patient will be getting a penile implant with Dr. Dc.     All questions were answered to the patient's satisfaction. Patient agrees with the plan and wishes to proceed. Follow-up will be scheduled appropriately.     E&M visit today is associated with current or anticipated ongoing medical care services related to a patient’s single, serious condition or a complex condition.     I spent 30 minutes of dedicated E&M time, including preparation and review of records, notes, and data, time spent with patient/family, and documentation.     Scribed for Dr. Cain by Nenita Carrillo. I , Dr Cain, have personally reviewed and agreed with the information entered by the Virtual Scribe.

## 2025-03-31 ENCOUNTER — APPOINTMENT (OUTPATIENT)
Dept: UROLOGY | Facility: CLINIC | Age: 85
End: 2025-03-31
Payer: MEDICARE

## 2025-03-31 VITALS — BODY MASS INDEX: 23.68 KG/M2 | HEIGHT: 73 IN | TEMPERATURE: 97.4 F

## 2025-03-31 DIAGNOSIS — N40.1 BENIGN PROSTATIC HYPERPLASIA WITH URINARY OBSTRUCTION: Primary | ICD-10-CM

## 2025-03-31 DIAGNOSIS — N52.9 ERECTILE DYSFUNCTION, UNSPECIFIED ERECTILE DYSFUNCTION TYPE: ICD-10-CM

## 2025-03-31 DIAGNOSIS — N13.8 BENIGN PROSTATIC HYPERPLASIA WITH URINARY OBSTRUCTION: Primary | ICD-10-CM

## 2025-03-31 PROCEDURE — G2211 COMPLEX E/M VISIT ADD ON: HCPCS | Performed by: UROLOGY

## 2025-03-31 PROCEDURE — 1126F AMNT PAIN NOTED NONE PRSNT: CPT | Performed by: UROLOGY

## 2025-03-31 PROCEDURE — 99213 OFFICE O/P EST LOW 20 MIN: CPT | Performed by: UROLOGY

## 2025-03-31 PROCEDURE — 1123F ACP DISCUSS/DSCN MKR DOCD: CPT | Performed by: UROLOGY

## 2025-03-31 PROCEDURE — 1036F TOBACCO NON-USER: CPT | Performed by: UROLOGY

## 2025-03-31 PROCEDURE — 51798 US URINE CAPACITY MEASURE: CPT | Performed by: UROLOGY

## 2025-03-31 PROCEDURE — 1159F MED LIST DOCD IN RCRD: CPT | Performed by: UROLOGY

## 2025-03-31 ASSESSMENT — PAIN SCALES - GENERAL: PAINLEVEL_OUTOF10: 0-NO PAIN

## 2025-04-01 ENCOUNTER — APPOINTMENT (OUTPATIENT)
Dept: DERMATOLOGY | Facility: CLINIC | Age: 85
End: 2025-04-01
Payer: MEDICARE

## 2025-04-04 ENCOUNTER — APPOINTMENT (OUTPATIENT)
Dept: DERMATOLOGY | Facility: CLINIC | Age: 85
End: 2025-04-04
Payer: MEDICARE

## 2025-04-04 DIAGNOSIS — L57.8 DIFFUSE PHOTODAMAGE OF SKIN: ICD-10-CM

## 2025-04-04 DIAGNOSIS — L71.9 ROSACEA: ICD-10-CM

## 2025-04-04 DIAGNOSIS — L57.0 ACTINIC KERATOSIS: ICD-10-CM

## 2025-04-04 DIAGNOSIS — D48.5 NEOPLASM OF UNCERTAIN BEHAVIOR OF SKIN: Primary | ICD-10-CM

## 2025-04-04 DIAGNOSIS — L82.1 SEBORRHEIC KERATOSIS: ICD-10-CM

## 2025-04-04 DIAGNOSIS — Z87.2 HISTORY OF ACTINIC KERATOSES: ICD-10-CM

## 2025-04-04 RX ORDER — METRONIDAZOLE 7.5 MG/G
CREAM TOPICAL
Qty: 45 G | Refills: 11 | Status: SHIPPED | OUTPATIENT
Start: 2025-04-04

## 2025-04-04 ASSESSMENT — DERMATOLOGY QUALITY OF LIFE (QOL) ASSESSMENT
ARE THERE EXCLUSIONS OR EXCEPTIONS FOR THE QUALITY OF LIFE ASSESSMENT: NO
WHAT SINGLE SKIN CONDITION LISTED BELOW IS THE PATIENT ANSWERING THE QUALITY-OF-LIFE ASSESSMENT QUESTIONS ABOUT: NONE OF THE ABOVE
RATE HOW BOTHERED YOU ARE BY EFFECTS OF YOUR SKIN PROBLEMS ON YOUR ACTIVITIES (EG, GOING OUT, ACCOMPLISHING WHAT YOU WANT, WORK ACTIVITIES OR YOUR RELATIONSHIPS WITH OTHERS): 0 - NEVER BOTHERED
RATE HOW BOTHERED YOU ARE BY SYMPTOMS OF YOUR SKIN PROBLEM (EG, ITCHING, STINGING BURNING, HURTING OR SKIN IRRITATION): 0 - NEVER BOTHERED
DATE THE QUALITY-OF-LIFE ASSESSMENT WAS COMPLETED: 67299
RATE HOW EMOTIONALLY BOTHERED YOU ARE BY YOUR SKIN PROBLEM (FOR EXAMPLE, WORRY, EMBARRASSMENT, FRUSTRATION): 0 - NEVER BOTHERED

## 2025-04-04 ASSESSMENT — DERMATOLOGY PATIENT ASSESSMENT
DO YOU USE SUNSCREEN: OCCASIONALLY
DO YOU USE A TANNING BED: NO
DO YOU HAVE ANY NEW OR CHANGING LESIONS: NO

## 2025-04-04 ASSESSMENT — ITCH NUMERIC RATING SCALE: HOW SEVERE IS YOUR ITCHING?: 0

## 2025-04-04 ASSESSMENT — PATIENT GLOBAL ASSESSMENT (PGA): PATIENT GLOBAL ASSESSMENT: PATIENT GLOBAL ASSESSMENT:  1 - CLEAR
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
## 2025-04-10 ENCOUNTER — APPOINTMENT (OUTPATIENT)
Dept: DERMATOLOGY | Facility: CLINIC | Age: 85
End: 2025-04-10
Payer: MEDICARE

## 2025-04-10 DIAGNOSIS — L57.0 ACTINIC KERATOSIS: Primary | ICD-10-CM

## 2025-04-10 DIAGNOSIS — C44.92 SQUAMOUS CELL CARCINOMA OF SKIN: ICD-10-CM

## 2025-04-10 DIAGNOSIS — L57.8 DIFFUSE PHOTODAMAGE OF SKIN: ICD-10-CM

## 2025-04-10 DIAGNOSIS — L82.1 SEBORRHEIC KERATOSIS: ICD-10-CM

## 2025-04-10 DIAGNOSIS — L73.9 FOLLICULITIS: ICD-10-CM

## 2025-04-10 DIAGNOSIS — L30.9 DERMATITIS: ICD-10-CM

## 2025-04-10 DIAGNOSIS — D22.5 MELANOCYTIC NEVUS OF TRUNK: ICD-10-CM

## 2025-04-10 PROCEDURE — 1123F ACP DISCUSS/DSCN MKR DOCD: CPT | Performed by: DERMATOLOGY

## 2025-04-10 PROCEDURE — 17000 DESTRUCT PREMALG LESION: CPT | Performed by: DERMATOLOGY

## 2025-04-10 PROCEDURE — 99214 OFFICE O/P EST MOD 30 MIN: CPT | Performed by: DERMATOLOGY

## 2025-04-10 RX ORDER — FLUOCINONIDE 0.5 MG/G
CREAM TOPICAL
Qty: 120 G | Refills: 2 | Status: SHIPPED | OUTPATIENT
Start: 2025-04-10

## 2025-04-10 RX ORDER — CLINDAMYCIN PHOSPHATE 10 UG/ML
LOTION TOPICAL 2 TIMES DAILY
Qty: 60 ML | Refills: 11 | Status: SHIPPED | OUTPATIENT
Start: 2025-04-10 | End: 2026-04-10

## 2025-04-11 NOTE — PROGRESS NOTES
"Shelley Comer \"Timothy\" is a 84 y.o. male who presents for the following: Discuss recent biopsy results and management options.  Biopsy of 2 suspicious lesions performed at his last visit in our office on 4/4/25 revealed a hypertrophic actinic keratosis on his right lateral upper cheek and an atypical squamous proliferation, for which the surface of an invasive SCC was favored, on his right distal dorsal hand.    Today, the patient states the biopsy sites healed well.  Since his last visit, he reports he has developed a flare of red, scaly, itchy areas on his abdomen.  He also notes recent pimple breakouts on his neck.  He denies any new, changing, or concerning skin lesions since his last visit; no bleeding, itching, or burning lesions.      Review of Systems:  No other skin or systemic complaints other than what is documented elsewhere in the note.    The following portions of the chart were reviewed this encounter and updated as appropriate:       Skin Cancer History  No skin cancer on file.    Specialty Problems    None      Past Dermatologic / Past Relevant Medical History:    - recent biopsy-proven atypical squamous proliferation, for an invasive SCC was favored, on right distal dorsal hand diagnosed at last visit on 4/4/25 and pending definitive treatment  - history of AKs  - history of eczema, currently on Dupixent prescribed by his outside Dermatologist, Dr. Nenita Alvarez, according to the patient  - no history of psoriasis or melanoma    Family History:    No family history of eczema, psoriasis, or skin cancer    Social History:    The patient states he works as an  and was seen with his wife in the office in the past, who he reports was diagnosed with an arthropod assault and resolved after they hired an  and found bed bugs    Allergies:  Coenzyme q10    Current Medications / CAM's:    Current Outpatient Medications:     amiodarone (Pacerone) 200 mg tablet, TAKE 1 " TABLET BY MOUTH DAILY, Disp: 90 tablet, Rfl: 1    atorvastatin (Lipitor) 40 mg tablet, TAKE 1 TABLET BY MOUTH EVERY DAY, Disp: 90 tablet, Rfl: 3    azelastine (Astelin) 137 mcg (0.1 %) nasal spray, USE 2 SPRAYS INTO EACH NOSTRIL TWICE A DAY, Disp: 30 mL, Rfl: 1    carvedilol (Coreg) 6.25 mg tablet, Take 1 tablet (6.25 mg) by mouth 2 times daily (morning and late afternoon)., Disp: 60 tablet, Rfl: 11    cetirizine (ZyrTEC) 10 mg tablet, Take 1 tablet (10 mg) by mouth once daily., Disp: , Rfl:     clindamycin (Cleocin T) 1 % lotion, Apply topically 2 times a day., Disp: 60 mL, Rfl: 11    clopidogrel (Plavix) 75 mg tablet, TAKE 1 TABLET BY MOUTH ONCE DAILY., Disp: 90 tablet, Rfl: 1    crisaborole (Eucrisa) 2 % ointment, Apply 1 Application topically 2 times a day., Disp: , Rfl:     dupilumab (Dupixent) 300 mg/2 mL injection, Inject 2 mL (300 mg) under the skin 1 time., Disp: , Rfl:     Eliquis 5 mg tablet, TAKE 1 TABLET BY MOUTH EVERY 12 HOURS, Disp: 60 tablet, Rfl: 11    empagliflozin (Jardiance) 10 mg, Take 1 tablet (10 mg) by mouth once daily., Disp: 90 tablet, Rfl: 3    Entresto 49-51 mg tablet, TAKE 1 TABLET BY MOUTH TWICE A DAY, Disp: 60 tablet, Rfl: 11    ferrous sulfate, 325 mg ferrous sulfate, (iron) tablet, Take 1 tablet (325 mg) by mouth once daily with breakfast., Disp: , Rfl:     fexofenadine (Allegra) 180 mg tablet, TAKE 1 TABLET (180 MG) BY MOUTH ONCE DAILY AS NEEDED (ALLERGY SYMPTOMS)., Disp: 90 tablet, Rfl: 0    fish oil concentrate (Omega-3) 120-180 mg capsule, Take 1 capsule (1 g) by mouth once daily., Disp: , Rfl:     fluocinonide (Lidex) 0.05 % cream, Apply twice daily to affected areas of body (avoid face, groin, body folds) for 2 weeks, taper to weekends only for persistent areas; repeat every 6-8 weeks as needed for flares, Disp: 120 g, Rfl: 2    fluocinonide (Lidex) 0.05 % ointment, Apply twice daily to affected areas of body (avoid face, groin, body folds) for 2 weeks, taper to weekends only  for persistent areas; repeat every 6-8 weeks as needed for flares, Disp: 60 g, Rfl: 1    furosemide (Lasix) 20 mg tablet, Take 1 tablet (20 mg) by mouth once daily as needed., Disp: , Rfl:     ipratropium (Atrovent) 21 mcg (0.03 %) nasal spray, ADMINISTER 2 SPRAYS INTO EACH NOSTRIL EVERY 12 HOURS., Disp: 90 mL, Rfl: 2    metroNIDAZOLE (Metrocream) 0.75 % cream, Apply twice daily to affected areas of face, Disp: 45 g, Rfl: 11    mv-min/FA/vit K/lutein/zeaxant (PRESERVISION AREDS 2 PLUS MV ORAL), Take by mouth., Disp: , Rfl:     nitroglycerin (Nitrostat) 0.4 mg SL tablet, Place 1 tablet (0.4 mg) under the tongue every 5 minutes if needed. UP TO 3 DOSES AS NEEDED FOR CHEST PAIN., Disp: , Rfl:     ondansetron (Zofran) 4 mg tablet, TAKE 1 TABLET BY MOUTH EVERY DAY AS NEEDED, Disp: 15 tablet, Rfl: 0    spironolactone (Aldactone) 25 mg tablet, TAKE 1 TABLET DAILY, Disp: 90 tablet, Rfl: 2    traZODone (Desyrel) 100 mg tablet, Take 1 tablet (100 mg) by mouth once daily at bedtime., Disp: 90 tablet, Rfl: 1    triamcinolone (Kenalog) 0.1 % cream, Apply 1 Application topically., Disp: , Rfl:     triamcinolone (Kenalog) 0.1 % cream, Apply twice daily to affected areas of body (avoid face, groin, body folds) for 2 weeks, then taper to twice daily on weekends only; repeat every 6-8 weeks as needed for flares, Disp: 454 g, Rfl: 1    vit C/E/Zn/coppr/lutein/zeaxan (PRESERVISION AREDS-2 ORAL), Take 2 capsules by mouth once daily., Disp: , Rfl:      Objective   Well appearing patient in no apparent distress; mood and affect are within normal limits.    A waist-up examination was performed including scalp, face, eyes, ears, nose, lips, neck, chest, axillae, abdomen, back, and bilateral upper extremities. All findings within normal limits unless otherwise noted below.        Assessment/Plan   1. Actinic keratosis  Head - Anterior (Face)  On the patient's right lateral upper cheek, there is a pink, well-healed scar at the recent biopsy  site with a surrounding rim of erythema, grittiness, and scale    Biopsy-proven Hypertrophic Actinic Keratosis -right lateral upper cheek, extending to the deep (via adnexa) and peripheral margins.  The pre-cancerous nature of this lesion, its presence on the margin(s), and treatment options were discussed with the patient today.  At this time, I recommend treatment with liquid nitrogen cryotherapy.  The patient expressed understanding, is in agreement with this plan, and wishes to proceed with cryotherapy today.    Destr of lesion - Head - Anterior (Face)  Complexity: simple    Destruction method: cryotherapy    Informed consent: discussed and consent obtained    Lesion destroyed using liquid nitrogen: Yes    Region frozen until ice ball extended beyond lesion: Yes    Cryotherapy cycles:  1  Outcome: patient tolerated procedure well with no complications    Post-procedure details: wound care instructions given      2. Squamous cell carcinoma of skin  Right distal dorsal hand  Pink, well-healed scar at his recent biopsy site    Biopsy-proven atypical squamous proliferation, for which an invasive squamous cell carcinoma was favored - right distal dorsal hand.  The findings of an atypical squamous proliferation, for which an invasive SCC was favored, the malignant nature of SCC and the need for further definitive treatment of this lesion, and management options were discussed extensively with the patient in the office today.  At this time, I recommend referral to our Dermatologic surgery unit for definitive treatment of this SCC, likely with Mohs surgery.  The patient expressed understanding and is in agreement with this plan.  Thus, a referral was submitted on his behalf.  He expressed understanding, is in agreement with this plan, and will anticipate a phone call from our surgery unit within the next 1 to 2 weeks to schedule his surgery.    3. Folliculitis  Neck - Anterior  Scattered on the patient's neck, there are  several follicular-based erythematous, inflammatory papules and pustules    Folliculitis -neck.  The bacterial nature of this condition and treatment options were discussed with the patient today.  At this time, I recommend topical antibiotic therapy with Clindamycin 1% lotion, which the patient was instructed to apply twice daily to the affected areas or up to 3-4 times per day as needed for active lesions.  The risks, benefits, and side effects of this medication were discussed.  The patient expressed understanding and is in agreement with this plan.    clindamycin (Cleocin T) 1 % lotion - Neck - Anterior  Apply topically 2 times a day.    4. Dermatitis  Right Abdomen (side) - Upper  On the patient's abdomen, there are multiple erythematous, scaly papules coalescing into several ill-defined, slightly lichenified, thin plaques    Eczema -flare on abdomen.  The potentially chronic and intermittently flaring nature of this condition and treatment options, including topical therapy, UV phototherapy, and systemic therapy, such as with immunosuppressive medications like Methotrexate or CellCept as well as biologic medications, specifically Dupixent, were discussed extensively with the patient today.  After discussing the risks and benefits of each of these options at length, the patient expressed understanding and wishes to continue his current systemic therapy with Dupixent as prescribed by his outside Dermatologist, Dr. Alvarez, and topical therapy.    Thus, at this time, I recommend topical steroid therapy with Fluocinonide 0.05% cream, which the patient was instructed to apply twice daily to the affected areas of his abdomen (avoid face, groin, body folds) for the next 2 weeks, followed by taper to twice daily on weekends only for persistent areas; the patient may repeat treatment in a 2-week burst-and-taper fashion every 6-8 weeks as needed for future flares.  I also emphasized the importance of dry, sensitive skin  care, including the use of a mild soap, such as Dove, and frequent and aggressive moisturization, at least twice daily and immediately following showers or baths, with recommended over-the-counter moisturizing creams, such as Eucerin, Cetaphil, Cerave, or Aveeno, or Vaseline or Aquaphor ointments.  The risks, benefits, and side effects of this medication, including possible skin atrophy with overuse of topical steroids, were discussed.  The patient expressed understanding and is in agreement with this plan.    fluocinonide (Lidex) 0.05 % cream - Right Abdomen (side) - Upper  Apply twice daily to affected areas of body (avoid face, groin, body folds) for 2 weeks, taper to weekends only for persistent areas; repeat every 6-8 weeks as needed for flares    Related Medications  triamcinolone (Kenalog) 0.1 % cream  Apply twice daily to affected areas of body (avoid face, groin, body folds) for 2 weeks, then taper to twice daily on weekends only; repeat every 6-8 weeks as needed for flares    5. Melanocytic nevus of trunk  Scattered on the patient's face, neck, trunk, and bilateral upper extremities, there are several small, round- to oval-shaped, brown-pigmented and pink-colored, symmetric, uniform-appearing macules and dome-shaped papules    Clinically benign- to slightly atypical-appearing nevi - the clinically benign- to slightly atypical-appearing nature of the patient's nevi was discussed with the patient today.  None of the patient's nevi meet threshold for biopsy today.  I emphasized the importance of performing monthly self-skin exams using the ABCDs of monitoring moles, which were reviewed with the patient today and an informational hand-out provided.  I also emphasized the importance of sun avoidance and sun protection with daily sunscreen use.  The patient expressed understanding and is in agreement with this plan.    6. Seborrheic keratosis  Scattered on the patient's face, neck, trunk, and bilateral upper  extremities, there are multiple tan- to light brown-colored, hyperkeratotic, stuck-on appearing papules of varying size and shape    Seborrheic Keratoses - the benign nature of these lesions was discussed with the patient today and reassurance provided.  No treatment is medically indicated for these lesions at this time.    7. Diffuse photodamage of skin  Diffuse photodamage with actinic changes with telangiectasia and mottled pigmentation in sun-exposed areas.    History of squamous cell carcinoma and actinic keratoses and photodamage.  The signs and symptoms of skin cancer were reviewed and the patient was advised to practice sun protection and sun avoidance, use daily sunscreen, and perform regular self skin exams.  I will have the patient return to our office in 4 to 6 months for routine follow-up and skin exam, and the patient was instructed to call our office should the patient notice any new, changing, symptomatic, or otherwise concerning skin lesions before then.  The patient expressed understanding and is in agreement with this plan.

## 2025-04-15 DIAGNOSIS — I48.91 UNSPECIFIED ATRIAL FIBRILLATION (MULTI): ICD-10-CM

## 2025-04-15 DIAGNOSIS — I50.20 ACC/AHA STAGE C SYSTOLIC HEART FAILURE: ICD-10-CM

## 2025-04-15 DIAGNOSIS — J34.89 NASAL AND SINUS DISCHARGE: ICD-10-CM

## 2025-04-15 DIAGNOSIS — J34.2 NASAL SEPTAL DEVIATION: ICD-10-CM

## 2025-04-15 RX ORDER — CARVEDILOL 6.25 MG/1
6.25 TABLET ORAL
Qty: 180 TABLET | Refills: 3 | Status: SHIPPED | OUTPATIENT
Start: 2025-04-15 | End: 2026-04-15

## 2025-04-15 RX ORDER — APIXABAN 5 MG/1
5 TABLET, FILM COATED ORAL EVERY 12 HOURS
Qty: 60 TABLET | Refills: 11 | Status: SHIPPED | OUTPATIENT
Start: 2025-04-15

## 2025-04-16 ENCOUNTER — TELEPHONE (OUTPATIENT)
Dept: DERMATOLOGY | Facility: CLINIC | Age: 85
End: 2025-04-16
Payer: MEDICARE

## 2025-04-16 NOTE — TELEPHONE ENCOUNTER
Received refill request yeaterday for pt Fluocinonide 0.05% cr 120gm tube -- Dr Gottlieb sent rx off on 04/10/25 with 2 add refills .. Left message with pharmacy /

## 2025-04-18 RX ORDER — FEXOFENADINE HCL 180 MG/1
180 TABLET ORAL DAILY PRN
Qty: 90 TABLET | Refills: 0 | Status: SHIPPED | OUTPATIENT
Start: 2025-04-18 | End: 2026-04-18

## 2025-04-21 ENCOUNTER — HOSPITAL ENCOUNTER (OUTPATIENT)
Dept: CARDIOLOGY | Facility: CLINIC | Age: 85
Discharge: HOME | End: 2025-04-21
Payer: MEDICARE

## 2025-04-21 ENCOUNTER — APPOINTMENT (OUTPATIENT)
Dept: PHYSICAL THERAPY | Facility: CLINIC | Age: 85
End: 2025-04-21
Payer: MEDICARE

## 2025-04-21 DIAGNOSIS — Z95.810 CARDIOMYOPATHY WITH IMPLANTABLE CARDIOVERTER-DEFIBRILLATOR: ICD-10-CM

## 2025-04-21 DIAGNOSIS — I42.9 CARDIOMYOPATHY WITH IMPLANTABLE CARDIOVERTER-DEFIBRILLATOR: ICD-10-CM

## 2025-04-21 PROCEDURE — 93296 REM INTERROG EVL PM/IDS: CPT

## 2025-04-21 PROCEDURE — 93295 DEV INTERROG REMOTE 1/2/MLT: CPT | Performed by: INTERNAL MEDICINE

## 2025-04-29 NOTE — PROGRESS NOTES
"HPI:  84 y.o. yo male patient complains of  erectile dysfunction, would like to discuss penile implants , failed pills    Last Visit: 10/29/24  -ED panel  -pt intererested in inflatable penile prosthesis. We discussed his unstable gait and weakness and numbness,   Issues and whether they would hinder his use of implants.  #urinary retention  -will stop Myrbetriq if he is on it (discussed with Dr Cain)  -will check renal bladder US and creatinine   - will follow up with Dr Cain with results - will need to ensure urinary issues resolved and no UTI prior to considering implant  -PVR 1192 today in clinic   #hx of aortic valve replacement  - will talk to cardio about being cleared for sex  -will need cardiology clearance prior to IPP placement   #scrotal redness  - will trial Lotrisone     Fu in 1 month with ED labs     Today's Visit  Primary care note and dermatology note reviewed    #ED  On nitrates  Viagra/sildenafil - response: poor  Cialis/tadalafil- response: poor  Tried penile injections: no  Libido/Desire: Good  Have been on Testosterone /anabolic steroids? No  Pain or curvature in penis when erect: used to have a little curve  Premature or delayed ejaculation:  None    Denies diabetes  - no T before     #hx of aortic valve replacement  -patient is on Plavix    #urinary retention  -followed by Ant Cain's notes reviewed     -Ucx positive on 10/18/2024    Renal US, 11/12/24,personally reviewed/interpreted:  IMPRESSION:  Bilateral renal cysts.  Trabeculated urinary bladder wall.  Lab Results   Component Value Date    TESTOSTERONE 381 10/31/2024     Lab Results   Component Value Date    LH 7.5 10/31/2024     Lab Results   Component Value Date    FSH 24.0 10/31/2024     No results found for: \"PSA\"  Lab Results   Component Value Date    PROLACTIN 7.0 10/31/2024     Lab Results   Component Value Date    CREATININE 1.03 03/20/2025     Lab Results   Component Value Date    CHOL 130 10/31/2024     Lab Results "   Component Value Date    HDL 54.0 10/31/2024     Lab Results   Component Value Date    CHHDL 2.4 10/31/2024        Lab Results   Component Value Date    LDLCALC 37 10/31/2024     Lab Results   Component Value Date    VLDL 39 10/31/2024     Lab Results   Component Value Date    TRIG 197 (H) 10/31/2024     Lab Results   Component Value Date    HGBA1C 5.5 10/31/2024     Lab Results   Component Value Date    HCT 44.1 10/31/2024           PMH:  Past Medical History:   Diagnosis Date    Aortic stenosis     valve replacement followed by TAVR    Atrial fibrillation (Multi)     amoidarone and DOAC    BPH (benign prostatic hyperplasia)     CAD (coronary artery disease)     SAMM to LAD    Congestive heart failure (CHF)     Stage C    Eczema     recently started dupixent    History of cardiac defibrillator placement     Hyperlipemia     Insulin resistance     Personal history of other diseases of the circulatory system 02/16/2022    History of aortic valve stenosis    Recurrent UTI     Right bundle branch block         PSH:  Past Surgical History:   Procedure Laterality Date    AORTIC VALVE REPLACEMENT      bovine AVR then TAVR    CT HEAD ANGIO W AND WO IV CONTRAST  10/20/2021    CT HEAD ANGIO W AND WO IV CONTRAST 10/20/2021 [facility] CLINICAL LEGAC[facility]    SKIN CANCER EXCISION      SPINAL CORD STIMULATOR IMPLANT      TRANSURETHRAL RESECTION OF PROSTATE          Medications:    Current Outpatient Medications:     amiodarone (Pacerone) 200 mg tablet, TAKE 1 TABLET BY MOUTH DAILY, Disp: 90 tablet, Rfl: 1    atorvastatin (Lipitor) 40 mg tablet, TAKE 1 TABLET BY MOUTH EVERY DAY, Disp: 90 tablet, Rfl: 3    azelastine (Astelin) 137 mcg (0.1 %) nasal spray, USE 2 SPRAYS INTO EACH NOSTRIL TWICE A DAY, Disp: 30 mL, Rfl: 1    carvedilol (Coreg) 6.25 mg tablet, TAKE 1 TABLET (6.25 MG) BY MOUTH 2 TIMES DAILY (MORNING AND LATE AFTERNOON)., Disp: 180 tablet, Rfl: 3    cetirizine (ZyrTEC) 10 mg tablet, Take 1 tablet (10 mg) by mouth once daily., Disp:  , Rfl:     clindamycin (Cleocin T) 1 % lotion, Apply topically 2 times a day., Disp: 60 mL, Rfl: 11    clopidogrel (Plavix) 75 mg tablet, TAKE 1 TABLET BY MOUTH ONCE DAILY., Disp: 90 tablet, Rfl: 1    crisaborole (Eucrisa) 2 % ointment, Apply 1 Application topically 2 times a day., Disp: , Rfl:     dupilumab (Dupixent) 300 mg/2 mL injection, Inject 2 mL (300 mg) under the skin 1 time., Disp: , Rfl:     Eliquis 5 mg tablet, TAKE 1 TABLET BY MOUTH EVERY 12 HOURS, Disp: 60 tablet, Rfl: 11    empagliflozin (Jardiance) 10 mg, Take 1 tablet (10 mg) by mouth once daily., Disp: 90 tablet, Rfl: 3    Entresto 49-51 mg tablet, TAKE 1 TABLET BY MOUTH TWICE A DAY, Disp: 60 tablet, Rfl: 11    ferrous sulfate, 325 mg ferrous sulfate, (iron) tablet, Take 1 tablet (325 mg) by mouth once daily with breakfast., Disp: , Rfl:     fexofenadine (Allegra) 180 mg tablet, TAKE 1 TABLET (180 MG) BY MOUTH ONCE DAILY AS NEEDED (ALLERGY SYMPTOMS)., Disp: 90 tablet, Rfl: 0    fish oil concentrate (Omega-3) 120-180 mg capsule, Take 1 capsule (1 g) by mouth once daily., Disp: , Rfl:     fluocinonide (Lidex) 0.05 % cream, Apply twice daily to affected areas of body (avoid face, groin, body folds) for 2 weeks, taper to weekends only for persistent areas; repeat every 6-8 weeks as needed for flares, Disp: 120 g, Rfl: 2    fluocinonide (Lidex) 0.05 % ointment, Apply twice daily to affected areas of body (avoid face, groin, body folds) for 2 weeks, taper to weekends only for persistent areas; repeat every 6-8 weeks as needed for flares, Disp: 60 g, Rfl: 1    furosemide (Lasix) 20 mg tablet, Take 1 tablet (20 mg) by mouth once daily as needed., Disp: , Rfl:     ipratropium (Atrovent) 21 mcg (0.03 %) nasal spray, ADMINISTER 2 SPRAYS INTO EACH NOSTRIL EVERY 12 HOURS., Disp: 90 mL, Rfl: 2    metroNIDAZOLE (Metrocream) 0.75 % cream, Apply twice daily to affected areas of face, Disp: 45 g, Rfl: 11    mv-min/FA/vit K/lutein/zeaxant (PRESERVISION AREDS 2 PLUS  MV ORAL), Take by mouth., Disp: , Rfl:     nitroglycerin (Nitrostat) 0.4 mg SL tablet, Place 1 tablet (0.4 mg) under the tongue every 5 minutes if needed. UP TO 3 DOSES AS NEEDED FOR CHEST PAIN., Disp: , Rfl:     ondansetron (Zofran) 4 mg tablet, TAKE 1 TABLET BY MOUTH EVERY DAY AS NEEDED, Disp: 15 tablet, Rfl: 0    spironolactone (Aldactone) 25 mg tablet, TAKE 1 TABLET DAILY, Disp: 90 tablet, Rfl: 2    traZODone (Desyrel) 100 mg tablet, Take 1 tablet (100 mg) by mouth once daily at bedtime., Disp: 90 tablet, Rfl: 1    triamcinolone (Kenalog) 0.1 % cream, Apply 1 Application topically., Disp: , Rfl:     triamcinolone (Kenalog) 0.1 % cream, Apply twice daily to affected areas of body (avoid face, groin, body folds) for 2 weeks, then taper to twice daily on weekends only; repeat every 6-8 weeks as needed for flares, Disp: 454 g, Rfl: 1    vit C/E/Zn/coppr/lutein/zeaxan (PRESERVISION AREDS-2 ORAL), Take 2 capsules by mouth once daily., Disp: , Rfl:     Allergy:  Allergies   Allergen Reactions    Coenzyme Q10 Runny nose     Other Reaction(s): Intolerance      Insomnia        Exam  Testicles descended bilaterally, nontender, no masses  Vasa palpable bilaterally  Penis circ'd, no lesions, no plaques  Redness around scrotum and urine leakage on it    Assessment/Plan  #Erectile Dysfunction failed pills: I discussed the etiology and different treatment modalities for erectile dysfunction. Specifically, we talked about lifestyle factors like smoking, diet, and exercise. We also discussed ED as a sign of systemic disease, and the contributions of elevated cholesterol and elevated blood sugars on erections. We then discussed treatment modalities, specifically PDE5 inhibitors, intracavernosal injections, vacuum erectile devices, and penile prostheses.    -pt intererested in inflatable penile prosthesis. We discussed his unstable gait and weakness and numbness, Issues and whether they would hinder his use of implants. He also has  urinary incontinence and scrotal redness which is where we would make the incision for the implant. We discussed that IPP may not be the best option for him given risk of infection from urinary leakage and his overall health status  -discussed malleable implant as well     #urinary incontinence  -stopped Myrbetriq which improved retention but is causing more leakage   -will refer him to Dr Sofia for management of incontinence, could consider sling and them malleable at later date    #hx of aortic valve replacement  - will talk to cardio about being cleared for sex        G 2211  Visit complexity inherent to evaluation and management (E&M) associated with medical care services that serve as the continuing focal point for all needed health care services and/or with medical care services that are part of ongoing care related to a patient's single, serious condition or a complex condition.    Scribe Attestation  By signing my name below, IYanna , Scribalberto   attest that this documentation has been prepared under the direction and in the presence of Donavan Dc MD.

## 2025-04-30 ENCOUNTER — APPOINTMENT (OUTPATIENT)
Dept: UROLOGY | Facility: CLINIC | Age: 85
End: 2025-04-30
Payer: MEDICARE

## 2025-04-30 DIAGNOSIS — R32 URINARY INCONTINENCE, UNSPECIFIED TYPE: ICD-10-CM

## 2025-04-30 DIAGNOSIS — N40.1 BENIGN PROSTATIC HYPERPLASIA WITH LOWER URINARY TRACT SYMPTOMS, SYMPTOM DETAILS UNSPECIFIED: Primary | ICD-10-CM

## 2025-04-30 DIAGNOSIS — N52.9 ERECTILE DYSFUNCTION, UNSPECIFIED ERECTILE DYSFUNCTION TYPE: ICD-10-CM

## 2025-04-30 PROCEDURE — 1159F MED LIST DOCD IN RCRD: CPT | Performed by: UROLOGY

## 2025-04-30 PROCEDURE — 99214 OFFICE O/P EST MOD 30 MIN: CPT | Performed by: UROLOGY

## 2025-04-30 PROCEDURE — 1123F ACP DISCUSS/DSCN MKR DOCD: CPT | Performed by: UROLOGY

## 2025-04-30 PROCEDURE — G2211 COMPLEX E/M VISIT ADD ON: HCPCS | Performed by: UROLOGY

## 2025-04-30 PROCEDURE — 1036F TOBACCO NON-USER: CPT | Performed by: UROLOGY

## 2025-05-01 ENCOUNTER — TELEPHONE (OUTPATIENT)
Dept: PRIMARY CARE | Facility: CLINIC | Age: 85
End: 2025-05-01
Payer: MEDICARE

## 2025-05-01 DIAGNOSIS — R26.81 GAIT INSTABILITY: Primary | ICD-10-CM

## 2025-05-01 NOTE — TELEPHONE ENCOUNTER
I put in the order for physical therapy.    Tima Matta MD, [facility]  Physician  Department of Family Medicine of [facility] - Primary Care

## 2025-05-06 DIAGNOSIS — J34.2 NASAL SEPTAL DEVIATION: ICD-10-CM

## 2025-05-06 DIAGNOSIS — J34.89 NASAL AND SINUS DISCHARGE: ICD-10-CM

## 2025-05-14 RX ORDER — FEXOFENADINE HCL 180 MG/1
180 TABLET ORAL DAILY PRN
Qty: 90 TABLET | Refills: 0 | Status: SHIPPED | OUTPATIENT
Start: 2025-05-14 | End: 2026-05-14

## 2025-05-15 ENCOUNTER — TELEPHONE (OUTPATIENT)
Dept: UROLOGY | Facility: CLINIC | Age: 85
End: 2025-05-15
Payer: MEDICARE

## 2025-05-15 DIAGNOSIS — B37.49 YEAST UTI: ICD-10-CM

## 2025-05-15 DIAGNOSIS — N39.0 RECURRENT UTI: ICD-10-CM

## 2025-05-15 RX ORDER — CIPROFLOXACIN 500 MG/1
500 TABLET ORAL 2 TIMES DAILY
Qty: 14 TABLET | Refills: 0 | Status: SHIPPED | OUTPATIENT
Start: 2025-05-15 | End: 2025-05-22

## 2025-05-15 NOTE — TELEPHONE ENCOUNTER
Patient called the office c/o UTI symptoms. He would like an antibiotic ordered. Returned patient's call and explained he will need to leave a urine culture at the lab. Explained to patient that last two urine cultures required different treatments. Patient cannot get to the lab until tomorrow. With the weekend coming up, patient can be empirically started on antibiotic. He understands he can start it after he leaves his sample. He will be notified of results.

## 2025-05-17 LAB — BACTERIA UR CULT: ABNORMAL

## 2025-05-19 RX ORDER — FLUCONAZOLE 150 MG/1
150 TABLET ORAL DAILY
Qty: 3 TABLET | Refills: 0 | Status: SHIPPED | OUTPATIENT
Start: 2025-05-19 | End: 2025-05-23 | Stop reason: SDUPTHER

## 2025-05-20 ENCOUNTER — APPOINTMENT (OUTPATIENT)
Dept: UROLOGY | Facility: CLINIC | Age: 85
End: 2025-05-20
Payer: MEDICARE

## 2025-05-23 ENCOUNTER — APPOINTMENT (OUTPATIENT)
Dept: DERMATOLOGY | Facility: CLINIC | Age: 85
End: 2025-05-23
Payer: MEDICARE

## 2025-05-23 DIAGNOSIS — B37.49 YEAST UTI: ICD-10-CM

## 2025-05-23 RX ORDER — FLUCONAZOLE 150 MG/1
150 TABLET ORAL DAILY
Qty: 3 TABLET | Refills: 0 | Status: SHIPPED | OUTPATIENT
Start: 2025-05-23 | End: 2025-05-26

## 2025-06-02 ENCOUNTER — TELEPHONE (OUTPATIENT)
Dept: UROLOGY | Facility: CLINIC | Age: 85
End: 2025-06-02

## 2025-06-02 ENCOUNTER — APPOINTMENT (OUTPATIENT)
Dept: UROLOGY | Facility: CLINIC | Age: 85
End: 2025-06-02
Payer: MEDICARE

## 2025-06-02 NOTE — TELEPHONE ENCOUNTER
Pt LVM on nurse line requesting a referral to a hemorrhoids specialist. Pt stated that he is having a hemorrhoids attack and would like to be referred to someone who can help. Please advise.

## 2025-06-03 DIAGNOSIS — N39.0 RECURRENT UTI: Primary | ICD-10-CM

## 2025-06-05 DIAGNOSIS — N39.0 RECURRENT UTI: ICD-10-CM

## 2025-06-05 LAB — BACTERIA UR CULT: NORMAL

## 2025-06-05 RX ORDER — CIPROFLOXACIN 500 MG/1
500 TABLET, FILM COATED ORAL 2 TIMES DAILY
Qty: 14 TABLET | Refills: 0 | Status: SHIPPED | OUTPATIENT
Start: 2025-06-05 | End: 2025-06-12

## 2025-06-08 DIAGNOSIS — I25.2 OLD MYOCARDIAL INFARCTION: ICD-10-CM

## 2025-06-09 DIAGNOSIS — I25.10 ATHEROSCLEROTIC HEART DISEASE OF NATIVE CORONARY ARTERY WITHOUT ANGINA PECTORIS: ICD-10-CM

## 2025-06-09 RX ORDER — ATORVASTATIN CALCIUM 40 MG/1
40 TABLET, FILM COATED ORAL DAILY
Qty: 90 TABLET | Refills: 3 | Status: SHIPPED | OUTPATIENT
Start: 2025-06-09

## 2025-06-09 RX ORDER — CLOPIDOGREL BISULFATE 75 MG/1
75 TABLET ORAL DAILY
Qty: 90 TABLET | Refills: 3 | Status: SHIPPED | OUTPATIENT
Start: 2025-06-09

## 2025-06-13 ENCOUNTER — HOSPITAL ENCOUNTER (OUTPATIENT)
Dept: CARDIOLOGY | Facility: HOSPITAL | Age: 85
Discharge: HOME | End: 2025-06-13
Payer: MEDICARE

## 2025-06-13 DIAGNOSIS — I42.9 CARDIOMYOPATHY WITH IMPLANTABLE CARDIOVERTER-DEFIBRILLATOR: ICD-10-CM

## 2025-06-13 DIAGNOSIS — Z95.810 CARDIOMYOPATHY WITH IMPLANTABLE CARDIOVERTER-DEFIBRILLATOR: ICD-10-CM

## 2025-06-13 DIAGNOSIS — I42.9 CARDIOMYOPATHY WITH IMPLANTABLE CARDIOVERTER-DEFIBRILLATOR: Primary | ICD-10-CM

## 2025-06-13 DIAGNOSIS — I48.0 PAROXYSMAL ATRIAL FIBRILLATION (MULTI): ICD-10-CM

## 2025-06-13 DIAGNOSIS — Z95.810 PRESENCE OF AUTOMATIC CARDIOVERTER/DEFIBRILLATOR (AICD): ICD-10-CM

## 2025-06-13 DIAGNOSIS — Z95.810 CARDIOMYOPATHY WITH IMPLANTABLE CARDIOVERTER-DEFIBRILLATOR: Primary | ICD-10-CM

## 2025-06-13 PROCEDURE — 93284 PRGRMG EVAL IMPLANTABLE DFB: CPT | Performed by: INTERNAL MEDICINE

## 2025-06-13 PROCEDURE — 93284 PRGRMG EVAL IMPLANTABLE DFB: CPT

## 2025-06-16 ENCOUNTER — APPOINTMENT (OUTPATIENT)
Dept: DERMATOLOGY | Facility: CLINIC | Age: 85
End: 2025-06-16
Payer: MEDICARE

## 2025-06-16 ENCOUNTER — OFFICE VISIT (OUTPATIENT)
Dept: DERMATOLOGY | Facility: CLINIC | Age: 85
End: 2025-06-16
Payer: MEDICARE

## 2025-06-16 DIAGNOSIS — L82.1 SEBORRHEIC KERATOSIS: ICD-10-CM

## 2025-06-16 DIAGNOSIS — L57.8 DIFFUSE PHOTODAMAGE OF SKIN: ICD-10-CM

## 2025-06-16 DIAGNOSIS — D22.5 MELANOCYTIC NEVUS OF TRUNK: ICD-10-CM

## 2025-06-16 DIAGNOSIS — L73.9 FOLLICULITIS: ICD-10-CM

## 2025-06-16 DIAGNOSIS — C44.92 SQUAMOUS CELL CARCINOMA OF SKIN: ICD-10-CM

## 2025-06-16 DIAGNOSIS — D48.5 NEOPLASM OF UNCERTAIN BEHAVIOR OF SKIN: Primary | ICD-10-CM

## 2025-06-16 DIAGNOSIS — L57.0 ACTINIC KERATOSIS: ICD-10-CM

## 2025-06-16 ASSESSMENT — ITCH NUMERIC RATING SCALE: HOW SEVERE IS YOUR ITCHING?: 0

## 2025-06-16 ASSESSMENT — DERMATOLOGY PATIENT ASSESSMENT
DO YOU USE A TANNING BED: NO
DO YOU USE SUNSCREEN: OCCASIONALLY
DO YOU HAVE ANY NEW OR CHANGING LESIONS: YES
WHERE ARE THESE NEW OR CHANGING LESIONS LOCATED: RIGHT HAND

## 2025-06-16 ASSESSMENT — DERMATOLOGY QUALITY OF LIFE (QOL) ASSESSMENT: ARE THERE EXCLUSIONS OR EXCEPTIONS FOR THE QUALITY OF LIFE ASSESSMENT: NO

## 2025-06-16 NOTE — Clinical Note
Scattered on the patient's face, neck, and bilateral upper extremities, there are multiple tan- to light brown-colored, hyperkeratotic, stuck-on appearing papules of varying size and shape

## 2025-06-16 NOTE — Clinical Note
Folliculitis - flare on neck.  The bacterial nature of this condition and treatment options were discussed with the patient today.  At this time, we recommend topical antibiotic therapy with Clindamycin 1% lotion, which the patient was instructed to apply twice daily to the affected areas or up to 3-4 times per day as needed for active lesions.  The risks, benefits, and side effects of this medication were discussed.  The patient expressed understanding and is in agreement with this plan.

## 2025-06-16 NOTE — Clinical Note
History of squamous cell carcinoma and actinic keratoses and photodamage.  The signs and symptoms of skin cancer were reviewed and the patient was advised to practice sun protection and sun avoidance, use daily sunscreen, and perform regular self skin exams.  We will have the patient return to our office in 4 to 6 months for routine follow-up and skin exam, and the patient was instructed to call our office should the patient notice any new, changing, symptomatic, or otherwise concerning skin lesions before then.  The patient expressed understanding and is in agreement with this plan.

## 2025-06-16 NOTE — Clinical Note
Scattered on the patient's face, neck, and bilateral upper extremities, there are several small, round- to oval-shaped, brown-pigmented and pink-colored, symmetric, uniform-appearing macules and dome-shaped papules

## 2025-06-16 NOTE — PROGRESS NOTES
"Shelley Comer \"Timothy\" is a 84 y.o. male who presents for the following: Suspicious Skin Lesion.  He notes 2 new lesions of concern on his right neck and right cheek.  He reports the lesions are red, scaly, and itchy, have increased in size, and do not heal.  He also notes recent pimple breakouts on his neck.  He denies any other new, changing, or concerning skin lesions since his last visit; no bleeding, itching, or burning lesions.      Review of Systems:  No other skin or systemic complaints other than what is documented elsewhere in the note.    The following portions of the chart were reviewed this encounter and updated as appropriate:       Skin Cancer History  Biopsy Log Book  Biopsied Type Location Status   4/4/25 SCC Right Distal Dorsal Hand Refer Mohs/Surgeon       Additional History      Specialty Problems    None      Past Dermatologic / Past Relevant Medical History:    - recent biopsy-proven atypical squamous proliferation, for an invasive SCC was favored, on right distal dorsal hand diagnosed at last visit on 4/4/25 and pending definitive treatment  - history of AKs  - history of eczema, currently on Dupixent prescribed by his outside Dermatologist, Dr. Nenita Alvarez, according to the patient  - folliculitis  - no history of psoriasis or melanoma    Family History:    No family history of eczema, psoriasis, or skin cancer    Social History:    The patient states he works as an  and was seen with his wife in the office in the past, who he reports was diagnosed with an arthropod assault and resolved after they hired an  and found bed bugs    Allergies:  Coenzyme q10    Current Medications / CAM's:  Current Medications[1]     Objective   Well appearing patient in no apparent distress; mood and affect are within normal limits.    A skin examination was performed including: Face, neck, and bilateral hands. All findings within normal limits unless otherwise noted " below.    Assessment/Plan   Skin Exam  1. NEOPLASM OF UNCERTAIN BEHAVIOR OF SKIN (3)  Right lateral upper cheek  On the right lateral upper cheek, there is an 8 mm pink, shiny, scaly and crusted papule.    Lesion biopsy  Type of biopsy: punch    Informed consent: discussed and consent obtained    Timeout: patient name, date of birth, surgical site, and procedure verified    Anesthesia: the lesion was anesthetized in a standard fashion    Anesthetic:  1% lidocaine w/ epinephrine 1-100,000 local infiltration  Punch size:  3 mm  Suture size:  5-0  Suture type: Prolene (polypropylene)    Suture removal (days):  7  Hemostasis achieved with: suture    Outcome: patient tolerated procedure well    Post-procedure details: sterile dressing applied and wound care instructions given    Dressing type: bandage and petrolatum    Specimen 1 - Dermatopathology- DERM LAB  Differential Diagnosis: scar vs AK vs SCCIS   Check Margins Yes/No?:    Comments:    Dermpath Lab: Routine Histopathology (formalin-fixed tissue)  Left Jawline  On the left jawline, there is a 1 cm erythematous, scaly, hyperkeratotic papule    Lesion biopsy  Type of biopsy: punch    Informed consent: discussed and consent obtained    Timeout: patient name, date of birth, surgical site, and procedure verified    Procedure prep:  Patient was prepped and draped  Anesthesia: the lesion was anesthetized in a standard fashion    Anesthetic:  1% lidocaine w/ epinephrine 1-100,000 local infiltration  Punch size:  3 mm  Suture size:  5-0  Suture type: Prolene (polypropylene)    Suture removal (days):  7  Hemostasis achieved with: suture    Outcome: patient tolerated procedure well    Post-procedure details: sterile dressing applied and wound care instructions given    Dressing type: bandage and petrolatum    Specimen 2 - Dermatopathology- DERM LAB  Differential Diagnosis: HAK vs SCCIS   Check Margins Yes/No?:    Comments:    Dermpath Lab: Routine Histopathology  (formalin-fixed tissue)  Right lateral posterior neck  On the right lateral posterior neck, there is an 8 mm erythematous, scaly papule    Shave removal    Lesion length (cm):  0.8  Margin per side (cm):  0  Lesion diameter (cm):  0.8  Informed consent: discussed and consent obtained    Timeout: patient name, date of birth, surgical site, and procedure verified    Procedure prep:  Patient was prepped and draped  Anesthesia: the lesion was anesthetized in a standard fashion    Anesthetic:  1% lidocaine w/ epinephrine 1-100,000 local infiltration  Instrument used: flexible razor blade    Hemostasis achieved with: aluminum chloride    Outcome: patient tolerated procedure well    Post-procedure details: sterile dressing applied and wound care instructions given    Dressing type: bandage and petrolatum    Specimen 3 - Dermatopathology- DERM LAB  Differential Diagnosis: folliculitis vs trauma vs SCC  Check Margins Yes/No?:    Comments:    Dermpath Lab: Routine Histopathology (formalin-fixed tissue)  2. ACTINIC KERATOSIS (18)  Head - Anterior (Face) (18)  Scattered on the patient's face, there are multiple erythematous, gritty, scaly macules   Actinic Keratoses - scattered on face.  The pre-cancerous nature of these lesions and treatment options were discussed with the patient today.  At this time, we recommend treatment with liquid nitrogen cryotherapy.  The patient expressed understanding, is in agreement with this plan, and wishes to proceed with cryotherapy today.  Destr of lesion - Head - Anterior (Face) (18)  Complexity: simple    Destruction method: cryotherapy    Informed consent: discussed and consent obtained    Lesion destroyed using liquid nitrogen: Yes    Cryotherapy cycles:  1  Outcome: patient tolerated procedure well with no complications    Post-procedure details: wound care instructions given    3. MELANOCYTIC NEVUS OF TRUNK  Generalized  Scattered on the patient's face, neck, and bilateral upper  extremities, there are several small, round- to oval-shaped, brown-pigmented and pink-colored, symmetric, uniform-appearing macules and dome-shaped papules  Clinically benign- to slightly atypical-appearing nevi - the clinically benign- to slightly atypical-appearing nature of the patient's nevi was discussed with the patient today.  None of the patient's nevi meet threshold for biopsy today.  We emphasized the importance of performing monthly self-skin exams using the ABCDs of monitoring moles, which were reviewed with the patient today and an informational hand-out provided.  We also emphasized the importance of sun avoidance and sun protection with daily sunscreen use.  The patient expressed understanding and is in agreement with this plan.  4. SEBORRHEIC KERATOSIS  Generalized  Scattered on the patient's face, neck, and bilateral upper extremities, there are multiple tan- to light brown-colored, hyperkeratotic, stuck-on appearing papules of varying size and shape  Seborrheic Keratoses - the benign nature of these lesions was discussed with the patient today and reassurance provided.  No treatment is medically indicated for these lesions at this time.  5. FOLLICULITIS  Neck - Anterior  Scattered on the patient's neck, there are several follicular-based erythematous, inflammatory papules and pustules  Folliculitis - flare on neck.  The bacterial nature of this condition and treatment options were discussed with the patient today.  At this time, we recommend topical antibiotic therapy with Clindamycin 1% lotion, which the patient was instructed to apply twice daily to the affected areas or up to 3-4 times per day as needed for active lesions.  The risks, benefits, and side effects of this medication were discussed.  The patient expressed understanding and is in agreement with this plan.  Related Medications  clindamycin (Cleocin T) 1 % lotion  Apply topically 2 times a day.  6. SQUAMOUS CELL CARCINOMA OF  SKIN  Right distal dorsal hand  Pink, well-healed scar at his recent biopsy site  Biopsy-proven atypical squamous proliferation, for which an invasive squamous cell carcinoma was favored - right distal dorsal hand.  The findings of an atypical squamous proliferation, for which an invasive SCC was favored, the malignant nature of SCC and the need for further definitive treatment of this lesion, and management options were discussed extensively with the patient in the office today.  At this time, we again recommend referral to our Dermatologic surgery unit for definitive treatment of this SCC, likely with Mohs surgery.  The patient expressed understanding, is in agreement with this plan, and states he had already scheduled his Mohs surgery to be performed in our office by Dr. Lazaro today 6/16/25, but he canceled his surgery.  Thus, a referral was re-submitted on his behalf.  He expressed understanding, is in agreement with this plan, and will anticipate a phone call from our surgery unit within the next 1 to 2 weeks to reschedule his surgery.  7. DIFFUSE PHOTODAMAGE OF SKIN  Generalized  Diffuse photodamage with actinic changes with telangiectasia and mottled pigmentation in sun-exposed areas.  History of squamous cell carcinoma and actinic keratoses and photodamage.  The signs and symptoms of skin cancer were reviewed and the patient was advised to practice sun protection and sun avoidance, use daily sunscreen, and perform regular self skin exams.  We will have the patient return to our office in 4 to 6 months for routine follow-up and skin exam, and the patient was instructed to call our office should the patient notice any new, changing, symptomatic, or otherwise concerning skin lesions before then.  The patient expressed understanding and is in agreement with this plan.          Deborah Orellana,         I saw and evaluated the patient. I personally obtained the key and critical portions of the history and physical  exam or was physically present for key and critical portions performed by the resident/fellow. I reviewed the resident/fellow's documentation and discussed the patient with the resident/fellow. I agree with the resident/fellow's medical decision making as documented in the note.    Santo Gottlieb MD         [1]   Current Outpatient Medications:     amiodarone (Pacerone) 200 mg tablet, TAKE 1 TABLET BY MOUTH DAILY, Disp: 90 tablet, Rfl: 1    atorvastatin (Lipitor) 40 mg tablet, Take 1 tablet (40 mg) by mouth once daily., Disp: 90 tablet, Rfl: 3    azelastine (Astelin) 137 mcg (0.1 %) nasal spray, USE 2 SPRAYS INTO EACH NOSTRIL TWICE A DAY, Disp: 30 mL, Rfl: 1    carvedilol (Coreg) 6.25 mg tablet, TAKE 1 TABLET (6.25 MG) BY MOUTH 2 TIMES DAILY (MORNING AND LATE AFTERNOON)., Disp: 180 tablet, Rfl: 3    cetirizine (ZyrTEC) 10 mg tablet, Take 1 tablet (10 mg) by mouth once daily., Disp: , Rfl:     clindamycin (Cleocin T) 1 % lotion, Apply topically 2 times a day., Disp: 60 mL, Rfl: 11    clopidogrel (Plavix) 75 mg tablet, TAKE 1 TABLET BY MOUTH ONCE DAILY., Disp: 90 tablet, Rfl: 3    crisaborole (Eucrisa) 2 % ointment, Apply 1 Application topically 2 times a day., Disp: , Rfl:     dupilumab (Dupixent) 300 mg/2 mL injection, Inject 2 mL (300 mg) under the skin 1 time., Disp: , Rfl:     Eliquis 5 mg tablet, TAKE 1 TABLET BY MOUTH EVERY 12 HOURS, Disp: 60 tablet, Rfl: 11    empagliflozin (Jardiance) 10 mg, Take 1 tablet (10 mg) by mouth once daily., Disp: 90 tablet, Rfl: 3    Entresto 49-51 mg tablet, TAKE 1 TABLET BY MOUTH TWICE A DAY, Disp: 60 tablet, Rfl: 11    ferrous sulfate, 325 mg ferrous sulfate, (iron) tablet, Take 1 tablet by mouth once daily with breakfast., Disp: , Rfl:     fexofenadine (Allegra) 180 mg tablet, TAKE 1 TABLET (180 MG) BY MOUTH ONCE DAILY AS NEEDED (ALLERGY SYMPTOMS)., Disp: 90 tablet, Rfl: 0    fish oil concentrate (Omega-3) 120-180 mg capsule, Take 1 capsule (1 g) by mouth once daily., Disp: ,  Rfl:     fluocinonide (Lidex) 0.05 % cream, Apply twice daily to affected areas of body (avoid face, groin, body folds) for 2 weeks, taper to weekends only for persistent areas; repeat every 6-8 weeks as needed for flares, Disp: 120 g, Rfl: 2    fluocinonide (Lidex) 0.05 % ointment, Apply twice daily to affected areas of body (avoid face, groin, body folds) for 2 weeks, taper to weekends only for persistent areas; repeat every 6-8 weeks as needed for flares, Disp: 60 g, Rfl: 1    furosemide (Lasix) 20 mg tablet, Take 1 tablet (20 mg) by mouth once daily as needed., Disp: , Rfl:     ipratropium (Atrovent) 21 mcg (0.03 %) nasal spray, ADMINISTER 2 SPRAYS INTO EACH NOSTRIL EVERY 12 HOURS., Disp: 90 mL, Rfl: 2    metroNIDAZOLE (Metrocream) 0.75 % cream, Apply twice daily to affected areas of face, Disp: 45 g, Rfl: 11    mv-min/FA/vit K/lutein/zeaxant (PRESERVISION AREDS 2 PLUS MV ORAL), Take by mouth., Disp: , Rfl:     nitroglycerin (Nitrostat) 0.4 mg SL tablet, Place 1 tablet (0.4 mg) under the tongue every 5 minutes if needed. UP TO 3 DOSES AS NEEDED FOR CHEST PAIN., Disp: , Rfl:     ondansetron (Zofran) 4 mg tablet, TAKE 1 TABLET BY MOUTH EVERY DAY AS NEEDED, Disp: 15 tablet, Rfl: 0    spironolactone (Aldactone) 25 mg tablet, TAKE 1 TABLET DAILY, Disp: 90 tablet, Rfl: 2    traZODone (Desyrel) 100 mg tablet, Take 1 tablet (100 mg) by mouth once daily at bedtime., Disp: 90 tablet, Rfl: 1    triamcinolone (Kenalog) 0.1 % cream, Apply 1 Application topically., Disp: , Rfl:     triamcinolone (Kenalog) 0.1 % cream, Apply twice daily to affected areas of body (avoid face, groin, body folds) for 2 weeks, then taper to twice daily on weekends only; repeat every 6-8 weeks as needed for flares, Disp: 454 g, Rfl: 1    vit C/E/Zn/coppr/lutein/zeaxan (PRESERVISION AREDS-2 ORAL), Take 2 capsules by mouth once daily., Disp: , Rfl:

## 2025-06-16 NOTE — Clinical Note
Scattered on the patient's neck, there are several follicular-based erythematous, inflammatory papules and pustules

## 2025-06-16 NOTE — Clinical Note
Biopsy-proven atypical squamous proliferation, for which an invasive squamous cell carcinoma was favored - right distal dorsal hand.  The findings of an atypical squamous proliferation, for which an invasive SCC was favored, the malignant nature of SCC and the need for further definitive treatment of this lesion, and management options were discussed extensively with the patient in the office today.  At this time, we again recommend referral to our Dermatologic surgery unit for definitive treatment of this SCC, likely with Mohs surgery.  The patient expressed understanding, is in agreement with this plan, and states he had already scheduled his Mohs surgery to be performed in our office by Dr. Lazaro today 6/16/25, but he canceled his surgery.  Thus, a referral was re-submitted on his behalf.  He expressed understanding, is in agreement with this plan, and will anticipate a phone call from our surgery unit within the next 1 to 2 weeks to reschedule his surgery.

## 2025-06-17 ENCOUNTER — APPOINTMENT (OUTPATIENT)
Dept: PHYSICAL THERAPY | Facility: CLINIC | Age: 85
End: 2025-06-17
Payer: MEDICARE

## 2025-06-20 DIAGNOSIS — L30.9 DERMATITIS: ICD-10-CM

## 2025-06-20 DIAGNOSIS — L21.9 SEBORRHEIC DERMATITIS: ICD-10-CM

## 2025-06-22 RX ORDER — KETOCONAZOLE 20 MG/ML
SHAMPOO, SUSPENSION TOPICAL
Qty: 120 ML | Refills: 11 | Status: SHIPPED | OUTPATIENT
Start: 2025-06-22

## 2025-06-22 RX ORDER — FLUOCINONIDE 0.5 MG/G
CREAM TOPICAL
Qty: 120 G | Refills: 0 | Status: SHIPPED | OUTPATIENT
Start: 2025-06-22

## 2025-06-23 ENCOUNTER — APPOINTMENT (OUTPATIENT)
Dept: PODIATRY | Facility: CLINIC | Age: 85
End: 2025-06-23
Payer: MEDICARE

## 2025-06-23 DIAGNOSIS — E11.9 ENCOUNTER FOR DIABETIC FOOT EXAM (MULTI): Primary | ICD-10-CM

## 2025-06-23 DIAGNOSIS — B35.1 ONYCHOMYCOSIS: ICD-10-CM

## 2025-06-23 DIAGNOSIS — I73.9 PVD (PERIPHERAL VASCULAR DISEASE): ICD-10-CM

## 2025-06-23 PROCEDURE — 1036F TOBACCO NON-USER: CPT | Performed by: PODIATRIST

## 2025-06-23 PROCEDURE — 1160F RVW MEDS BY RX/DR IN RCRD: CPT | Performed by: PODIATRIST

## 2025-06-23 PROCEDURE — 99213 OFFICE O/P EST LOW 20 MIN: CPT | Performed by: PODIATRIST

## 2025-06-23 PROCEDURE — 1159F MED LIST DOCD IN RCRD: CPT | Performed by: PODIATRIST

## 2025-06-23 NOTE — PROGRESS NOTES
History of Present Illness:   Patient states they are here for foot exam  States he needs help trimming nails, debrides nails at home.   No other pedal complaints    Past Medical History  Past Medical History:   Diagnosis Date    Aortic stenosis     valve replacement followed by TAVR    Atrial fibrillation (Multi)     amoidarone and DOAC    BPH (benign prostatic hyperplasia)     CAD (coronary artery disease)     SAMM to LAD    Congestive heart failure (CHF)     Stage C    Eczema     recently started dupixent    History of cardiac defibrillator placement     Hyperlipemia     Insulin resistance     Personal history of other diseases of the circulatory system 02/16/2022    History of aortic valve stenosis    Recurrent UTI     Right bundle branch block        Medications and Allergies have been reviewed.    Review Of Systems:  GENERAL: No weight loss, malaise or fevers.  HEENT: Negative for frequent or significant headaches,   RESPIRATORY: Negative for cough, wheezing or shortness of breath.  CARDIOVASCULAR: Negative for chest pain, leg swelling or palpitations.    Examination of Both Lower Extremities:   Objective:   Vasc: DP and PT pulses are palpable bilateral.  CFT is less than 3 seconds bilateral.  Skin temperature is warm to cool proximal to distal bilateral.  Varicosities noted. Mild edema noted b/l    Neuro: Vibratory, light touch and proprioception are intact bilateral.    Derm: Nails 1-5 bilateral are intact, non elongated, discolored and thick.   Skin is supple with normal texture and turgor noted.  Webspaces are clean, dry and intact bilateral.      Ortho: Muscle strength is 5/5 for all pedal groups tested.      1. Encounter for diabetic foot exam (Multi)        2. Onychomycosis        3. PVD (peripheral vascular disease) (CMS-[facility])          Patient exam and eval  Debrided nails, not long. Pt debrides at home  Ok to safely trim at home   Lower pedal risk  Fu yearly for DM exam  A1C reviewed 5.5 Oct  2024  Patient was in agreement to this plan. All questions answered.      Angela Sykes DPM  [phone]  Option 2  Fax: [phone]

## 2025-06-24 ENCOUNTER — TELEPHONE (OUTPATIENT)
Dept: DERMATOLOGY | Facility: CLINIC | Age: 85
End: 2025-06-24
Payer: MEDICARE

## 2025-06-24 NOTE — TELEPHONE ENCOUNTER
Pt left message that he has appt with Dr Gottlieb at [place] office on 06/30/25 at 2:00pm and would like for Dr Gottlieb to see his wife Monica at the same visit ., she has a lesion of concern ..please advise if ok ..pt will most likely come with his spouse to his visit ???

## 2025-06-27 DIAGNOSIS — I48.91 UNSPECIFIED ATRIAL FIBRILLATION (MULTI): ICD-10-CM

## 2025-06-27 RX ORDER — AMIODARONE HYDROCHLORIDE 200 MG/1
200 TABLET ORAL DAILY
Qty: 90 TABLET | Refills: 2 | Status: SHIPPED | OUTPATIENT
Start: 2025-06-27

## 2025-06-30 ENCOUNTER — APPOINTMENT (OUTPATIENT)
Dept: DERMATOLOGY | Facility: CLINIC | Age: 85
End: 2025-06-30
Payer: MEDICARE

## 2025-06-30 ENCOUNTER — APPOINTMENT (OUTPATIENT)
Dept: UROLOGY | Facility: CLINIC | Age: 85
End: 2025-06-30
Payer: MEDICARE

## 2025-06-30 ENCOUNTER — OFFICE VISIT (OUTPATIENT)
Dept: DERMATOLOGY | Facility: CLINIC | Age: 85
End: 2025-06-30
Payer: MEDICARE

## 2025-06-30 DIAGNOSIS — L57.0 ACTINIC KERATOSIS: Primary | ICD-10-CM

## 2025-06-30 DIAGNOSIS — D49.2 NEOPLASM OF SKIN: ICD-10-CM

## 2025-06-30 DIAGNOSIS — C44.92 SQUAMOUS CELL CARCINOMA OF SKIN: ICD-10-CM

## 2025-06-30 DIAGNOSIS — L30.9 DERMATITIS: ICD-10-CM

## 2025-06-30 DIAGNOSIS — L82.0 INFLAMED SEBORRHEIC KERATOSIS: ICD-10-CM

## 2025-06-30 DIAGNOSIS — L57.8 DIFFUSE PHOTODAMAGE OF SKIN: ICD-10-CM

## 2025-06-30 DIAGNOSIS — L82.1 SEBORRHEIC KERATOSIS: ICD-10-CM

## 2025-06-30 PROCEDURE — 17000 DESTRUCT PREMALG LESION: CPT | Performed by: DERMATOLOGY

## 2025-06-30 PROCEDURE — 99214 OFFICE O/P EST MOD 30 MIN: CPT | Performed by: DERMATOLOGY

## 2025-06-30 PROCEDURE — 17003 DESTRUCT PREMALG LES 2-14: CPT | Performed by: DERMATOLOGY

## 2025-06-30 PROCEDURE — 17110 DESTRUCTION B9 LES UP TO 14: CPT | Performed by: DERMATOLOGY

## 2025-06-30 RX ORDER — FLUOCINONIDE 0.05 MG/G
OINTMENT TOPICAL
Qty: 60 G | Refills: 1 | Status: SHIPPED | OUTPATIENT
Start: 2025-06-30

## 2025-06-30 NOTE — Clinical Note
History of squamous cell carcinoma and actinic keratoses and photodamage.  The signs and symptoms of skin cancer were reviewed and the patient was advised to practice sun protection and sun avoidance, use daily sunscreen, and perform regular self skin exams.  I will have the patient return to our office in 4 to 6 months for routine follow-up and skin exam, and the patient was instructed to call our office should the patient notice any new, changing, symptomatic, or otherwise concerning skin lesions before then.  The patient expressed understanding and is in agreement with this plan.

## 2025-06-30 NOTE — Clinical Note
On the patient's left jawline, there is a pink, well-healed scar at the recent biopsy site with a surrounding rim of erythema, grittiness, and scale; scattered on the patient's left cheek, there are 4 erythematous, gritty, scaly macules

## 2025-06-30 NOTE — Clinical Note
On the patient's bilateral forearms, there are several similar-appearing ill-defined erythematous and hyperpigmented, scaly, lichenified, slightly thickened plaques

## 2025-06-30 NOTE — Clinical Note
On the patient's left medial mid forearm, there are 2 similar-appearing 1 cm erythematous and light brown-colored, hyperkeratotic, stuck-on appearing papules each with a surrounding rim of erythema

## 2025-06-30 NOTE — Clinical Note
Eczema -flare on bilateral forearms.  The potentially chronic and intermittently flaring nature of this condition and treatment options were discussed extensively with the patient today.  At this time, I recommend topical steroid therapy with Fluocinonide 0.05% ointment, which the patient was instructed to apply twice daily to the affected areas of his forearms (avoid face, groin, body folds) for the next 2 weeks, followed by taper to twice daily on weekends only for persistent areas; the patient may repeat treatment in a 2-week burst-and-taper fashion every 6-8 weeks as needed for future flares.  I also emphasized the importance of dry, sensitive skin care, including the use of a mild soap, such as Dove, and frequent and aggressive moisturization, at least twice daily and immediately following showers or baths, with recommended over-the-counter moisturizing creams, such as Eucerin, Cetaphil, Cerave, or Aveeno, or Vaseline or Aquaphor ointments.  The risks, benefits, and side effects of this medication, including possible skin atrophy with overuse of topical steroids, were discussed.  The patient expressed understanding and is in agreement with this plan.

## 2025-06-30 NOTE — Clinical Note
Biopsy-proven hypertrophic actinic keratosis, present on the margins, and epidermal hyperplasia with degeneration - right lateral posterior neck.  The findings of a hypertrophic AK, present on the margins, and epidermal hyperplasia with degeneration in the biopsy of this lesion performed at his last visit on 6/16/25, which favored trauma related changes rather than invasive squamous cell carcinoma, the malignant nature of a potential SCC and the recommendation the patient undergo further biopsy or re-excision of this lesion if the lesion persists or recurs, and management options were discussed extensively with the patient and his wife in the office today.  After discussing the risks and benefits of various management options, the patient expressed understanding and wishes to be referred to our Dermatologic surgery unit for re-excision of this lesion rather than further biopsy at this time.  Thus, a referral was submitted on his behalf today.  He expressed understanding, is in agreement with this plan, and will anticipate a phone call from our surgery unit within the next 1 to 2 weeks to schedule his surgery.  Of note, he states his suture already fell out on its own prior to today's visit.

## 2025-06-30 NOTE — Clinical Note
Inflamed Seborrheic Keratoses -left medial mid forearm.  The benign nature of these lesions was discussed with the patient today and reassurance provided.  Given the history the patient provides of frequent irritation and associated symptoms as well as their inflamed appearance on exam today, I offered to treat these 2 lesions with liquid nitrogen cryotherapy.  The patient expressed understanding, is in agreement with this plan, and wishes to proceed with cryotherapy today.

## 2025-06-30 NOTE — Clinical Note
Biopsy-proven atypical squamous proliferation, for which an invasive squamous cell carcinoma was favored - right distal dorsal hand.  The findings of an atypical squamous proliferation, for which an invasive SCC was favored, the malignant nature of SCC and the need for further definitive treatment of this lesion, and management options were discussed extensively with the patient in the office again today.  At this time, I again recommend referral to our Dermatologic surgery unit for definitive treatment of this SCC, likely with Mohs surgery.  The patient expressed understanding, is in agreement with this plan, and states he had already scheduled his Mohs surgery to be performed in our office by Dr. Lazaro on 7/28/25.

## 2025-06-30 NOTE — Clinical Note
Biopsy-proven Actinic Keratosis and additional AKs -left jawline, present on the peripheral margins, and scattered on left cheek, respectively.  The pre-cancerous nature of these lesions and treatment options were discussed with the patient today.  At this time, I recommend treatment with liquid nitrogen cryotherapy.  The patient expressed understanding, is in agreement with this plan, and wishes to proceed with cryotherapy today.  Of note, he states his sutures fell out on their own prior to today's visit.

## 2025-07-01 ENCOUNTER — TELEPHONE (OUTPATIENT)
Dept: OTOLARYNGOLOGY | Facility: CLINIC | Age: 85
End: 2025-07-01
Payer: MEDICARE

## 2025-07-01 NOTE — TELEPHONE ENCOUNTER
Left message to schedule ear wax cleaning for him and his spouse per Dr. Richter, non urgent appointments.

## 2025-07-09 DIAGNOSIS — J34.89 OTHER SPECIFIED DISORDERS OF NOSE AND NASAL SINUSES: ICD-10-CM

## 2025-07-16 RX ORDER — AZELASTINE 1 MG/ML
2 SPRAY, METERED NASAL 2 TIMES DAILY
Qty: 30 ML | Refills: 1 | Status: SHIPPED | OUTPATIENT
Start: 2025-07-16 | End: 2026-07-16

## 2025-07-22 ENCOUNTER — EVALUATION (OUTPATIENT)
Dept: PHYSICAL THERAPY | Facility: CLINIC | Age: 85
End: 2025-07-22
Payer: MEDICARE

## 2025-07-22 DIAGNOSIS — R26.89 BALANCE DISORDER: Primary | ICD-10-CM

## 2025-07-22 PROCEDURE — 97161 PT EVAL LOW COMPLEX 20 MIN: CPT | Mod: GP

## 2025-07-22 PROCEDURE — 97110 THERAPEUTIC EXERCISES: CPT | Mod: GP

## 2025-07-22 ASSESSMENT — ENCOUNTER SYMPTOMS
OCCASIONAL FEELINGS OF UNSTEADINESS: 1
DEPRESSION: 0
LOSS OF SENSATION IN FEET: 1

## 2025-07-22 NOTE — PROGRESS NOTES
"Physical Therapy Evaluation and Treatment    Patient Name: Greg Hernandez"Omari"  MRN: [id number]  Today's Date: 7/22/2025  Time Calculation  Start Time: 1305  Stop Time: 1345  Time Calculation (min): 40 min  PT Evaluation Time Entry  PT Evaluation (Low) Time Entry: 20  PT Therapeutic Procedures Time Entry  Therapeutic Exercise Time Entry: 20        Insurance: [facility] Medicare, auth needed  Plan of Care: 7/22/25 to 10/20/25  Visit #1    Referring MD Tima Waller    Assessment     Greg Donald" is a 84 y.o. referred for difficulty walking and balance issues. Patient demonstrates decreased BLE strength (most significant at the ankles), decreased sensation in L lower leg, altered gait mechanics, decreased static balance, and decreased endurance. At this time, patient is limited with walking more than 5 minutes, negotiating steps and curbs, standing up out of chairs. Patient will benefit from physical therapy services to address stated impairments and improve functional mobility.    Plan  Treatment/Interventions: Cryotherapy, Education/ Instruction, Electrical stimulation, Gait training, Hot pack, Manual therapy, Neuromuscular re-education, Self care/ home management, Therapeutic activities, Therapeutic exercises  PT Plan: Skilled PT  PT Frequency: 1 time per week  Duration: 8 visits  Onset Date: 01/01/22  Certification Period Start Date: 07/22/25  Certification Period End Date: 10/20/25  Number of Treatments Authorized: auth required  Rehab Potential: Fair  Plan of Care Agreement: Patient    Primary diagnosis: Balance disorder  Current Problem  1. Balance disorder            General:  General  Reason for Referral: Balance  Referred By: tima waller  Past Medical History Relevant to Rehab: several bouts of PT for same problem, limited improve in ankle function over the least 3-4 years  Preferred Learning Style: verbal, visual, written  Precautions:  Precautions  STEADI Fall Risk Score (The score of 4 or more " indicates an increased risk of falling): 5  Precautions Comment: Pacemaker, Afib, aortic valve replacement, fall risk    Subjective:  Chief complaints: Balance, walking  Onset/Surgery Date: 1/1/2022  Mechanism of Injury: after heart procedure  Previous History: Has done several short stints of therapy for same issue  Personal Factors that may impact care: Pacemaker, Afib, aortic valve replacement, fall risk      Pain:  No pain complaints   Aggravators: walking more than 5 minutes   Numbness/tingling? Yes ankle    Function:   Work/Recreation: Retired, used to be an avid skiier   Prior Level: Rollator outside, cane when needed; amb when inside apartment furniture walking   Current limitations: walking, balance, stairs, curbs   Condition: Improving     Home Situation: Apartment; moving to [place] 8/23 (elevator in house)   Stairs: no, elevator   Lives with wife   No concerns about home set up    Any falls in the past year No     Injuries? N/a    Fear of falling? No    Goals for Therapy:    Improve walking and balance    Objective   Sensation: Decreased sensation L shin and ankle     Strength R/L  Hip:               Flexion: 4+ / 4              Abduction 4/4              Adduction 4-/4-     Knee:              Flexion 4+ /4+              Extension 4 /4     Ankle:              Dorsiflexion 4+ /4+              Plantarflexion 4- / 3+              Inversion:  4 / 4              Eversion: 3+ / 3+     Balance:              Feet apart: 5 seconds     Gait: Amb with RW into clinic; can also amb with SPC in R hand, decreased R step length     5XSTS:  19.84 seconds, min UE use       Treatment:  Access Code: [email or web address]  URL: [email or web address]  Date: 07/22/2025  Prepared by: Nina Fuchs    Exercises  - Seated Hip Adduction Isometrics with Ball  - 1 x daily - 7 x weekly - 2 sets - 10 reps - 5 second hold  - Seated Hip Abduction with Resistance  - 1 x daily - 7 x weekly - 2 sets - 10 reps - 5  second hold  - Seated March with Resistance  - 1 x daily - 7 x weekly - 2 sets - 10 reps - 5 second  hold  - Seated Toe Taps  - 1 x daily - 7 x weekly - 2 sets - 10 reps  - Seated Calf Raise with Weights on Thighs  - 1 x daily - 7 x weekly - 2 sets - 10 reps      Goals:  Active       PT Problem       Pt will increase hip and knee strength to 5/5 to facilitate better walking and stair ambulation.        Start:  07/22/25    Expected End:  10/20/25            Pt will improve STS time with min UE use to 15 seconds to facilitate better balance and getting up out of a chair.        Start:  07/22/25    Expected End:  10/20/25            Patient will be independent with home exercise program for home maintenance.        Start:  07/22/25    Expected End:  09/05/25            Pt will be able to tolerate 7 minutes on the nustep with mild SOB to indicate improving aerobic capacity and endurance.        Start:  07/22/25    Expected End:  09/05/25                      Insurance Authorization Information  Date of Evaluation: 7/22/25    Onset Date: No onset impairment date on file.    Referring Physician: Tima Matta     Surgery in the Last 3 months:  no    CPT Codes: ALLOWED CPT CODES: THEREX (83862), THERE-ACT (14479), NMR  (66314), MANUAL (50788), and GAIT (96096)    Diagnosis:   Problem List Items Addressed This Visit           ICD-10-CM    Balance disorder - Primary R26.89          Functional Outcome:  Other Measures  5x Sit to Stand: 19.84 (UE use)    OT / PT Evaluation complexity:  low    Which of the following best describes the primary reason of therapy: Improving, restoring, or adapting functional mobility or skills    Visits Requested: 10    Date Range: 90 days    Select all conditions that apply: Unknown     Nina Campbell, PT

## 2025-07-23 ENCOUNTER — APPOINTMENT (OUTPATIENT)
Dept: OTOLARYNGOLOGY | Facility: CLINIC | Age: 85
End: 2025-07-23
Payer: MEDICARE

## 2025-07-23 VITALS — WEIGHT: 179 LBS | BODY MASS INDEX: 23.62 KG/M2

## 2025-07-23 DIAGNOSIS — H61.23 BILATERAL IMPACTED CERUMEN: Primary | ICD-10-CM

## 2025-07-23 DIAGNOSIS — J34.2 DEVIATED SEPTUM: ICD-10-CM

## 2025-07-23 DIAGNOSIS — J31.0 CHRONIC RHINITIS: ICD-10-CM

## 2025-07-23 DIAGNOSIS — H69.90 DYSFUNCTION OF EUSTACHIAN TUBE, UNSPECIFIED LATERALITY: ICD-10-CM

## 2025-07-23 DIAGNOSIS — H73.891 RETRACTION OF TYMPANIC MEMBRANE OF RIGHT EAR: ICD-10-CM

## 2025-07-23 RX ORDER — TRIAMCINOLONE ACETONIDE 55 UG/1
2 SPRAY, METERED NASAL DAILY
Qty: 16.5 G | Refills: 3 | Status: SHIPPED | OUTPATIENT
Start: 2025-07-23 | End: 2026-07-23

## 2025-07-23 NOTE — PROGRESS NOTES
"Otolaryngology - Head and Neck Surgery Outpatient New Patient Visit Note        Assessment/Plan:   Problem List Items Addressed This Visit    None  Visit Diagnoses         Codes      Bilateral impacted cerumen    -  Primary H61.23      Dysfunction of Eustachian tube, unspecified laterality     H69.90    Relevant Medications    triamcinolone (Nasacort) 55 mcg nasal inhaler    Other Relevant Orders    Referral to Audiology      Retraction of tympanic membrane of right ear     H73.891            84yoM with cerumen impaction b/l, debrided.   Right sided ETD and TM retraction.    Will control for rhinitis and acquire audiogram to aid in eval.           Follow up:  -plan for follow up in clinic as needed, after completion of ordered studies, and in 1-2 months    All of the above findings, impressions, treatment planning and follow up plans were discussed with the patient who indicated understanding.  the patient was instructed to contact or return to clinic sooner if symptoms/signs persist or worsen despite the above management.      Salomon Grijalva MD  Otolaryngology - Head and Neck Surgery            History Of Present Illness  Greg Comer \"Timothy\" is a 84 y.o. male presenting for cerumen.       The patient reports a history of ear wax buildup requiring debridement, usually every 6-12mo.    The pt reports gradual return of ear canal fullness and plugged sensation.  Reports some muffled hearing.    Denies otalgia, otorrhea.    Denies recent significant history of otitis media or externa.    Denies prior significant history of otologic surgery or trauma.     Notes some bothersome chronic itching in nose and ears.    Notes mild baseline congestion.             Past Medical History  He has a past medical history of Aortic stenosis, Atrial fibrillation (Multi), BPH (benign prostatic hyperplasia), CAD (coronary artery disease), Congestive heart failure (CHF), Eczema, History of cardiac defibrillator placement, Hyperlipemia, " Insulin resistance, Personal history of other diseases of the circulatory system (02/16/2022), Recurrent UTI, and Right bundle branch block.    Surgical History  He has a past surgical history that includes CT angio head w and wo IV contrast (10/20/2021); Aortic valve replacement; Transurethral resection of prostate; Skin cancer excision; and Spinal cord stimulator implant.     Social History  He reports that he has never smoked. He has never been exposed to tobacco smoke. He has never used smokeless tobacco. He reports that he does not currently use alcohol after a past usage of about 7.0 standard drinks of alcohol per week. He reports that he does not currently use drugs.    Family History  Family History[1]     Allergies  Coenzyme q10    Review of Systems  ROS: Pertinent positives as noted in HPI.    - CONSTITUTIONAL: Does not report weight loss, fever or chills.    - HEENT:   Ear: Does not report  , vertigo,    , otalgia, otorrhea  Nose: Does not report  ,  , epistaxis, decreased smell  Throat: Does not report pain, dysphagia, odynophagia  Larynx: Does not report hoarseness,  difficulty breathing, pain with speaking (odynophonia)  Neck: Does not report new masses, pain, swelling  Face: Does not report sinus pain, pressure, swelling, numbness, weakness     - RESPIRATORY: Does not report SOB or cough.    - CV: Does not report palpitations or chest pain.     - GI: Does not report abdominal pain, nausea, vomiting or diarrhea.    - : Does not report dysuria or urinary frequency.    - MSK: Does not report myalgia or joint pain.    - SKIN: Does not report rash or pruritus.    - NEUROLOGICAL: Does not report headache or syncope.    - PSYCHIATRIC: Does not report recent changes in mood. Does not report anxiety or depression.         Physical Exam:     GENERAL:   Alert & Oriented to person, place and time; Normal affect and appearance. Well developed and well nourished. Conversant & cooperative with examination.      HEAD:   Normocephalic, atraumatic. No sinus tenderness to palpation. Normal parotid bilaterally. Normal facial strength.     NEUROLOGIC:   Cranial nerves II-XII grossly intact, gait WNL. Normal mood and affect.    EYES:   Extraocular movements intact. Pupils equal, round, reactive to light and accommodation. No nystagmus, no ptosis. no scleral injection.    EAR:   Normal auricle. No discomfort or TTP with manipulation.   Handheld otoscopic exam showed normal external auditory canals bilaterally. No purulence or EAC inflammation. Impacted cerumen b/l.  Debrided with suction.  H2O2 and extra time needed.    Right tympanic membrane clear and immobile with broad retraction without evidence of perforation,   or middle ear effusion.   Left tympanic membrane clear and mobile without evidence of perforation, retraction or middle ear effusion.     NOSE:   No external deformity. No external nasal lesions, lacerations, or scars. Nasal tip symmetrical with normal nasal valves.   Nasal cavity with S shaped deviated   septum, edematous  mucosa and turbinates. No lesions, masses, purulence or polyps.     OC/OP:   Mucous membranes moist, no masses, lesions or exudates.   Normal tongue, floor of mouth, teeth, gums, lips. Normal posterior pharyngeal wall.    Normal tonsils without erythema, exudate or obvious calculi     NECK:   No neck masses or thyroid enlargement. Trachea midline. No tenderness to palpation    LYMPHATIC:   No cervical lymphadenopathy.     RESPIRATORY:   Symmetric chest elevation & no retractions. No significant hoarseness. No increased work of breathing.    CV:   No clubbing or cyanosis. No obvious edema    Skin:   No facial rashes, vesicles or lesions.     Extremities:   No gross abnormalities      Clinic Procedure    Binocular microscopy exam  Indication: tympanic membrane(s) could not be visualized adequately with handheld otoscopy.   Location:  bilateral ears  Visualization Instrument: A microscope was  used to visualize through a speculum placed in the ear canal(s) to visualize the ear canal, tympanic membranes and to assist in assessment and removal of debris.  Findings:  see physical exam documentation  Patient Status: The patient tolerated the procedure well.   Complications: There were no complications.     Information review:  External sources (notes, imaging, lab results) listed below personally reviewed to aid in medical decision making process.  -  -  -         [1]   Family History  Problem Relation Name Age of Onset    Coronary artery disease Mother      Coronary artery disease Father      No Known Problems Sister      No Known Problems Sister      No Known Problems Brother          Retired OB/Gyn - still teaches at [employer]    No Known Problems Daughter      No Known Problems Daughter          lives in group home - working every day

## 2025-07-28 ENCOUNTER — APPOINTMENT (OUTPATIENT)
Dept: DERMATOLOGY | Facility: CLINIC | Age: 85
End: 2025-07-28
Payer: MEDICARE

## 2025-07-28 VITALS — HEART RATE: 90 BPM | DIASTOLIC BLOOD PRESSURE: 83 MMHG | SYSTOLIC BLOOD PRESSURE: 143 MMHG

## 2025-07-28 DIAGNOSIS — C44.622 SCC (SQUAMOUS CELL CARCINOMA), HAND, RIGHT: ICD-10-CM

## 2025-07-28 PROCEDURE — 17311 MOHS 1 STAGE H/N/HF/G: CPT | Performed by: STUDENT IN AN ORGANIZED HEALTH CARE EDUCATION/TRAINING PROGRAM

## 2025-07-28 PROCEDURE — 17312 MOHS ADDL STAGE: CPT | Performed by: STUDENT IN AN ORGANIZED HEALTH CARE EDUCATION/TRAINING PROGRAM

## 2025-07-28 RX ORDER — MUPIROCIN 20 MG/G
OINTMENT TOPICAL DAILY
Qty: 44 G | Refills: 1 | Status: SHIPPED | OUTPATIENT
Start: 2025-07-28 | End: 2025-08-11

## 2025-07-28 NOTE — PROGRESS NOTES
"Office Visit Note  Date: 7/28/2025  Surgeon:  Sang Lazaro MD  Office Location: DO [address] [place] [address] [address]  [facility] [address]  Dept: [phone]  Dept Fax: [phone]  Referring Provider: Santo Gottlieb MD  [address]   [facility] [address]  [place] [id number]    Shelley Comer \"Timothy\" is a 84 y.o. male who presents for the following: MOHS Surgery    According to the patient, the lesion has been present for approximately greater than 1 year at the time of diagnosis.  The lesion is not causing symptoms.  The lesion has not been treated previously.    The patient does have a pacemaker / defibrillator.  The patient does have a heart valve / joint replacement.  AORTIC VALVE 2022  The patient is on blood thinners.  The patient does not have a history of hepatitis B or C.  The patient does not have a history of HIV.  The patient does not have a history of immunosuppression (e.g. organ transplantation, malignancy, medications)    Review of Systems:  No other skin or systemic complaints other than what is documented elsewhere in the note.    MEDICAL HISTORY: clinically relevant history including significant past medical history, medications and allergies was reviewed and documented in Epic.    Objective   Well appearing patient in no apparent distress; mood and affect are within normal limits.  Vital signs: See record.  Noted on the   Right Distal Dorsal Hand  Is a 1.5 x 1.5 cm scar    The patient confirmed the identified site.    Discussion:  The nature of the diagnosis was explained. The lesion is a skin cancer.  It has a risk of local growth and distant spread. The condition is associated with sun exposure.  Warning signs of non-melanoma skin cancer discussed. Patient was instructed to perform monthly self skin examination.  We recommended that the patient have regular full skin exams given an increased risk of subsequent skin cancers. The patient was " instructed to use sun protective behaviors including use of broad spectrum sunscreens and sun protective clothing to reduce risk of skin cancers.      Risks, benefits, side effects of Mohs surgery were discussed with patient and the patient voiced understanding.  It was explained that even though the cure rate of Mohs is very high it is not 100%. Risks of surgery including but not limited to bleeding, infection, numbness, nerve damage, and scar were reviewed.  Discussion included wound care requirements, activity restrictions, likely scar outcome and time to heal.     After Mohs surgery, the defect may need to be repaired surgically and the scar may be longer than the original lesion.  Reconstruction options, risks, and benefits were reviewed including second intention healing, linear repair (4-1 ratio was explained), local flaps, skin grafts, cartilage grafts and interpolation flaps (the need for multiple surgeries was explained). Possible outcomes were reviewed including likely scar appearance, failure of flap survival, infection, bleeding and the need for revision surgery.     The pathology was reviewed, the photograph was reviewed, and the referring physician's note was reviewed.    Patient elected for Mohs surgery.     IUma RN am scribing for, and in the presence of Sang Lazaro MD    ISang MD, personally performed the services described in the documentation as scribed by  Uma Alston RN  in my presence, and confirm it is both accurate and complete.

## 2025-07-28 NOTE — PROGRESS NOTES
Mohs Surgery Operative Note    Date of Surgery:  7/28/2025  Surgeon:  Sang Lazaro MD  Office Location: [address]   [facility] [address]  [facility] [address]  Dept: [phone]  Dept Fax: [phone]  Referring Provider: Santo Gottlieb MD  [address] [place], [place] [id number]      Assessment/Plan   Pre-procedure:   Obtained informed consent: written from patient  The surgical site was identified and confirmed with the patient.     Intra-operative:   Audible time out called at : 1:38 PM 07/28/25  by: Uma Alston RN   Verified patient name, birthdate, site, specimen bottle label & requisition.    The planned procedure(s) was again reviewed with the patient. The risks of bleeding, infection, nerve damage and scarring were reviewed. Written authorization was obtained. The patient identity, surgical site, and planned procedure(s) were verified. The provider acted as both surgeon and pathologist.     SCC (SQUAMOUS CELL CARCINOMA), HAND, RIGHT  Right Distal Dorsal Hand  - Mohs surgery    Consent obtained: written    Universal Protocol:  Procedure explained and questions answered to patient or proxy's satisfaction: Yes    Test results available and properly labeled: Yes    Pathology report reviewed: Yes    External notes reviewed: Yes    Photo or diagram used for site identification: Yes    Site/side marked: Yes    Slide independently reviewed by Mohs surgeon: Yes    Immediately prior to procedure a time out was called: Yes    Patient identity confirmed: verbally with patient  Preparation: Patient was prepped and draped in usual sterile fashion      Anticoagulation:  Is the patient taking prescription anticoagulant and/or aspirin prescribed/recommended by a physician? Yes    Was the anticoagulation regimen changed prior to Mohs? No      Anesthesia:  Anesthesia method: local infiltration  Local anesthetic: lidocaine 1% WITH epi    Procedure Details:  Case  ID Number: [id number]  Biopsy accession number: [id number]  Date of biopsy: 4/4/2025  Pre-Op diagnosis: squamous cell carcinoma  Surgical site (from skin exam): Right Distal Dorsal Hand  Pre-operative length (cm): 1.5  Pre-operative width (cm): 1.5  Indications for Mohs surgery: anatomic location where tissue conservation is critical    Micrographic Surgery Details:  Post-operative length (cm): 2  Post-operative width (cm): 1.5  Number of Mohs stages: 2    Stage 1     Comments: The patient was brought into the operating room and placed in the procedure chair in the appropriate position.  The area positive by previous biopsy was identified and confirmed with the patient. The area of clinically obvious tumor was debulked using a curette and/or scalpel as needed. An incision was made following the Mohs approach through the skin. The specimen was taken to the lab, divided into 2 piece(s) and appropriately chromacoded and processed.    Tumor was seen on the lateral margins as indicated on the on the Mohs map.  Squamous cell carcinoma in situ. Histologic examination revealed enlarged, atypical keratinocytes with large nuclear to cytoplasmic ratio extending throughout the full thickness of an epidermis.        Tumor features identified on Mohs section: Squamous Cell Carcinoma in Situ      Depth of invasion: dermis    Stage 2     Comments: The area of positivity as noted on the Mohs map in the previous stage was identified and removed using the Mohs technique. The specimen was taken to the lab and appropriately chromacoded and processed in 1 piece(s).         Tumor features identified on Mohs section: no tumor identified    Depth of defect: subcutaneous fat    Patient tolerance of procedure: tolerated well, no immediate complications    Reconstruction:  Was the defect reconstructed?: No     Repair: After a discussion with the patient regarding the options for wound closure, a decision was made to proceed with second intention  healing.    Dressing/Follow-up: Surgifoam was placed in the wound. A pressure dressing was placed to help stabilize the wound and to minimize the risk of postoperative bleeding. Wound care was discussed, and the patient was given written post-operative wound care instructions.          - Staff Communication: Dermatology Local Anesthesia: Site Location: Right Distal Dorsal Hand 1 % Lidocaine / Epinephrine - Amount: 3cc  This Visit  - mupirocin (Bactroban) 2 % ointment - Apply topically once daily for 14 days. Apply to wound on right hand      The patient will follow up with Sang Lazaro MD as needed for any post operative problems or concerns, and will follow up with their primary dermatologist as scheduled.       IUma RN am scribing for, and in the presence of aSng Lazaro MD    ISang MD, personally performed the services described in the documentation as scribed by Uma Alston RN in my presence, and confirm it is both accurate and complete.

## 2025-08-04 DIAGNOSIS — N39.0 RECURRENT UTI: ICD-10-CM

## 2025-08-06 ENCOUNTER — TELEPHONE (OUTPATIENT)
Dept: UROLOGY | Facility: CLINIC | Age: 85
End: 2025-08-06
Payer: MEDICARE

## 2025-08-06 NOTE — TELEPHONE ENCOUNTER
Patient called in asking for the results of his urine test, I spoke with him and let him know that his urine culture is still showing preliminary and I will contact him once I get the final results. He understands.

## 2025-08-07 DIAGNOSIS — N39.0 RECURRENT UTI: ICD-10-CM

## 2025-08-07 LAB — BACTERIA UR CULT: ABNORMAL

## 2025-08-07 RX ORDER — NITROFURANTOIN 25; 75 MG/1; MG/1
100 CAPSULE ORAL 2 TIMES DAILY
Qty: 20 CAPSULE | Refills: 0 | Status: SHIPPED | OUTPATIENT
Start: 2025-08-07 | End: 2025-08-17

## 2025-08-11 ENCOUNTER — APPOINTMENT (OUTPATIENT)
Dept: DERMATOLOGY | Facility: CLINIC | Age: 85
End: 2025-08-11
Payer: MEDICARE

## 2025-08-11 DIAGNOSIS — S61.401A OPEN WOUND OF RIGHT HAND WITHOUT FOREIGN BODY, UNSPECIFIED WOUND TYPE, INITIAL ENCOUNTER: ICD-10-CM

## 2025-08-11 PROCEDURE — 99213 OFFICE O/P EST LOW 20 MIN: CPT | Performed by: STUDENT IN AN ORGANIZED HEALTH CARE EDUCATION/TRAINING PROGRAM

## 2025-08-15 ENCOUNTER — HOSPITAL ENCOUNTER (OUTPATIENT)
Dept: CARDIOLOGY | Facility: HOSPITAL | Age: 85
Discharge: HOME | End: 2025-08-15
Payer: MEDICARE

## 2025-08-15 DIAGNOSIS — I48.0 PAROXYSMAL ATRIAL FIBRILLATION (MULTI): ICD-10-CM

## 2025-08-15 DIAGNOSIS — Z95.810 PRESENCE OF AUTOMATIC CARDIOVERTER/DEFIBRILLATOR (AICD): ICD-10-CM

## 2025-08-15 DIAGNOSIS — I42.9 CARDIOMYOPATHY WITH IMPLANTABLE CARDIOVERTER-DEFIBRILLATOR: ICD-10-CM

## 2025-08-15 DIAGNOSIS — Z95.810 CARDIOMYOPATHY WITH IMPLANTABLE CARDIOVERTER-DEFIBRILLATOR: ICD-10-CM

## 2025-08-15 PROCEDURE — 93281 PM DEVICE PROGR EVAL MULTI: CPT

## 2025-08-18 DIAGNOSIS — R21 RASH AND OTHER NONSPECIFIC SKIN ERUPTION: ICD-10-CM

## 2025-08-18 DIAGNOSIS — L30.9 DERMATITIS: ICD-10-CM

## 2025-08-20 RX ORDER — FLUOCINONIDE 0.05 MG/G
OINTMENT TOPICAL
Qty: 60 G | Refills: 1 | OUTPATIENT
Start: 2025-08-20

## 2025-08-20 RX ORDER — FLUOCINONIDE 1 MG/G
CREAM TOPICAL
Qty: 120 G | Refills: 1 | OUTPATIENT
Start: 2025-08-20

## 2025-08-21 ENCOUNTER — DOCUMENTATION (OUTPATIENT)
Dept: PHYSICAL THERAPY | Facility: CLINIC | Age: 85
End: 2025-08-21
Payer: MEDICARE

## 2025-08-22 ENCOUNTER — APPOINTMENT (OUTPATIENT)
Dept: CARDIOLOGY | Facility: HOSPITAL | Age: 85
End: 2025-08-22
Payer: MEDICARE

## 2025-08-25 ENCOUNTER — APPOINTMENT (OUTPATIENT)
Dept: DERMATOLOGY | Facility: CLINIC | Age: 85
End: 2025-08-25
Payer: MEDICARE

## 2025-08-25 VITALS — SYSTOLIC BLOOD PRESSURE: 126 MMHG | HEART RATE: 100 BPM | DIASTOLIC BLOOD PRESSURE: 78 MMHG

## 2025-08-25 DIAGNOSIS — C44.42 SQUAMOUS CELL CARCINOMA OF SKIN OF SCALP AND NECK: ICD-10-CM

## 2025-08-25 PROCEDURE — 13132 CMPLX RPR F/C/C/M/N/AX/G/H/F: CPT | Performed by: STUDENT IN AN ORGANIZED HEALTH CARE EDUCATION/TRAINING PROGRAM

## 2025-08-25 PROCEDURE — 17311 MOHS 1 STAGE H/N/HF/G: CPT | Performed by: STUDENT IN AN ORGANIZED HEALTH CARE EDUCATION/TRAINING PROGRAM

## 2025-09-02 ENCOUNTER — APPOINTMENT (OUTPATIENT)
Dept: UROLOGY | Facility: CLINIC | Age: 85
End: 2025-09-02
Payer: MEDICARE

## 2025-09-03 ENCOUNTER — DOCUMENTATION (OUTPATIENT)
Dept: PHYSICAL THERAPY | Facility: CLINIC | Age: 85
End: 2025-09-03
Payer: MEDICARE

## 2025-09-10 ENCOUNTER — APPOINTMENT (OUTPATIENT)
Dept: INFECTIOUS DISEASES | Facility: CLINIC | Age: 85
End: 2025-09-10
Payer: MEDICARE

## 2025-09-17 ENCOUNTER — APPOINTMENT (OUTPATIENT)
Dept: OTOLARYNGOLOGY | Facility: CLINIC | Age: 85
End: 2025-09-17
Payer: MEDICARE

## 2025-09-24 ENCOUNTER — APPOINTMENT (OUTPATIENT)
Dept: PRIMARY CARE | Facility: CLINIC | Age: 85
End: 2025-09-24
Payer: MEDICARE

## 2025-09-25 ENCOUNTER — APPOINTMENT (OUTPATIENT)
Dept: DERMATOLOGY | Facility: CLINIC | Age: 85
End: 2025-09-25
Payer: MEDICARE

## 2025-09-29 ENCOUNTER — APPOINTMENT (OUTPATIENT)
Dept: DERMATOLOGY | Facility: CLINIC | Age: 85
End: 2025-09-29
Payer: MEDICARE

## 2025-10-02 ENCOUNTER — APPOINTMENT (OUTPATIENT)
Dept: OPHTHALMOLOGY | Facility: CLINIC | Age: 85
End: 2025-10-02
Payer: MEDICARE

## 2025-10-06 ENCOUNTER — APPOINTMENT (OUTPATIENT)
Dept: CARDIOLOGY | Facility: HOSPITAL | Age: 85
End: 2025-10-06
Payer: MEDICARE

## 2025-10-09 ENCOUNTER — APPOINTMENT (OUTPATIENT)
Dept: CARDIOLOGY | Facility: HOSPITAL | Age: 85
End: 2025-10-09
Payer: MEDICARE

## 2025-10-20 ENCOUNTER — APPOINTMENT (OUTPATIENT)
Dept: UROLOGY | Facility: CLINIC | Age: 85
End: 2025-10-20
Payer: MEDICARE

## 2025-11-10 ENCOUNTER — APPOINTMENT (OUTPATIENT)
Dept: PODIATRY | Facility: CLINIC | Age: 85
End: 2025-11-10
Payer: MEDICARE

## 2025-12-03 ENCOUNTER — APPOINTMENT (OUTPATIENT)
Dept: PRIMARY CARE | Facility: CLINIC | Age: 85
End: 2025-12-03
Payer: MEDICARE

## 2026-03-31 ENCOUNTER — APPOINTMENT (OUTPATIENT)
Dept: UROLOGY | Facility: CLINIC | Age: 86
End: 2026-03-31
Payer: MEDICARE